# Patient Record
Sex: FEMALE | Race: WHITE | NOT HISPANIC OR LATINO | Employment: FULL TIME | URBAN - METROPOLITAN AREA
[De-identification: names, ages, dates, MRNs, and addresses within clinical notes are randomized per-mention and may not be internally consistent; named-entity substitution may affect disease eponyms.]

---

## 2017-01-09 ENCOUNTER — RADIANT APPOINTMENT (OUTPATIENT)
Dept: MAMMOGRAPHY | Facility: CLINIC | Age: 47
End: 2017-01-09

## 2017-01-09 DIAGNOSIS — Z12.31 VISIT FOR SCREENING MAMMOGRAM: ICD-10-CM

## 2018-01-24 ENCOUNTER — RADIANT APPOINTMENT (OUTPATIENT)
Dept: MAMMOGRAPHY | Facility: CLINIC | Age: 48
End: 2018-01-24
Attending: FAMILY MEDICINE
Payer: COMMERCIAL

## 2018-01-24 DIAGNOSIS — Z12.31 VISIT FOR SCREENING MAMMOGRAM: ICD-10-CM

## 2019-02-06 ENCOUNTER — ANCILLARY PROCEDURE (OUTPATIENT)
Dept: MAMMOGRAPHY | Facility: CLINIC | Age: 49
End: 2019-02-06
Payer: COMMERCIAL

## 2019-02-06 DIAGNOSIS — Z12.31 VISIT FOR SCREENING MAMMOGRAM: ICD-10-CM

## 2019-03-09 ENCOUNTER — HEALTH MAINTENANCE LETTER (OUTPATIENT)
Age: 49
End: 2019-03-09

## 2019-10-03 ENCOUNTER — HEALTH MAINTENANCE LETTER (OUTPATIENT)
Age: 49
End: 2019-10-03

## 2020-02-20 ENCOUNTER — ANCILLARY PROCEDURE (OUTPATIENT)
Dept: MAMMOGRAPHY | Facility: CLINIC | Age: 50
End: 2020-02-20
Attending: FAMILY MEDICINE
Payer: COMMERCIAL

## 2020-02-20 DIAGNOSIS — Z12.31 VISIT FOR SCREENING MAMMOGRAM: ICD-10-CM

## 2020-03-22 ENCOUNTER — HEALTH MAINTENANCE LETTER (OUTPATIENT)
Age: 50
End: 2020-03-22

## 2021-01-15 ENCOUNTER — HEALTH MAINTENANCE LETTER (OUTPATIENT)
Age: 51
End: 2021-01-15

## 2021-03-09 ENCOUNTER — ANCILLARY PROCEDURE (OUTPATIENT)
Dept: MAMMOGRAPHY | Facility: CLINIC | Age: 51
End: 2021-03-09
Payer: COMMERCIAL

## 2021-03-09 DIAGNOSIS — Z12.31 VISIT FOR SCREENING MAMMOGRAM: ICD-10-CM

## 2021-03-09 PROCEDURE — 77067 SCR MAMMO BI INCL CAD: CPT | Mod: GC | Performed by: STUDENT IN AN ORGANIZED HEALTH CARE EDUCATION/TRAINING PROGRAM

## 2021-03-09 PROCEDURE — 77063 BREAST TOMOSYNTHESIS BI: CPT | Mod: GC | Performed by: STUDENT IN AN ORGANIZED HEALTH CARE EDUCATION/TRAINING PROGRAM

## 2021-09-05 ENCOUNTER — HEALTH MAINTENANCE LETTER (OUTPATIENT)
Age: 51
End: 2021-09-05

## 2022-02-20 ENCOUNTER — HEALTH MAINTENANCE LETTER (OUTPATIENT)
Age: 52
End: 2022-02-20

## 2022-04-12 ENCOUNTER — ANCILLARY PROCEDURE (OUTPATIENT)
Dept: MAMMOGRAPHY | Facility: CLINIC | Age: 52
End: 2022-04-12
Attending: FAMILY MEDICINE
Payer: COMMERCIAL

## 2022-04-12 DIAGNOSIS — Z12.31 VISIT FOR SCREENING MAMMOGRAM: ICD-10-CM

## 2022-04-12 PROCEDURE — 77063 BREAST TOMOSYNTHESIS BI: CPT | Performed by: STUDENT IN AN ORGANIZED HEALTH CARE EDUCATION/TRAINING PROGRAM

## 2022-04-12 PROCEDURE — 77067 SCR MAMMO BI INCL CAD: CPT | Performed by: STUDENT IN AN ORGANIZED HEALTH CARE EDUCATION/TRAINING PROGRAM

## 2022-10-23 ENCOUNTER — HEALTH MAINTENANCE LETTER (OUTPATIENT)
Age: 52
End: 2022-10-23

## 2023-04-19 ENCOUNTER — ANCILLARY PROCEDURE (OUTPATIENT)
Dept: MAMMOGRAPHY | Facility: CLINIC | Age: 53
End: 2023-04-19
Attending: FAMILY MEDICINE
Payer: COMMERCIAL

## 2023-04-19 DIAGNOSIS — Z12.31 VISIT FOR SCREENING MAMMOGRAM: ICD-10-CM

## 2023-04-19 PROCEDURE — 77063 BREAST TOMOSYNTHESIS BI: CPT | Mod: GC | Performed by: RADIOLOGY

## 2023-04-19 PROCEDURE — 77067 SCR MAMMO BI INCL CAD: CPT | Mod: GC | Performed by: RADIOLOGY

## 2023-04-24 ENCOUNTER — TRANSFERRED RECORDS (OUTPATIENT)
Dept: HEALTH INFORMATION MANAGEMENT | Facility: CLINIC | Age: 53
End: 2023-04-24
Payer: COMMERCIAL

## 2023-04-26 ENCOUNTER — ANCILLARY PROCEDURE (OUTPATIENT)
Dept: MAMMOGRAPHY | Facility: CLINIC | Age: 53
End: 2023-04-26
Attending: FAMILY MEDICINE
Payer: COMMERCIAL

## 2023-04-26 DIAGNOSIS — R92.8 ABNORMAL MAMMOGRAM OF LEFT BREAST: ICD-10-CM

## 2023-04-26 PROCEDURE — 77065 DX MAMMO INCL CAD UNI: CPT | Mod: LT | Performed by: RADIOLOGY

## 2023-05-01 ENCOUNTER — TRANSFERRED RECORDS (OUTPATIENT)
Dept: HEALTH INFORMATION MANAGEMENT | Facility: CLINIC | Age: 53
End: 2023-05-01

## 2023-05-01 ENCOUNTER — ANCILLARY PROCEDURE (OUTPATIENT)
Dept: MAMMOGRAPHY | Facility: CLINIC | Age: 53
End: 2023-05-01
Attending: FAMILY MEDICINE
Payer: COMMERCIAL

## 2023-05-01 DIAGNOSIS — R92.8 ABNORMAL MAMMOGRAM OF LEFT BREAST: ICD-10-CM

## 2023-05-01 PROCEDURE — 88360 TUMOR IMMUNOHISTOCHEM/MANUAL: CPT | Mod: 26 | Performed by: PATHOLOGY

## 2023-05-01 PROCEDURE — 88341 IMHCHEM/IMCYTCHM EA ADD ANTB: CPT | Mod: 26 | Performed by: PATHOLOGY

## 2023-05-01 PROCEDURE — 88342 IMHCHEM/IMCYTCHM 1ST ANTB: CPT | Mod: 26 | Performed by: PATHOLOGY

## 2023-05-01 PROCEDURE — 19081 BX BREAST 1ST LESION STRTCTC: CPT | Mod: LT | Performed by: STUDENT IN AN ORGANIZED HEALTH CARE EDUCATION/TRAINING PROGRAM

## 2023-05-01 PROCEDURE — 88305 TISSUE EXAM BY PATHOLOGIST: CPT | Mod: 26 | Performed by: PATHOLOGY

## 2023-05-01 PROCEDURE — 88305 TISSUE EXAM BY PATHOLOGIST: CPT | Mod: TC | Performed by: FAMILY MEDICINE

## 2023-05-01 RX ORDER — LIDOCAINE HYDROCHLORIDE AND EPINEPHRINE 10; 10 MG/ML; UG/ML
10 INJECTION, SOLUTION INFILTRATION; PERINEURAL ONCE
Status: COMPLETED | OUTPATIENT
Start: 2023-05-01 | End: 2023-05-01

## 2023-05-01 RX ADMIN — LIDOCAINE HYDROCHLORIDE AND EPINEPHRINE 10 ML: 10; 10 INJECTION, SOLUTION INFILTRATION; PERINEURAL at 14:20

## 2023-05-03 ENCOUNTER — TELEPHONE (OUTPATIENT)
Dept: MAMMOGRAPHY | Facility: CLINIC | Age: 53
End: 2023-05-03
Payer: COMMERCIAL

## 2023-05-03 ENCOUNTER — PATIENT OUTREACH (OUTPATIENT)
Dept: ONCOLOGY | Facility: CLINIC | Age: 53
End: 2023-05-03
Payer: COMMERCIAL

## 2023-05-03 DIAGNOSIS — D05.12 DUCTAL CARCINOMA IN SITU (DCIS) OF LEFT BREAST: Primary | ICD-10-CM

## 2023-05-03 LAB
PATH REPORT.COMMENTS IMP SPEC: ABNORMAL
PATH REPORT.COMMENTS IMP SPEC: ABNORMAL
PATH REPORT.COMMENTS IMP SPEC: YES
PATH REPORT.FINAL DX SPEC: ABNORMAL
PATH REPORT.GROSS SPEC: ABNORMAL
PATH REPORT.MICROSCOPIC SPEC OTHER STN: ABNORMAL
PATH REPORT.MICROSCOPIC SPEC OTHER STN: ABNORMAL
PATH REPORT.RELEVANT HX SPEC: ABNORMAL
PATHOLOGY SYNOPTIC REPORT: ABNORMAL
PHOTO IMAGE: ABNORMAL

## 2023-05-03 NOTE — TELEPHONE ENCOUNTER
Left a message for Luna regarding availability of her breast biopsy results.  Awaiting return phone call.  Call back number left was [phone].

## 2023-05-03 NOTE — TELEPHONE ENCOUNTER
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  We discussed the Radiologist's recommendation of surgical consultation.  A referral has been placed and someone will be reaching out to help coordinate the appointment.  Luna verbalized understanding and all questions and concerns were answered at this time.

## 2023-05-03 NOTE — PROGRESS NOTES
New Patient Oncology Nurse Navigator Note     Referred to (specialty:) Medical Oncology and Cancer Surgery      Date Referral Received: May 3, 2023     Evaluation for:  D05.12 (ICD-10-CM) - Ductal carcinoma in situ (DCIS) of left breast     Clinical History (per Nurse review of records provided):      Luna had bilateral screening mammogram on 4/19/23 and possible calcifications were identified in the left breast at the 1 o'clock position, posterior depth. Diagnostic imaging followed on 4/26 and spot magnification views demonstrates punctate and fine pleomorphic calcifications in the left breast 1:00 position, middle depth, measuring up to 5 mm.    5/1/23 -   LEFT BREAST, 1:00, MID-DEPTH, CALCIFICATIONS, STEREOTACTIC-GUIDED CORE BIOPSY:  -DUCTAL CARCINOMA IN SITU (DCIS), nuclear grade 3, solid and cribriform types with comedonecrosis and calcifications.  -Calcifications associated with DCIS and benign epithelium.  -DCIS is ER positive (strong, >95%) and AK positive (moderate, 70%).  -No evidence of invasion.     Records Location: See Bookmarked material     Records Needed: Breast imaging for past 5 years     5/4 8:08am  - Telephoned and spoke with Luna to assist in scheduling consults. Writer received referral, reviewed for appropriate plan, and call transferred to New Patient Scheduling for completion.

## 2023-05-04 ENCOUNTER — OFFICE VISIT (OUTPATIENT)
Dept: ONCOLOGY | Facility: CLINIC | Age: 53
End: 2023-05-04
Attending: SURGERY
Payer: COMMERCIAL

## 2023-05-04 ENCOUNTER — PRE VISIT (OUTPATIENT)
Dept: ONCOLOGY | Facility: CLINIC | Age: 53
End: 2023-05-04
Payer: COMMERCIAL

## 2023-05-04 ENCOUNTER — PATIENT OUTREACH (OUTPATIENT)
Dept: ONCOLOGY | Facility: CLINIC | Age: 53
End: 2023-05-04

## 2023-05-04 VITALS
HEART RATE: 84 BPM | BODY MASS INDEX: 24.77 KG/M2 | DIASTOLIC BLOOD PRESSURE: 88 MMHG | TEMPERATURE: 98.9 F | RESPIRATION RATE: 16 BRPM | SYSTOLIC BLOOD PRESSURE: 131 MMHG | HEIGHT: 66 IN | OXYGEN SATURATION: 99 % | WEIGHT: 154.1 LBS

## 2023-05-04 DIAGNOSIS — D05.12 DUCTAL CARCINOMA IN SITU (DCIS) OF LEFT BREAST: Primary | ICD-10-CM

## 2023-05-04 DIAGNOSIS — D05.12 DUCTAL CARCINOMA IN SITU (DCIS) OF LEFT BREAST: ICD-10-CM

## 2023-05-04 LAB
INTERPRETATION: NORMAL
INTERPRETATION: NORMAL
LAB PDF RESULT: NORMAL
LAB PDF RESULT: NORMAL
SIGNIFICANT RESULTS: NORMAL
SIGNIFICANT RESULTS: NORMAL
SPECIMEN DESCRIPTION: NORMAL
SPECIMEN DESCRIPTION: NORMAL
TEST DETAILS, MDL: NORMAL
TEST DETAILS, MDL: NORMAL

## 2023-05-04 PROCEDURE — 36415 COLL VENOUS BLD VENIPUNCTURE: CPT | Performed by: SURGERY

## 2023-05-04 PROCEDURE — 99417 PROLNG OP E/M EACH 15 MIN: CPT | Performed by: SURGERY

## 2023-05-04 PROCEDURE — 99205 OFFICE O/P NEW HI 60 MIN: CPT | Performed by: SURGERY

## 2023-05-04 PROCEDURE — 99212 OFFICE O/P EST SF 10 MIN: CPT | Performed by: SURGERY

## 2023-05-04 ASSESSMENT — PAIN SCALES - GENERAL: PAINLEVEL: NO PAIN (0)

## 2023-05-04 NOTE — LETTER
5/4/2023         RE: Luna Eugene  [address]  [place] [address]        Dear Colleague,    Thank you for referring your patient, Luna Eugene, to the [facility]. Please see a copy of my visit note below.    NEW SURGICAL CONSULTATION  May 4, 2023    Luna Eugene is a 52 year old woman who presents with a left  breast complaint.  She was referred by Ale Smith DO.    HPI:    She notes no masses in either breast, axilla, or neck. She denies any nipple discharge or nipple inversion.     Imaging showed 5 mm of microcalcifications at 1:00 middle depth.    A biopsy was performed and a clip was placed.  It showed ductal ca      BREAST-SPECIFIC HISTORY:  Prior breast surgeries: Yes Right axillary node excision 1997  Prior radiation history: No  Bra size: 34 B  Dominant hand: Right    FAMILY HISTORY:  Breast ca: No  Ovarian ca: No  Pancreatic ca: No  Melanoma: No  Gastric ca: No  Colon ca: No  Other cancer: Yes - mother w/ CML (dx 67)    Patient Active Problem List   Diagnosis    Irritable bowel syndrome    Fibromyalgia    Classical migraine    Panic disorder without agoraphobia    Insomnia    Allergic Rhinitis    CARDIOVASCULAR SCREENING; LDL GOAL LESS THAN 160    Dyspepsia    Presence of intrauterine contraceptive device    Herpes simplex labialis    No HTN, MI, CVA, DM    Past Medical History:   Diagnosis Date    Allergic rhinitis     Classic migraines     Fibromyalgia     GERD (gastroesophageal reflux disease)     Insomnia     Irritable bowel syndrome     Kidney stones     Panic disorder without agoraphobia     Presence of intrauterine contraceptive device 11/1/07    Mirena.  Replaced 11/1/12.       Past Surgical History:   Procedure Laterality Date    COLONOSCOPY  8/16/06    WNL     ESOPHAGOSCOPY, DIAGNOSTIC  5/02    decreased tone at GE junction (Dr. Billings)    [facility] NONSPECIFIC PROCEDURE      [place] teeth removed    [facility] NONSPECIFIC PROCEDURE  1997    Lymph  "node removal right axilla    [facility] NONSPECIFIC PROCEDURE  1991    Colposcopy    [facility] UGI ENDOSCOPY, SIMPLE EXAM  4/28/06    WNL   No GA issues    Current Outpatient Medications   Medication Sig Dispense Refill    cetirizine (ZYRTEC) 10 MG tablet Take 1 tablet by mouth daily as needed for allergies.      Cholecalciferol (VITAMIN D) 400 UNITS capsule Take 1 capsule by mouth daily.      isometheptene-acetaminophen-dichloralphenazone (MIDRIN) -100 MG per capsule 2 capsules at headache onset followed by 1 capsule every hour as needed up to 5 capsules/day 30 capsule 0    MULTI-VITAMIN OR TABS Take 1 tablet by mouth daily  0    lorazepam (ATIVAN) 0.5 MG tablet Take 1 tablet by mouth 2 times daily as needed for anxiety. 60 tablet 0    valACYclovir (VALTREX) 1000 mg tablet Take 2 tablets (2,000 mg) by mouth 2 times daily 4 tablet 11           Allergies   Allergen Reactions    No Known Allergies         SOCIAL HISTORY:  Smokes: No    She works as an  - grade 3    ROS:  Easy bruising/bleeding: No  History of DVT/PE: No personal history; MGF did have VTE.    /88 (BP Location: Left arm, Patient Position: Sitting, Cuff Size: Adult Regular)   Pulse 84   Temp 98.9  F (37.2  C) (Oral)   Resp 16   Ht 1.683 m (5' 6.25\")   Wt 69.9 kg (154 lb 1.6 oz)   SpO2 99%   BMI 24.69 kg/m     Physical Exam  Constitutional:       Appearance: She is well-developed.   Chest:   Breasts:     Breasts are symmetrical.      Right: No inverted nipple, mass, nipple discharge, skin change or tenderness.      Left: No inverted nipple, mass, nipple discharge, skin change or tenderness.          Comments: Patient was examined in both supine and upright positions.   Lymphadenopathy:      Cervical: No cervical adenopathy.      Right cervical: No superficial, deep or posterior cervical adenopathy.     Left cervical: No superficial, deep or posterior cervical adenopathy.      Upper Body:      Right upper body: No " supraclavicular, axillary or pectoral adenopathy.      Left upper body: No supraclavicular, axillary or pectoral adenopathy.      Comments: No lymphedema in bilateral upper extremities.   Skin:     General: Skin is warm and dry.          INVESTIGATIONS:    Diagnostic Mammogram (4/26/2023) showed:  Technique:  Tomosynthesis  Findings: Spot magnification views demonstrates punctate and fine pleomorphic calcifications in the left breast 1:00 position, middle depth, measuring up to 5 mm.  IMPRESSION: BI-RADS CATEGORY: 4 - Suspicious.    Screening Mammogram (4/19/2023) showed:  Findings: The breasts are heterogeneously dense, which may obscure small masses.  There are possible calcifications in the left breast at the 1 o'clock position, posterior depth.  The remainder of the breast tissue is unremarkable.  There is no suspicious finding of the right breast.  IMPRESSION: BI-RADS CATEGORY: 0 - Incomplete - Need Additional Imaging    Biopsy (5/1/2023) showed:  Final Diagnosis   LEFT BREAST, 1:00, MID-DEPTH, CALCIFICATIONS, STEREOTACTIC-GUIDED CORE BIOPSY:  -DUCTAL CARCINOMA IN SITU (DCIS), nuclear grade 3, solid and cribriform types with comedonecrosis and calcifications.  -Calcifications associated with DCIS and benign epithelium.  -DCIS is ER positive (strong, >95%) and MD positive (moderate, 70%).  -No evidence of invasion.       ASSESSMENT:  Luna Eugene is a 52 year old woman with ductal carcinoma in situ of the LEFT breast.    I personally reviewed the imaging above.  I recommended contrast enhanced mammography to complete her breast evaluation.    Her stage is 0 ([id number]).    The imaging, diagnosis and treatment of ductal carcinoma in situ (DCIS) was discussed with Luna Eugene and her .  The mainstay of treatment for DCIS is surgical resection, in the form of either breast conservation (segmental mastectomy plus radiation) or mastectomy.  We reviewed that the two strategies are equivalent in terms of  "overall survival.  They are both same day surgeries. The advantages and disadvantages of each were discussed.   Luna Eugene IS a candidate for breast conservation therapy assuming no additional abnormalities are found on contrast enhanced mammogram.  We discussed that this involves two necessary components: the lumpectomy (or \"segmental mastectomy\"), and several weeks of whole breast radiation therapy.  We discussed that the overall survival after breast conservation therapy is identical to mastectomy and that local recurrence (either DCIS or invasive cancer) rates are significantly higher if segmental mastectomy was performed without subsequent radiation.  We also discussed the significance of clear margins and that a subsequent procedure may be necessary to achieve this.  Finally, we reviewed that a small subset of patients who undergo surgery for DCIS will be found to have invasive cancer in the final surgical pathology.  Should this be the case, further surgery +/- adjuvant therapy would be recommended.     Because the lesion is not palpable, a radiofrequency identification (RFID) seed-localized approach will be taken.  She would present prior to surgery for an image-guided RFID seed placement, followed by a surgical excision in the operating room.  The risks of a RFID seed-localized segmental mastectomy were discussed with the patient and family, including the risks of bleeding, wound infection, wound dehiscence, and post-operative contour change to the breast.  There is also the risk of needing a repeat surgery (I.e. re-excisional segmental mastectomy) for involved margin(s).  We discussed obtaining a supportive bra for postoperative recovery and that prescriptions for narcotics are not typically provided for this procedure.    We discussed the various types of mastectomy, including simple and skin-sparing mastectomy.  The risks of a mastectomy were discussed with the patient and family, including the " risks of bleeding, wound infection, wound dehiscence, skin flap/nipple necrosis, poor cosmetic outcomes with skin folds, decreased shoulder range of motion, chest wall numbness, and seroma formation.  A drain would be placed at the time of surgery. The option of having immediate versus delayed reconstruction was also discussed.   We reviewed that the advantages of immediate reconstruction includes superior cosmetics, as the skin is preserved.  However, the major disadvantage is increased postoperative risks, including skin flap ischemia and expander infection, which can potentially delay adjuvant oncologic treatments which may be needed post-surgically.  Luna Eugene was interested in this; a Plastic Surgery consultation was offered and will be arranged. Depending on the margin and emanuel status post-mastectomy, radiation may be necessary.    Finally, we reviewed that a small subset of patients who undergo surgery for DCIS will be found to have invasive cancer in the final surgical pathology.  Should this be the case, a sentinel lymph node biopsy is recommended for emanuel staging of the axilla.  This surgical procedure is indicated at the time of mastectomy for DCIS but not for segmental mastectomy.  In addition to the surgical management of the breast, a sentinel lymph node biopsy is recommended for emanuel staging of the axilla.  This is performed with the combination of the radioactive Tilmanocept and dye (lymphazurin or indocyanine green). The risks of a sentinel lymph node biopsy were discussed with the patient and family, including the risks of lymphedema (5-10%), bleeding, wound infection, wound dehiscence, seroma formation, and paresthesias. There is an approximately 10% false negative rate associated with sentinel lymph node biopsy as published in the literature.  The findings of the sentinel lymph node biopsy may result in the need for treatment.     We discussed that surgical pathology results will be  reviewed at the postoperative visit to allow for careful discussion of next steps and for answering questions.    We reviewed that as part of team-based approach to breast cancer, medical oncology and radiation oncology referrals will also be made to discuss adjuvant systemic/endocrine therapy and radiation therapy.  She will be meeting with Dr Jones of Medical Oncology next week.    We reviewed the genetic testing guidelines by the American Society of Breast Surgeons from February 2019.  I have recommended genetic testing and counseling.  The natural history of pathogenic variants in genes like BRCA and breast cancer were discussed with the patient. Should a pathogenic variant be identified, she may  no longer be a good candidate for breast conservation.  We also reviewed the risk reduction benefits of a contralateral mastectomy in this situation. Because genetic testing results will influence surgical management of this breast cancer, I have recommended germline genetic testing on an urgent basis with the Breast Actionable panel.  We discussed that results can take 2-3 weeks to result. We reviewed that genetic counseling will be arranged.  Please see below for additional details.     She takes oral hormonal contraception; we discussed that all forms of hormonal contraception are contraindicated given an ER+ DCIS diagnosis.    All questions were answered.  She did seem to understand the above.   Luna Eugene elected to proceed with contrast mammogram and genetic testing. She will consider her surgical options and contact my office with a decision.    Total time spent with the patient was 70 minutes, of which 75% was counseling.     PLAN:  Bilateral contrast enhanced mammogram  Germline genetic testing with the Breast Actionable panel - I will review results with patient via [employer].   Genetic counseling referral  LEFT RFID seed-localized segmental mastectomy vs BILATERAL skin-sparing mastectomy  LEFT axillary  sentinel lymph node mapping and biopsy if proceeding with mastectomy  Plastic surgery referral  Patient to contact my office when she has decided on how to proceed  Stop oral hormonal contraception    Bernadine Coon MD MS [facility] FACS  Associate Professor of Surgery  Division of Surgical Oncology  [facility]     90 minutes spent on the date of the encounter doing chart review, review of test results, interpretation of tests, patient visit, documentation and discussion with family.    ---    GENETIC TESTING FOR BRCA1/BRCA2:    Based on her personal history, Luna meets the current National Comprehensive Cancer Network (NCCN) criteria for genetic testing of BRCA1/BRCA2 and/or requires genetic testing based on the recommendation of the American Society of Breast Surgeons Consensus Guideline on Genetic Testing for Hereditary Breast Cancer.    As hereditary breast cancer is suspected, there is a reasonable probability of detecting a mutation in my patient.    Medical Management:  For Luna, we reviewed that the information from genetic testing may determine:  surgery to treat Luna's active cancer diagnosis  targeted chemotherapies for Luna's active cancer, or   additional cancer screening and/or medical intervention for which Luna may qualify    Additionally, family history was reviewed for any significant cancer history in Luna's first and second degree relatives. Family history is significant for the following: see above    I, a qualified medical provider, counseled the patient on possible test results and its implications in relation to Luna's treatment/management. Luna expressed understanding and consented to proceed with genetic testing. Additionally, a referral to genetic counseling was placed for further discussion.       Bernadine Coon MD

## 2023-05-04 NOTE — PROGRESS NOTES
[facility]: Surgical Oncology Plan of Care Education Note                                     Discussion with Patient:                                                         Met with Luna and her  following her visit with Dr. Coon on 5/4/2023. Introduced self and explained role of RNCC.       Plan:                                                       Reviewed plan for Left tag localized lumpectomy at the [facility]. Discussed need for H&P within 30 days of surgery. Luna will schedule with her PCP. Reviewed shower instructions and provided written hand-out and bottle of surgical scrub.     Luna is currently deciding between a lumpectomy and bilateral mastectomy. She is currently awaiting genetics results. She is requesting a Plastics Consult in the event she decides to have a mastectomy.     The process for scheduling the tag placement was reviewed with Luna. She is aware [facility] will be calling to schedule the tag placement at least two days prior to her surgery.     Drain instructions were reviewed and provided to patient in the event she decides on bilateral mastectomy.       Breast Action Panel consent was completed and labs were drawn. Reviewed expected timing of results and that Dr. Coon would reach out to review them and discuss any change in plan. A Genetics Consult referral was placed for Luna. She is aware this will be scheduled in several weeks, unless a consult is needed for insurance approval for her genetics testing.     A message was sent to scheduling team to schedule mammogram.     Informed patient they should get a call within the next three business days from our OR  with surgery date, H&P date and date of post-op visit with their surgeon. Writer answered all questions and concerns to the best of her ability and provided her contact information. Reviewed use of [employer] as alternative way to contact team.  Encouraged patient to  contact with questions.         Jocelyn Hanson RNCC  [facility]  Surgical Onolcogy      Approximately 20 minutes was spent in discussion with patient.

## 2023-05-04 NOTE — NURSING NOTE
Labs drawn in clinic by LPN via venipuncture in right arm with 21 G.    Patient tolerated well and had no issues.    Ignacio Licona LPN

## 2023-05-04 NOTE — NURSING NOTE
"Oncology Rooming Note    May 4, 2023 2:05 PM   Luna Eugene is a 52 year old female who presents for:    Chief Complaint   Patient presents with     Oncology Clinic Visit     Ductal Carcinoma In Situ of Left Breast     Initial Vitals: /88 (BP Location: Left arm, Patient Position: Sitting, Cuff Size: Adult Regular)   Pulse 84   Temp 98.9  F (37.2  C) (Oral)   Resp 16   Ht 1.683 m (5' 6.25\")   Wt 69.9 kg (154 lb 1.6 oz)   SpO2 99%   BMI 24.69 kg/m   Estimated body mass index is 24.69 kg/m  as calculated from the following:    Height as of this encounter: 1.683 m (5' 6.25\").    Weight as of this encounter: 69.9 kg (154 lb 1.6 oz). Body surface area is 1.81 meters squared.  No Pain (0) Comment: Data Unavailable   No LMP recorded. (Menstrual status: IUD).  Allergies reviewed: Yes  Medications reviewed: Yes    Medications: Medication refills not needed today.  Pharmacy name entered into EPIC:    [id number] - [place], [place] - [address]  [id number] - [place], [place] - [address] AT [address] & [id number] - [place], [place] - [address]    Clinical concerns: Pt is a new consult with Dr Coon.       Radha Le, CLIFF  5/4/2023              "

## 2023-05-04 NOTE — PROGRESS NOTES
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  LVM for PT to see if there are any additional records or imaging needed and sent an IB to [facility] as there is no pertinent info in chart.  11:36 AM CB from PT confirming no outside records

## 2023-05-04 NOTE — PROGRESS NOTES
NEW SURGICAL CONSULTATION  May 4, 2023    Luna Eugene is a 52 year old woman who presents with a left  breast complaint.  She was referred by Ale Smith DO.    HPI:    She notes no masses in either breast, axilla, or neck. She denies any nipple discharge or nipple inversion.     Imaging showed 5 mm of microcalcifications at 1:00 middle depth inf the LEFT breast.    A biopsy was performed and a clip was placed.  It showed ductal carcinoma in situ, grade 3, ER+ GA+.    BREAST-SPECIFIC HISTORY:  Prior breast surgeries: Yes Right axillary node excision 1997  Prior radiation history: No  Bra size: 34 B  Dominant hand: Right    FAMILY HISTORY:  Breast ca: No  Ovarian ca: No  Pancreatic ca: No  Melanoma: No  Gastric ca: No  Colon ca: No  Other cancer: Yes - mother w/ CML (dx 67)    Patient Active Problem List   Diagnosis     Irritable bowel syndrome     Fibromyalgia     Classical migraine     Panic disorder without agoraphobia     Insomnia     Allergic Rhinitis     CARDIOVASCULAR SCREENING; LDL GOAL LESS THAN 160     Dyspepsia     Presence of intrauterine contraceptive device     Herpes simplex labialis    No HTN, MI, CVA, DM    Past Medical History:   Diagnosis Date     Allergic rhinitis      Classic migraines      Fibromyalgia      GERD (gastroesophageal reflux disease)      Insomnia      Irritable bowel syndrome      Kidney stones      Panic disorder without agoraphobia      Presence of intrauterine contraceptive device 11/1/07    Mirena.  Replaced 11/1/12.       Past Surgical History:   Procedure Laterality Date     COLONOSCOPY  8/16/06    WNL      ESOPHAGOSCOPY, DIAGNOSTIC  5/02    decreased tone at GE junction (Dr. Billings)     [facility] NONSPECIFIC PROCEDURE      Wisdon teeth removed     [facility] NONSPECIFIC PROCEDURE  1997    Lymph node removal right axilla     [facility] NONSPECIFIC PROCEDURE  1991    Colposcopy     [facility] UGI ENDOSCOPY, SIMPLE EXAM  4/28/06    WNL   No GA issues    Current Outpatient Medications  "  Medication Sig Dispense Refill     cetirizine (ZYRTEC) 10 MG tablet Take 1 tablet by mouth daily as needed for allergies.       Cholecalciferol (VITAMIN D) 400 UNITS capsule Take 1 capsule by mouth daily.       isometheptene-acetaminophen-dichloralphenazone (MIDRIN) -100 MG per capsule 2 capsules at headache onset followed by 1 capsule every hour as needed up to 5 capsules/day 30 capsule 0     MULTI-VITAMIN OR TABS Take 1 tablet by mouth daily  0     lorazepam (ATIVAN) 0.5 MG tablet Take 1 tablet by mouth 2 times daily as needed for anxiety. 60 tablet 0     valACYclovir (VALTREX) 1000 mg tablet Take 2 tablets (2,000 mg) by mouth 2 times daily 4 tablet 11           Allergies   Allergen Reactions     No Known Allergies         SOCIAL HISTORY:  Smokes: No    She works as an  - grade 3    ROS:  Easy bruising/bleeding: No  History of DVT/PE: No personal history; MGF did have VTE.    /88 (BP Location: Left arm, Patient Position: Sitting, Cuff Size: Adult Regular)   Pulse 84   Temp 98.9  F (37.2  C) (Oral)   Resp 16   Ht 1.683 m (5' 6.25\")   Wt 69.9 kg (154 lb 1.6 oz)   SpO2 99%   BMI 24.69 kg/m     Physical Exam  Constitutional:       Appearance: She is well-developed.   Chest:   Breasts:     Breasts are symmetrical.      Right: No inverted nipple, mass, nipple discharge, skin change or tenderness.      Left: No inverted nipple, mass, nipple discharge, skin change or tenderness.          Comments: Patient was examined in both supine and upright positions.   Lymphadenopathy:      Cervical: No cervical adenopathy.      Right cervical: No superficial, deep or posterior cervical adenopathy.     Left cervical: No superficial, deep or posterior cervical adenopathy.      Upper Body:      Right upper body: No supraclavicular, axillary or pectoral adenopathy.      Left upper body: No supraclavicular, axillary or pectoral adenopathy.      Comments: No lymphedema in bilateral upper " extremities.   Skin:     General: Skin is warm and dry.          INVESTIGATIONS:    Diagnostic Mammogram (4/26/2023) showed:  Technique:  Tomosynthesis  Findings: Spot magnification views demonstrates punctate and fine pleomorphic calcifications in the left breast 1:00 position, middle depth, measuring up to 5 mm.  IMPRESSION: BI-RADS CATEGORY: 4 - Suspicious.    Screening Mammogram (4/19/2023) showed:  Findings: The breasts are heterogeneously dense, which may obscure small masses.  There are possible calcifications in the left breast at the 1 o'clock position, posterior depth.  The remainder of the breast tissue is unremarkable.  There is no suspicious finding of the right breast.  IMPRESSION: BI-RADS CATEGORY: 0 - Incomplete - Need Additional Imaging    Biopsy (5/1/2023) showed:  Final Diagnosis   LEFT BREAST, 1:00, MID-DEPTH, CALCIFICATIONS, STEREOTACTIC-GUIDED CORE BIOPSY:  -DUCTAL CARCINOMA IN SITU (DCIS), nuclear grade 3, solid and cribriform types with comedonecrosis and calcifications.  -Calcifications associated with DCIS and benign epithelium.  -DCIS is ER positive (strong, >95%) and RI positive (moderate, 70%).  -No evidence of invasion.       ASSESSMENT:  Luna Eugene is a 52 year old woman with ductal carcinoma in situ of the LEFT breast.    I personally reviewed the imaging above.  I recommended contrast enhanced mammography to complete her breast evaluation.    Her stage is 0 ([id number]).    The imaging, diagnosis and treatment of ductal carcinoma in situ (DCIS) was discussed with Luna Eugene and her .  The mainstay of treatment for DCIS is surgical resection, in the form of either breast conservation (segmental mastectomy plus radiation) or mastectomy.  We reviewed that the two strategies are equivalent in terms of overall survival.  They are both same day surgeries. The advantages and disadvantages of each were discussed.   Luna Eugene IS a candidate for breast conservation  "therapy assuming no additional abnormalities are found on contrast enhanced mammogram.  We discussed that this involves two necessary components: the lumpectomy (or \"segmental mastectomy\"), and several weeks of whole breast radiation therapy.  We discussed that the overall survival after breast conservation therapy is identical to mastectomy and that local recurrence (either DCIS or invasive cancer) rates are significantly higher if segmental mastectomy was performed without subsequent radiation.  We also discussed the significance of clear margins and that a subsequent procedure may be necessary to achieve this.  Finally, we reviewed that a small subset of patients who undergo surgery for DCIS will be found to have invasive cancer in the final surgical pathology.  Should this be the case, further surgery +/- adjuvant therapy would be recommended.     Because the lesion is not palpable, a radiofrequency identification (RFID) seed-localized approach will be taken.  She would present prior to surgery for an image-guided RFID seed placement, followed by a surgical excision in the operating room.  The risks of a RFID seed-localized segmental mastectomy were discussed with the patient and family, including the risks of bleeding, wound infection, wound dehiscence, and post-operative contour change to the breast.  There is also the risk of needing a repeat surgery (I.e. re-excisional segmental mastectomy) for involved margin(s).  We discussed obtaining a supportive bra for postoperative recovery and that prescriptions for narcotics are not typically provided for this procedure.    We discussed the various types of mastectomy, including simple and skin-sparing mastectomy.  The risks of a mastectomy were discussed with the patient and family, including the risks of bleeding, wound infection, wound dehiscence, skin flap/nipple necrosis, poor cosmetic outcomes with skin folds, decreased shoulder range of motion, chest wall " numbness, and seroma formation.  A drain would be placed at the time of surgery. The option of having immediate versus delayed reconstruction was also discussed.   We reviewed that the advantages of immediate reconstruction includes superior cosmetics, as the skin is preserved.  However, the major disadvantage is increased postoperative risks, including skin flap ischemia and expander infection, which can potentially delay adjuvant oncologic treatments which may be needed post-surgically.  Luna Eugene was interested in this; a Plastic Surgery consultation was offered and will be arranged. Depending on the margin and emanuel status post-mastectomy, radiation may be necessary.    Finally, we reviewed that a small subset of patients who undergo surgery for DCIS will be found to have invasive cancer in the final surgical pathology.  Should this be the case, a sentinel lymph node biopsy is recommended for emanuel staging of the axilla.  This surgical procedure is indicated at the time of mastectomy for DCIS but not for segmental mastectomy.  In addition to the surgical management of the breast, a sentinel lymph node biopsy is recommended for emanuel staging of the axilla.  This is performed with the combination of the radioactive Tilmanocept and dye (lymphazurin or indocyanine green). The risks of a sentinel lymph node biopsy were discussed with the patient and family, including the risks of lymphedema (5-10%), bleeding, wound infection, wound dehiscence, seroma formation, and paresthesias. There is an approximately 10% false negative rate associated with sentinel lymph node biopsy as published in the literature.  The findings of the sentinel lymph node biopsy may result in the need for treatment.     We discussed that surgical pathology results will be reviewed at the postoperative visit to allow for careful discussion of next steps and for answering questions.    We reviewed that as part of team-based approach to breast  cancer, medical oncology and radiation oncology referrals will also be made to discuss adjuvant systemic/endocrine therapy and radiation therapy.  She will be meeting with Dr Jones of Medical Oncology next week.    We reviewed the genetic testing guidelines by the American Society of Breast Surgeons from February 2019.  I have recommended genetic testing and counseling.  The natural history of pathogenic variants in genes like BRCA and breast cancer were discussed with the patient. Should a pathogenic variant be identified, she may  no longer be a good candidate for breast conservation.  We also reviewed the risk reduction benefits of a contralateral mastectomy in this situation. Because genetic testing results will influence surgical management of this breast cancer, I have recommended germline genetic testing on an urgent basis with the Breast Actionable panel.  We discussed that results can take 2-3 weeks to result. We reviewed that genetic counseling will be arranged.  Please see below for additional details.     She takes oral hormonal contraception; we discussed that all forms of hormonal contraception are contraindicated given an ER+ DCIS diagnosis.    All questions were answered.  She did seem to understand the above.   Luna Eugene elected to proceed with contrast mammogram and genetic testing. She will consider her surgical options and contact my office with a decision.    Total time spent with the patient was 70 minutes, of which 75% was counseling.     PLAN:  1. Bilateral contrast enhanced mammogram  2. Germline genetic testing with the Breast Actionable panel - I will review results with patient via [employer].   3. Genetic counseling referral  4. LEFT RFID seed-localized segmental mastectomy vs BILATERAL skin-sparing mastectomy  5. LEFT axillary sentinel lymph node mapping and biopsy if proceeding with mastectomy  6. Plastic surgery referral  7. Patient to contact my office when she has decided on  how to proceed  8. Stop oral hormonal contraception    Bernadine Coon MD MS [facility] FACS  Associate Professor of Surgery  Division of Surgical Oncology  [facility]     90 minutes spent on the date of the encounter doing chart review, review of test results, interpretation of tests, patient visit, documentation and discussion with family.    ---    GENETIC TESTING FOR BRCA1/BRCA2:    Based on her personal history, Luna meets the current National Comprehensive Cancer Network (NCCN) criteria for genetic testing of BRCA1/BRCA2 and/or requires genetic testing based on the recommendation of the American Society of Breast Surgeons Consensus Guideline on Genetic Testing for Hereditary Breast Cancer.    As hereditary breast cancer is suspected, there is a reasonable probability of detecting a mutation in my patient.    Medical Management:  For Luna, we reviewed that the information from genetic testing may determine:    surgery to treat Luna's active cancer diagnosis    targeted chemotherapies for Luna's active cancer, or     additional cancer screening and/or medical intervention for which Luna may qualify    Additionally, family history was reviewed for any significant cancer history in Luna's first and second degree relatives. Family history is significant for the following: see above    I, a qualified medical provider, counseled the patient on possible test results and its implications in relation to Luna's treatment/management. Luna expressed understanding and consented to proceed with genetic testing. Additionally, a referral to genetic counseling was placed for further discussion.

## 2023-05-05 ENCOUNTER — PATIENT OUTREACH (OUTPATIENT)
Dept: ONCOLOGY | Facility: CLINIC | Age: 53
End: 2023-05-05
Payer: COMMERCIAL

## 2023-05-05 ENCOUNTER — PATIENT OUTREACH (OUTPATIENT)
Dept: PLASTIC SURGERY | Facility: CLINIC | Age: 53
End: 2023-05-05
Payer: COMMERCIAL

## 2023-05-05 NOTE — PROGRESS NOTES
[facility]  Hematology/Oncology Consultation    Luna Eugene MRN# [id number]   Age: 52 year old YOB: 1970       CC: Breast Cancer      HPI: Luna Eugene is a 52 year old perimenopausal woman with recent diagnosis of 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  She presents today for initial medical oncology consultation.      Her full oncologic history is as follows:   Oncologic History  Patient Active Problem List    Diagnosis Date Noted     Malignant neoplasm of left breast in female, estrogen receptor positive, unspecified site of breast (H) 05/08/2023     Priority: High     Left breast DCIS  4/12/2022 last screening mammogram normal  4/19/2023 screening mammogram possible calcifications in the left breast at 1:00, posterior depth.  Left breast diagnostic mammo showed punctate and fine pleomorphic calcifications in the left breast at 1:00, middle depth measuring up to 5 mm.   5/1/2023 stereotactic guided core biopsy showing grade 3 DCIS with comedonecrosis and calcifications.  ER(3+,> 95%), PA(2+, 70%)         Herpes simplex labialis 09/23/2013     Priority: Medium     Allergic Rhinitis      Priority: Medium     Irritable bowel syndrome      Priority: Medium     Fibromyalgia      Priority: Medium     Classical migraine      Priority: Medium     Panic disorder without agoraphobia      Priority: Medium     Insomnia      Priority: Medium          Interval History  She is planned to have a contrast-enhanced mammogram tomorrow.  Genetic testing was done last week and is currently pending.      She is still processing her new diagnosis, and reports slightly increased anxiety and insomnia. She has otherwise been feeling well without any headaches, visual changes, cough, SOB, chest pain, n/v, constipation/diarrhea, abdominal pain, focal weakness or numbness.       Past Medical History  Past Medical History:   Diagnosis Date     Allergic rhinitis      Anxiety      Classic  migraines      Fibromyalgia      GERD (gastroesophageal reflux disease)      Insomnia      Irritable bowel syndrome      Kidney stones      Panic disorder without agoraphobia          Past Surgical History  Past Surgical History:   Procedure Laterality Date     COLONOSCOPY  06    WNL     [facility] ESOPHAGOSCOPY, DIAGNOSTIC      decreased tone at GE junction (Dr. Billings)     [facility] NONSPECIFIC PROCEDURE      Wisdon teeth removed     [facility] NONSPECIFIC PROCEDURE      Lymph node removal right axilla     [facility] NONSPECIFIC PROCEDURE      Colposcopy     [facility] UGI ENDOSCOPY, SIMPLE EXAM  06    WNL         Family History  Family History   Problem Relation Age of Onset     Cancer Mother 67        leukemia      C.A.D. Maternal Grandmother 80     Cerebrovascular Disease Maternal Grandmother      Neurologic Disorder Maternal Grandmother         parkinson's     Gastrointestinal Disease Maternal Grandmother      Osteoporosis Maternal Grandmother      Eye Disorder Maternal Grandfather         cataracts     C.A.D. 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       Cholecalciferol (VITAMIN D) 400 UNITS capsule Take 1 capsule by mouth daily.       isometheptene-acetaminophen-dichloralphenazone (MIDRIN) -100 MG per capsule 2  "capsules at headache onset followed by 1 capsule every hour as needed up to 5 capsules/day 30 capsule 0     LORazepam (ATIVAN) 0.5 MG tablet Take 1 tablet (0.5 mg) by mouth 2 times daily as needed (Panic attack) 30 tablet 0     MULTI-VITAMIN OR TABS Take 1 tablet by mouth daily  0     valACYclovir (VALTREX) 1000 mg tablet Take 2 tablets (2,000 mg) by mouth 2 times daily 4 tablet 11         ECOG Performance Status: 0    Physical Examination:  /80 (BP Location: Right arm, Patient Position: Sitting, Cuff Size: Adult Regular)   Pulse 87   Temp 97.7  F (36.5  C) (Oral)   Resp 18   Ht 1.68 m (5' 6.14\")   Wt 69.3 kg (152 lb 12.8 oz)   SpO2 99%   BMI 24.56 kg/m    General:  Well appearing, well-nourished adult female in NAD.  HEENT:  Normocephalic.  Sclera anicteric.  MMM.  No lesions of the oropharynx.  Breast: Left breast echymosis around the site of biopsy, no palpable mass  Lymph:  No cervical, supraclavicular, or axillary LAD.  Chest:  CTA bilaterally.  No wheezes or crackles.  CV:  RRR.  No murmurs or gallops  Abd:  Soft/NT/ND.  BS normoactive.  No hepatosplenomegaly.  Ext:  No pitting edema of the bilateral lower extremities.  Pulses 2+ and symmetric.  Musculo:  Strength 5/5 throughout.  Neuro:  Cranial nerves grossly intact.  Psych:  Mood and affect appear normal.      Laboratory Data:  Lab Results   Component Value Date    WBC 6.0 04/03/2008    HGB 12.5 04/03/2008    HCT 39.6 04/03/2008    MCV 92 04/03/2008     04/03/2008     No results found for: NA, HCO3, CREATININE, BUN, ANIONGAP, GLUCOSE  No results found for: ALT, AST, GGT, ALKPHOS, BILITOT  No results found for: INR, PT      Radiology data:  MA Stereotactic Breast Biopsy Vacuum Assist Left    Addendum Date: 5/3/2023    Addendum 1: Pathology results LEFT BREAST, 1:00, MID-DEPTH, CALCIFICATIONS, STEREOTACTIC-GUIDED CORE BIOPSY: DUCTAL CARCINOMA IN SITU (DCIS), nuclear grade 3, Calcifications associated with DCIS and benign epithelium.  DCIS " is ER positive (strong, >95%) and CA positive (moderate, 70%).  No evidence of invasion. Pathology results are concordant with radiological findings. RECOMMENDED FOLLOW-UP: Surgical Consultation. SLICK GERBER MD   SYSTEM ID:  [id number]    Result Date: 5/3/2023  EXAMINATION: MA STEREOTACTIC BREAST BIOPSY VACUUM LT, 5/1/2023 2:06 PM COMPARISON: 4/19/2023, 4/26/2023 HISTORY: Left breast grouped calcifications FINDINGS: Procedure, risks, benefits and alternatives were discussed with the patient and the patient gave written and verbal consent. The patient was positioned for stereotactic biopsy. Aseptic technique was utilized. Approximately 5 cc of 1% lidocaine with bicarbonate and 15 cc of 1% lidocaine with epinephrine were utilized for local anesthesia.  Using stereotactic technique and an 9 gauge vacuum assisted biopsy needle, multiple specimens were obtained. Calcifications are seen in the biopsy specimen. An hourglass shaped clip was placed.  Pressure was held over this area for approximately 15 minutes. A dressing was placed and care instructions were discussed with the patient. Post biopsy mammogram demonstrates clip deployment.     IMPRESSION: Uncomplicated stereotactic vacuum assisted core needle biopsy left breast. JANEL CALERO MD   SYSTEM ID:  [id number]     I have personally reviewed the images of / or the following radiology data: As detailed in the oncology timeline    Pathology and other data:  I have personally reviewed the following data: As detailed in the oncology timeline      Assessment and Recommendations  Luna Eugene  is a 52 year old perimenopausal woman with recent diagnosis of 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  She presents today for initial medical oncology consultation.    I had a lengthy discussion with the patient in which we reviewed her breast cancer diagnosis and workup history to date. I reviewed all of the relevant and available clinic notes, imaging, and pathology reports.  Since she has ER/AL+ DCIS and she is perimenopausal, discussed use of adjuvant tamoxifen to primarily reduce the risk of developing a new HR+ breast cancer based on data from NSABP B24.  We also briefly reviewed use of 5 mg of tamoxifen. We also reviewed data from ROSENBERG-01 which was a phase III trial randomizing patients with DCIS/other high risk breast dysplasia to 5 mg daily of tamoxifen vs placebo for 3 years. This trial demonstrated a 50% reduction in the risk for recurrence of ADH, DCIS, and LCIS. It also reduced the risk for new contralateral breast cancer by 75% compared with placebo.  I recommend obtaining an FSH today and if that is within in the postmenopausal range, we can wait for her to be without menstrual periods for 1 year prior to transitioning to aromatase inhibitors.  We did not discuss toxicities of these agents in depth today, but will do so at her follow-up visit with me.     For now, I recommend that she proceed with surgery first and I will finalize her treatment plan once her final pathology has resulted.       60 minutes spent on the date of the encounter doing chart review, review of test results, interpretation of tests, patient visit and documentation       Dame Karen MD   of Medicine  Division of Hematology, Oncology and Transplantation  [facility]

## 2023-05-05 NOTE — PROGRESS NOTES
New Patient Oncology Nurse Navigator Note     Referring provider: Dr. Bernadine Coon     Referring Clinic/Organization: [facility]     Referred to (specialty:) Genetic Counseling      Date Referral Received: May 5, 2023     Evaluation for:  D05.12 (ICD-10-CM) - Ductal carcinoma in situ (DCIS) of left breast     Writer received referral, reviewed for appropriate plan, and call transferred to New Patient Scheduling for completion.

## 2023-05-05 NOTE — TELEPHONE ENCOUNTER
Left message for patient to return call to [phone] for scheduling breast reconstruction consult in the coming 1-2 weeks per Dr Coon.

## 2023-05-08 ENCOUNTER — ONCOLOGY VISIT (OUTPATIENT)
Dept: ONCOLOGY | Facility: CLINIC | Age: 53
End: 2023-05-08
Attending: INTERNAL MEDICINE
Payer: COMMERCIAL

## 2023-05-08 ENCOUNTER — PATIENT OUTREACH (OUTPATIENT)
Dept: ONCOLOGY | Facility: CLINIC | Age: 53
End: 2023-05-08
Payer: COMMERCIAL

## 2023-05-08 VITALS
BODY MASS INDEX: 24.56 KG/M2 | DIASTOLIC BLOOD PRESSURE: 80 MMHG | WEIGHT: 152.8 LBS | HEART RATE: 87 BPM | RESPIRATION RATE: 18 BRPM | SYSTOLIC BLOOD PRESSURE: 117 MMHG | TEMPERATURE: 97.7 F | OXYGEN SATURATION: 99 % | HEIGHT: 66 IN

## 2023-05-08 DIAGNOSIS — C50.912 MALIGNANT NEOPLASM OF LEFT BREAST IN FEMALE, ESTROGEN RECEPTOR POSITIVE, UNSPECIFIED SITE OF BREAST (H): Primary | ICD-10-CM

## 2023-05-08 DIAGNOSIS — Z17.0 MALIGNANT NEOPLASM OF LEFT BREAST IN FEMALE, ESTROGEN RECEPTOR POSITIVE, UNSPECIFIED SITE OF BREAST (H): Primary | ICD-10-CM

## 2023-05-08 DIAGNOSIS — F41.0 PANIC DISORDER WITHOUT AGORAPHOBIA: ICD-10-CM

## 2023-05-08 LAB — FSH SERPL IRP2-ACNC: 132 MIU/ML

## 2023-05-08 PROCEDURE — 99212 OFFICE O/P EST SF 10 MIN: CPT | Performed by: INTERNAL MEDICINE

## 2023-05-08 PROCEDURE — 83001 ASSAY OF GONADOTROPIN (FSH): CPT | Performed by: INTERNAL MEDICINE

## 2023-05-08 PROCEDURE — 36415 COLL VENOUS BLD VENIPUNCTURE: CPT | Performed by: INTERNAL MEDICINE

## 2023-05-08 PROCEDURE — 99205 OFFICE O/P NEW HI 60 MIN: CPT | Performed by: INTERNAL MEDICINE

## 2023-05-08 RX ORDER — LORAZEPAM 0.5 MG/1
0.5 TABLET ORAL 2 TIMES DAILY PRN
Qty: 30 TABLET | Refills: 0 | Status: SHIPPED | OUTPATIENT
Start: 2023-05-08

## 2023-05-08 ASSESSMENT — PAIN SCALES - GENERAL: PAINLEVEL: NO PAIN (0)

## 2023-05-08 NOTE — NURSING NOTE
"Oncology Rooming Note    May 8, 2023 12:34 PM   Luna Eugene is a 52 year old female who presents for:    Chief Complaint   Patient presents with     Oncology Clinic Visit     NEW - DCIS OF LEFT BREAST     Initial Vitals: /80 (BP Location: Right arm, Patient Position: Sitting, Cuff Size: Adult Regular)   Pulse 87   Temp 97.7  F (36.5  C) (Oral)   Resp 18   Ht 1.68 m (5' 6.14\")   Wt 69.3 kg (152 lb 12.8 oz)   SpO2 99%   BMI 24.56 kg/m   Estimated body mass index is 24.56 kg/m  as calculated from the following:    Height as of this encounter: 1.68 m (5' 6.14\").    Weight as of this encounter: 69.3 kg (152 lb 12.8 oz). Body surface area is 1.8 meters squared.  No Pain (0) Comment: Data Unavailable   No LMP recorded. (Menstrual status: IUD).  Allergies reviewed: Yes  Medications reviewed: Yes    Medications: Medication refills not needed today.  Pharmacy name entered into EPIC:    [facility] #[id number] - [place] - [address]  [facility] #[id number] - [place], [place] - [address] & [facility] #[id number] - [place] - [address]    Clinical concerns: New patient.        Faustina Bernard CMA            "

## 2023-05-08 NOTE — TELEPHONE ENCOUNTER
Pt called back and LM. I called her back and Left message for patient to return call to [phone].

## 2023-05-08 NOTE — PROGRESS NOTES
[facility]: Cancer Care                                                                                          New pt of Dr Jones. Introduced RNCC role and gave some general information about the clinic, contact information for the clinic and the care team and who to call for what. Gave business card for Dr Jones with RNCC name on it. Pt verbalized understanding with no further questions.     Signature:  Jeanne Bernstein RN

## 2023-05-08 NOTE — LETTER
5/8/2023         RE: Luna Eugene  [address] [place]  [facility] [address]        Dear Colleague,    Thank you for referring your patient, Luna Eugene, to the [facility]. Please see a copy of my visit note below.    [facility]  Hematology/Oncology Consultation    Luna Eugene MRN# [id number]   Age: 52 year old YOB: 1970       CC: Breast Cancer      HPI: Luna Eugene is a 52 year old perimenopausal woman with recent diagnosis of 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  She presents today for initial medical oncology consultation.      Her full oncologic history is as follows:   Oncologic History  Patient Active Problem List    Diagnosis Date Noted    Malignant neoplasm of left breast in female, estrogen receptor positive, unspecified site of breast (H) 05/08/2023     Priority: High     Left breast DCIS  4/12/2022 last screening mammogram normal  4/19/2023 screening mammogram possible calcifications in the left breast at 1:00, posterior depth.  Left breast diagnostic mammo showed punctate and fine pleomorphic calcifications in the left breast at 1:00, middle depth measuring up to 5 mm.   5/1/2023 stereotactic guided core biopsy showing grade 3 DCIS with comedonecrosis and calcifications.  ER(3+,> 95%), IL(2+, 70%)        Herpes simplex labialis 09/23/2013     Priority: Medium    Allergic Rhinitis      Priority: Medium    Irritable bowel syndrome      Priority: Medium    Fibromyalgia      Priority: Medium    Classical migraine      Priority: Medium    Panic disorder without agoraphobia      Priority: Medium    Insomnia      Priority: Medium          Interval History  She is planned to have a contrast-enhanced mammogram tomorrow.  Genetic testing was done last week and is currently pending.      She is still processing her new diagnosis, and reports slightly increased anxiety and insomnia. She has otherwise been feeling well  without any headaches, visual changes, cough, SOB, chest pain, n/v, constipation/diarrhea, abdominal pain, focal weakness or numbness.       Past Medical History  Past Medical History:   Diagnosis Date    Allergic rhinitis     Anxiety     Classic migraines     Fibromyalgia     GERD (gastroesophageal reflux disease)     Insomnia     Irritable bowel syndrome     Kidney stones     Panic disorder without agoraphobia          Past Surgical History  Past Surgical History:   Procedure Laterality Date    COLONOSCOPY  06    WNL    [facility] ESOPHAGOSCOPY, DIAGNOSTIC      decreased tone at GE junction (Dr. Billings)    [facility] NONSPECIFIC PROCEDURE      [place] teeth removed    [facility] NONSPECIFIC PROCEDURE      Lymph node removal right axilla    [facility] NONSPECIFIC PROCEDURE      Colposcopy    [facility] UGI ENDOSCOPY, SIMPLE EXAM  06    WNL         Family History  Family History   Problem Relation Age of Onset    Cancer Mother 67        leukemia     C.A.D. Maternal Grandmother 80    Cerebrovascular Disease Maternal Grandmother     Neurologic Disorder Maternal Grandmother         parkinson's    Gastrointestinal Disease Maternal Grandmother     Osteoporosis Maternal Grandmother     Eye Disorder Maternal Grandfather         cataracts    C.A.D. 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      Cholecalciferol (VITAMIN D) 400 UNITS capsule Take 1 capsule by mouth daily.      isometheptene-acetaminophen-dichloralphenazone (MIDRIN) -100 MG per capsule 2 capsules at headache onset followed by 1 capsule every hour as needed up to 5 capsules/day 30 capsule 0    LORazepam (ATIVAN) 0.5 MG tablet Take 1 tablet (0.5 mg) by mouth 2 times daily as needed (Panic attack) 30 tablet 0    MULTI-VITAMIN OR TABS Take 1 tablet by mouth daily  0    valACYclovir (VALTREX) 1000 mg tablet Take 2 tablets (2,000 mg) by mouth 2 times daily 4 tablet 11         ECOG Performance Status: 0    Physical Examination:  /80 (BP Location: Right arm, Patient Position: Sitting, Cuff Size: Adult Regular)   Pulse 87   Temp 97.7  F (36.5  C) (Oral)   Resp 18   Ht 1.68 m (5' 6.14\")   Wt 69.3 kg (152 lb 12.8 oz)   SpO2 99%   BMI 24.56 kg/m    General:  Well appearing, well-nourished adult female in NAD.  HEENT:  Normocephalic.  Sclera anicteric.  MMM.  No lesions of the oropharynx.  Breast: Left breast echymosis around the site of biopsy, no palpable mass  Lymph:  No cervical, supraclavicular, or axillary LAD.  Chest:  CTA bilaterally.  No wheezes or crackles.  CV:  RRR.  No murmurs or gallops  Abd:  Soft/NT/ND.  BS normoactive.  No hepatosplenomegaly.  Ext:  No pitting edema of the bilateral lower extremities.  Pulses 2+ and symmetric.  Musculo:  Strength 5/5 throughout.  Neuro:  Cranial nerves grossly intact.  Psych:  Mood and affect appear normal.      Laboratory Data:  Lab Results   Component Value Date    WBC 6.0 04/03/2008    HGB 12.5 04/03/2008    HCT 39.6 04/03/2008    MCV 92 04/03/2008     04/03/2008     No results found for: NA, HCO3, CREATININE, BUN, ANIONGAP, GLUCOSE  No results found for: ALT, AST, GGT, ALKPHOS, BILITOT  No results found for: INR, PT      Radiology data:  MA Stereotactic Breast Biopsy Vacuum Assist Left    Addendum Date: 5/3/2023    Addendum 1: " Pathology results LEFT BREAST, 1:00, MID-DEPTH, CALCIFICATIONS, STEREOTACTIC-GUIDED CORE BIOPSY: DUCTAL CARCINOMA IN SITU (DCIS), nuclear grade 3, Calcifications associated with DCIS and benign epithelium.  DCIS is ER positive (strong, >95%) and CO positive (moderate, 70%).  No evidence of invasion. Pathology results are concordant with radiological findings. RECOMMENDED FOLLOW-UP: Surgical Consultation. SLICK GERBER MD   SYSTEM ID:  [id number]    Result Date: 5/3/2023  EXAMINATION: MA STEREOTACTIC BREAST BIOPSY VACUUM LT, 5/1/2023 2:06 PM COMPARISON: 4/19/2023, 4/26/2023 HISTORY: Left breast grouped calcifications FINDINGS: Procedure, risks, benefits and alternatives were discussed with the patient and the patient gave written and verbal consent. The patient was positioned for stereotactic biopsy. Aseptic technique was utilized. Approximately 5 cc of 1% lidocaine with bicarbonate and 15 cc of 1% lidocaine with epinephrine were utilized for local anesthesia.  Using stereotactic technique and an 9 gauge vacuum assisted biopsy needle, multiple specimens were obtained. Calcifications are seen in the biopsy specimen. An hourglass shaped clip was placed.  Pressure was held over this area for approximately 15 minutes. A dressing was placed and care instructions were discussed with the patient. Post biopsy mammogram demonstrates clip deployment.     IMPRESSION: Uncomplicated stereotactic vacuum assisted core needle biopsy left breast. JANEL CALERO MD   SYSTEM ID:  [id number]     I have personally reviewed the images of / or the following radiology data: As detailed in the oncology timeline    Pathology and other data:  I have personally reviewed the following data: As detailed in the oncology timeline      Assessment and Recommendations  Luna Eugene  is a 52 year old perimenopausal woman with recent diagnosis of 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  She presents today for initial medical oncology consultation.    I  had a lengthy discussion with the patient in which we reviewed her breast cancer diagnosis and workup history to date. I reviewed all of the relevant and available clinic notes, imaging, and pathology reports. Since she has ER/WI+ DCIS and she is perimenopausal, discussed use of adjuvant tamoxifen to primarily reduce the risk of developing a new HR+ breast cancer based on data from NSABP B24.  We also briefly reviewed use of 5 mg of tamoxifen. We also reviewed data from ROSENBERG-01 which was a phase III trial randomizing patients with DCIS/other high risk breast dysplasia to 5 mg daily of tamoxifen vs placebo for 3 years. This trial demonstrated a 50% reduction in the risk for recurrence of ADH, DCIS, and LCIS. It also reduced the risk for new contralateral breast cancer by 75% compared with placebo.  I recommend obtaining an FSH today and if that is within in the postmenopausal range, we can wait for her to be without menstrual periods for 1 year prior to transitioning to aromatase inhibitors.  We did not discuss toxicities of these agents in depth today, but will do so at her follow-up visit with me.     For now, I recommend that she proceed with surgery first and I will finalize her treatment plan once her final pathology has resulted.       60 minutes spent on the date of the encounter doing chart review, review of test results, interpretation of tests, patient visit and documentation       Dame Karen MD   of Medicine  Division of Hematology, Oncology and Transplantation  [facility]

## 2023-05-09 ENCOUNTER — ANCILLARY PROCEDURE (OUTPATIENT)
Dept: MAMMOGRAPHY | Facility: CLINIC | Age: 53
End: 2023-05-09
Attending: SURGERY
Payer: COMMERCIAL

## 2023-05-09 DIAGNOSIS — D05.12 DUCTAL CARCINOMA IN SITU (DCIS) OF LEFT BREAST: ICD-10-CM

## 2023-05-09 PROCEDURE — 77066 DX MAMMO INCL CAD BI: CPT | Performed by: STUDENT IN AN ORGANIZED HEALTH CARE EDUCATION/TRAINING PROGRAM

## 2023-05-09 RX ORDER — IOPAMIDOL 755 MG/ML
125 INJECTION, SOLUTION INTRAVASCULAR ONCE
Status: COMPLETED | OUTPATIENT
Start: 2023-05-09 | End: 2023-05-09

## 2023-05-09 RX ADMIN — IOPAMIDOL 85 ML: 755 INJECTION, SOLUTION INTRAVASCULAR at 13:53

## 2023-05-10 ENCOUNTER — LAB (OUTPATIENT)
Dept: LAB | Facility: CLINIC | Age: 53
End: 2023-05-10
Payer: COMMERCIAL

## 2023-05-10 DIAGNOSIS — D05.12 DUCTAL CARCINOMA IN SITU (DCIS) OF LEFT BREAST: Primary | ICD-10-CM

## 2023-05-10 PROCEDURE — 81162 BRCA1&2 GEN FULL SEQ DUP/DEL: CPT | Performed by: SURGERY

## 2023-05-10 PROCEDURE — G0452 MOLECULAR PATHOLOGY INTERPR: HCPCS | Mod: 26 | Performed by: STUDENT IN AN ORGANIZED HEALTH CARE EDUCATION/TRAINING PROGRAM

## 2023-05-15 DIAGNOSIS — D05.12 DUCTAL CARCINOMA IN SITU (DCIS) OF LEFT BREAST: Primary | ICD-10-CM

## 2023-05-15 LAB — INTERPRETATION: NORMAL

## 2023-05-15 RX ORDER — ACETAMINOPHEN 325 MG/1
975 TABLET ORAL ONCE
Status: CANCELLED | OUTPATIENT
Start: 2023-05-15 | End: 2023-05-15

## 2023-05-15 RX ORDER — CEFAZOLIN SODIUM 2 G/50ML
2 SOLUTION INTRAVENOUS SEE ADMIN INSTRUCTIONS
Status: CANCELLED | OUTPATIENT
Start: 2023-05-15

## 2023-05-15 RX ORDER — CEFAZOLIN SODIUM 2 G/50ML
2 SOLUTION INTRAVENOUS
Status: CANCELLED | OUTPATIENT
Start: 2023-05-15

## 2023-05-15 NOTE — PROGRESS NOTES
Contrast mammogram showed no additional areas of suspicion. Of note, the previously placed biopsy clip is slightly inferior to epicenter of the biopsy.    Genetic testing and plastic surgery consultation pending but patient would like to get scheduled for breast conservation. Orders placed for LEFT RFID seed-localized segmental mastectomy.    Bernadine Coon MD MS [facility] FACS  Associate Professor of Surgery  Division of Surgical Oncology  [facility]

## 2023-05-16 ENCOUNTER — OFFICE VISIT (OUTPATIENT)
Dept: PLASTIC SURGERY | Facility: CLINIC | Age: 53
End: 2023-05-16
Attending: PLASTIC SURGERY
Payer: COMMERCIAL

## 2023-05-16 ENCOUNTER — TELEPHONE (OUTPATIENT)
Dept: ONCOLOGY | Facility: CLINIC | Age: 53
End: 2023-05-16
Payer: COMMERCIAL

## 2023-05-16 VITALS
HEIGHT: 66 IN | DIASTOLIC BLOOD PRESSURE: 79 MMHG | RESPIRATION RATE: 16 BRPM | TEMPERATURE: 97.9 F | HEART RATE: 70 BPM | OXYGEN SATURATION: 99 % | SYSTOLIC BLOOD PRESSURE: 122 MMHG | WEIGHT: 152.8 LBS | BODY MASS INDEX: 24.56 KG/M2

## 2023-05-16 DIAGNOSIS — Z17.0 MALIGNANT NEOPLASM OF LEFT BREAST IN FEMALE, ESTROGEN RECEPTOR POSITIVE, UNSPECIFIED SITE OF BREAST (H): Primary | ICD-10-CM

## 2023-05-16 DIAGNOSIS — C50.912 MALIGNANT NEOPLASM OF LEFT BREAST IN FEMALE, ESTROGEN RECEPTOR POSITIVE, UNSPECIFIED SITE OF BREAST (H): Primary | ICD-10-CM

## 2023-05-16 DIAGNOSIS — D05.12 DUCTAL CARCINOMA IN SITU (DCIS) OF LEFT BREAST: ICD-10-CM

## 2023-05-16 PROCEDURE — 99205 OFFICE O/P NEW HI 60 MIN: CPT | Performed by: PLASTIC SURGERY

## 2023-05-16 PROCEDURE — G0463 HOSPITAL OUTPT CLINIC VISIT: HCPCS | Performed by: PLASTIC SURGERY

## 2023-05-16 PROCEDURE — 99213 OFFICE O/P EST LOW 20 MIN: CPT | Performed by: PLASTIC SURGERY

## 2023-05-16 RX ORDER — TRETINOIN 0.5 MG/G
CREAM TOPICAL AT BEDTIME
COMMUNITY

## 2023-05-16 ASSESSMENT — PAIN SCALES - GENERAL: PAINLEVEL: NO PAIN (0)

## 2023-05-16 NOTE — NURSING NOTE
"Oncology Rooming Note    May 16, 2023 7:56 AM   Luna Eugene is a 52 year old female who presents for:    Chief Complaint   Patient presents with     Oncology Clinic Visit     DCIS of left breast     Initial Vitals: /79 (BP Location: Right arm, Patient Position: Sitting, Cuff Size: Adult Regular)   Pulse 70   Temp 97.9  F (36.6  C) (Oral)   Resp 16   Ht 1.68 m (5' 6.14\")   Wt 69.3 kg (152 lb 12.8 oz)   SpO2 99%   BMI 24.56 kg/m   Estimated body mass index is 24.56 kg/m  as calculated from the following:    Height as of this encounter: 1.68 m (5' 6.14\").    Weight as of this encounter: 69.3 kg (152 lb 12.8 oz). Body surface area is 1.8 meters squared.  No Pain (0) Comment: Data Unavailable   No LMP recorded. (Menstrual status: UNKNOWN).  Allergies reviewed: Yes  Medications reviewed: Yes    Medications: Medication refills not needed today.  Pharmacy name entered into EPIC:    [facility] #[id number] - [place] - [address]  [id number] - [place], [place] - [address] & [facility] [place]/PHARMACY #[id number] - [place] - [address]    Clinical concerns: New patient consult.       Goran Vasquez              "

## 2023-05-16 NOTE — LETTER
5/16/2023         RE: Luna Eugene  [address] [facility] [address]        Dear Colleague,    Thank you for referring your patient, Luna Eugene, to the [facility]. Please see a copy of my visit note below.    REFERRING PROVIDER:  Bernadine Coon MD    PRESENTING COMPLAINT:  Consultation for breast reconstruction.    HISTORY OF PRESENTING COMPLAINT:  Ms. Eugene is 52 years old.  She was diagnosed with left-sided breast cancer, undergoing most likely a lumpectomy and radiation therapy.  However, her genetic workup is still pending and if it comes back positive, she will require a bilateral mastectomy and is interested in reconstruction.  She wears a B cup and wants to be around the same size.    PAST MEDICAL HISTORY:  None.    PAST SURGICAL HISTORY:  None.    MEDICATIONS:  None.    ALLERGIES:  None.    SOCIAL HISTORY:  She is a teacher, lives in [place].  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    REVIEW OF SYSTEMS:  Denies chest pain, shortness of breath, MI, CVA, DVT and PE.    PHYSICAL EXAMINATION:  Vital signs stable.  She is afebrile, in no obvious distress.  She is 5 feet 6 inches, 152 pounds, BMI 25 kg/m2.  On examination of her breasts, she has grade 2 ptosis.  B cup size breasts.  She has no scars on the abdomen.  She has enough tissue to give her the size that she currently is.    ASSESSMENT AND PLAN:  Based on above findings, a diagnosis of left breast cancer, undergoing either a lumpectomy and radiation therapy or mastectomy, here to discuss reconstruction was made.  I had a cristal detailed discussion with the patient in presence of my nurse, Carrie Vasquez, who was present from beginning until the end.  I discussed with the patient the concept behind breast reconstruction in the face of mastectomies and immediate versus delayed, implant-based versus autologous and the pros and cons of each.  The patient is interested in autologous reconstruction if that need arises.   The plan will be 3 stages.  Stage 1 mastectomy and expander placement, stage 2 VEENA flap and stage 3 touch-up surgeries.  This was all explained in detail with the patient.    If, however, her genetic workup comes back negative, she will undergo a lumpectomy.  I will leave it up to Dr. Coon and the patient if oncoplastic reconstruction is required or not.  I am happy to help out with that as well.  The concept of oncoplastic reconstruction was also explained to the patient.  She will discuss that with Dr. Coon and let me know if she wants to proceed.  She is currently on the books for 05/25/2023 for the lumpectomy.  All questions were answered in detail.  All expectations and perioperative and postoperative questions were answered in detail.  She understood everything and was happy with the visit.  I look forward to helping her out as needed.    Total time spent in the encounter today, including chart review, the visit itself and post-visit paperwork was 60 minutes.    MIRTHA Martin MD    cc:  Bernadine Coon MD  [address], [address], [address]

## 2023-05-16 NOTE — TELEPHONE ENCOUNTER
RN Care Coordinator: Jocelyn Hanson RN     Surgery is scheduled with Dr. Bernadine Coon  Date: 5/25/2023   [facility]  Scheduled per next available date    Patient to have Tag Localization placement - scheduled by Breast Center and communicated to patient by Breast Center.     H&P to be completed by Primary Care team; patient to schedule     COVID-19 test: n/a    Post-op: 6/8/2023 at 7:15am, in person visit    Patient will receive a phone call from pre-admission nurses 1-2 days prior to surgery with arrival and start time.     Spoke with the patient and was able to confirm all scheduled information.     The surgery packet was provided by the [facility]    Surgery and appointment overview was sent via [facility]    ______________    Holly Vasquez on 5/16/2023 at 10:38 AM  P: [phone]

## 2023-05-16 NOTE — PROGRESS NOTES
REFERRING PROVIDER:  Bernadine Coon MD    PRESENTING COMPLAINT:  Consultation for breast reconstruction.    HISTORY OF PRESENTING COMPLAINT:  Ms. Eugene is 52 years old.  She was diagnosed with left-sided breast cancer, undergoing most likely a lumpectomy and radiation therapy.  However, her genetic workup is still pending and if it comes back positive, she will require a bilateral mastectomy and is interested in reconstruction.  She wears a B cup and wants to be around the same size.    PAST MEDICAL HISTORY:  None.    PAST SURGICAL HISTORY:  None.    MEDICATIONS:  None.    ALLERGIES:  None.    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    REVIEW OF SYSTEMS:  Denies chest pain, shortness of breath, MI, CVA, DVT and PE.    PHYSICAL EXAMINATION:  Vital signs stable.  She is afebrile, in no obvious distress.  She is 5 feet 6 inches, 152 pounds, BMI 25 kg/m2.  On examination of her breasts, she has grade 2 ptosis.  B cup size breasts.  She has no scars on the abdomen.  She has enough tissue to give her the size that she currently is.    ASSESSMENT AND PLAN:  Based on above findings, a diagnosis of left breast cancer, undergoing either a lumpectomy and radiation therapy or mastectomy, here to discuss reconstruction was made.  I had a cristal detailed discussion with the patient in presence of my nurse, Carrie Vasquez, who was present from beginning until the end.  I discussed with the patient the concept behind breast reconstruction in the face of mastectomies and immediate versus delayed, implant-based versus autologous and the pros and cons of each.  The patient is interested in autologous reconstruction if that need arises.  The plan will be 3 stages.  Stage 1 mastectomy and expander placement, stage 2 VEENA flap and stage 3 touch-up surgeries.  This was all explained in detail with the patient.    If, however, her genetic workup comes back negative, she will undergo a lumpectomy.  I  will leave it up to Dr. Coon and the patient if oncoplastic reconstruction is required or not.  I am happy to help out with that as well.  The concept of oncoplastic reconstruction was also explained to the patient.  She will discuss that with Dr. Coon and let me know if she wants to proceed.  She is currently on the books for 05/25/2023 for the lumpectomy.  All questions were answered in detail.  All expectations and perioperative and postoperative questions were answered in detail.  She understood everything and was happy with the visit.  I look forward to helping her out as needed.    Total time spent in the encounter today, including chart review, the visit itself and post-visit paperwork was 60 minutes.    MIRTHA Martin MD    cc:  Bernadine Coon MD  [address], [address], [address]

## 2023-05-23 ENCOUNTER — ANCILLARY PROCEDURE (OUTPATIENT)
Dept: MAMMOGRAPHY | Facility: CLINIC | Age: 53
End: 2023-05-23
Attending: SURGERY
Payer: COMMERCIAL

## 2023-05-23 DIAGNOSIS — D05.12 DUCTAL CARCINOMA IN SITU (DCIS) OF LEFT BREAST: ICD-10-CM

## 2023-05-23 PROCEDURE — 19281 PERQ DEVICE BREAST 1ST IMAG: CPT | Mod: LT | Performed by: STUDENT IN AN ORGANIZED HEALTH CARE EDUCATION/TRAINING PROGRAM

## 2023-05-24 ENCOUNTER — ANESTHESIA EVENT (OUTPATIENT)
Dept: SURGERY | Facility: AMBULATORY SURGERY CENTER | Age: 53
End: 2023-05-24
Payer: COMMERCIAL

## 2023-05-24 RX ORDER — OXYCODONE HYDROCHLORIDE 5 MG/1
5 TABLET ORAL
Status: CANCELLED | OUTPATIENT
Start: 2023-05-24

## 2023-05-24 RX ORDER — HYDROMORPHONE HYDROCHLORIDE 1 MG/ML
0.4 INJECTION, SOLUTION INTRAMUSCULAR; INTRAVENOUS; SUBCUTANEOUS EVERY 5 MIN PRN
Status: CANCELLED | OUTPATIENT
Start: 2023-05-24

## 2023-05-24 RX ORDER — ONDANSETRON 4 MG/1
4 TABLET, ORALLY DISINTEGRATING ORAL EVERY 30 MIN PRN
Status: CANCELLED | OUTPATIENT
Start: 2023-05-24

## 2023-05-24 RX ORDER — HYDROMORPHONE HYDROCHLORIDE 1 MG/ML
0.2 INJECTION, SOLUTION INTRAMUSCULAR; INTRAVENOUS; SUBCUTANEOUS EVERY 5 MIN PRN
Status: CANCELLED | OUTPATIENT
Start: 2023-05-24

## 2023-05-24 RX ORDER — SODIUM CHLORIDE, SODIUM LACTATE, POTASSIUM CHLORIDE, CALCIUM CHLORIDE 600; 310; 30; 20 MG/100ML; MG/100ML; MG/100ML; MG/100ML
INJECTION, SOLUTION INTRAVENOUS CONTINUOUS
Status: CANCELLED | OUTPATIENT
Start: 2023-05-24

## 2023-05-24 RX ORDER — ONDANSETRON 2 MG/ML
4 INJECTION INTRAMUSCULAR; INTRAVENOUS EVERY 30 MIN PRN
Status: CANCELLED | OUTPATIENT
Start: 2023-05-24

## 2023-05-24 RX ORDER — OXYCODONE HYDROCHLORIDE 5 MG/1
10 TABLET ORAL
Status: CANCELLED | OUTPATIENT
Start: 2023-05-24

## 2023-05-24 RX ORDER — FENTANYL CITRATE 50 UG/ML
50 INJECTION, SOLUTION INTRAMUSCULAR; INTRAVENOUS EVERY 5 MIN PRN
Status: CANCELLED | OUTPATIENT
Start: 2023-05-24

## 2023-05-24 RX ORDER — FENTANYL CITRATE 50 UG/ML
25 INJECTION, SOLUTION INTRAMUSCULAR; INTRAVENOUS EVERY 5 MIN PRN
Status: CANCELLED | OUTPATIENT
Start: 2023-05-24

## 2023-05-25 ENCOUNTER — HOSPITAL ENCOUNTER (OUTPATIENT)
Facility: AMBULATORY SURGERY CENTER | Age: 53
Discharge: HOME | End: 2023-05-25
Attending: SURGERY
Payer: COMMERCIAL

## 2023-05-25 ENCOUNTER — ANESTHESIA (OUTPATIENT)
Dept: SURGERY | Facility: AMBULATORY SURGERY CENTER | Age: 53
End: 2023-05-25
Payer: COMMERCIAL

## 2023-05-25 ENCOUNTER — ANCILLARY PROCEDURE (OUTPATIENT)
Dept: MAMMOGRAPHY | Facility: CLINIC | Age: 53
End: 2023-05-25
Attending: SURGERY
Payer: COMMERCIAL

## 2023-05-25 VITALS
DIASTOLIC BLOOD PRESSURE: 74 MMHG | BODY MASS INDEX: 24.43 KG/M2 | TEMPERATURE: 97.5 F | OXYGEN SATURATION: 98 % | WEIGHT: 152 LBS | HEART RATE: 78 BPM | HEIGHT: 66 IN | RESPIRATION RATE: 16 BRPM | SYSTOLIC BLOOD PRESSURE: 120 MMHG

## 2023-05-25 DIAGNOSIS — D05.12 DUCTAL CARCINOMA IN SITU (DCIS) OF LEFT BREAST: ICD-10-CM

## 2023-05-25 PROCEDURE — 88307 TISSUE EXAM BY PATHOLOGIST: CPT | Mod: 26 | Performed by: PATHOLOGY

## 2023-05-25 PROCEDURE — 88305 TISSUE EXAM BY PATHOLOGIST: CPT | Mod: 26 | Performed by: PATHOLOGY

## 2023-05-25 PROCEDURE — 19301 PARTIAL MASTECTOMY: CPT | Mod: LT

## 2023-05-25 PROCEDURE — 19301 PARTIAL MASTECTOMY: CPT | Mod: LT | Performed by: SURGERY

## 2023-05-25 PROCEDURE — 76098 X-RAY EXAM SURGICAL SPECIMEN: CPT | Performed by: RADIOLOGY

## 2023-05-25 PROCEDURE — 88305 TISSUE EXAM BY PATHOLOGIST: CPT | Mod: TC | Performed by: SURGERY

## 2023-05-25 RX ORDER — PROPOFOL 10 MG/ML
INJECTION, EMULSION INTRAVENOUS CONTINUOUS PRN
Status: DISCONTINUED | OUTPATIENT
Start: 2023-05-25 | End: 2023-05-25

## 2023-05-25 RX ORDER — LIDOCAINE 40 MG/G
CREAM TOPICAL
Status: DISCONTINUED | OUTPATIENT
Start: 2023-05-25 | End: 2023-05-26 | Stop reason: HOSPADM

## 2023-05-25 RX ORDER — ACETAMINOPHEN 325 MG/1
975 TABLET ORAL ONCE
Status: COMPLETED | OUTPATIENT
Start: 2023-05-25 | End: 2023-05-25

## 2023-05-25 RX ORDER — FENTANYL CITRATE 50 UG/ML
25-50 INJECTION, SOLUTION INTRAMUSCULAR; INTRAVENOUS
Status: DISCONTINUED | OUTPATIENT
Start: 2023-05-25 | End: 2023-05-26 | Stop reason: HOSPADM

## 2023-05-25 RX ORDER — NALOXONE HYDROCHLORIDE 0.4 MG/ML
0.4 INJECTION, SOLUTION INTRAMUSCULAR; INTRAVENOUS; SUBCUTANEOUS
Status: DISCONTINUED | OUTPATIENT
Start: 2023-05-25 | End: 2023-05-26 | Stop reason: HOSPADM

## 2023-05-25 RX ORDER — CEFAZOLIN SODIUM 2 G/50ML
2 SOLUTION INTRAVENOUS SEE ADMIN INSTRUCTIONS
Status: DISCONTINUED | OUTPATIENT
Start: 2023-05-25 | End: 2023-05-26 | Stop reason: HOSPADM

## 2023-05-25 RX ORDER — SODIUM CHLORIDE, SODIUM LACTATE, POTASSIUM CHLORIDE, CALCIUM CHLORIDE 600; 310; 30; 20 MG/100ML; MG/100ML; MG/100ML; MG/100ML
INJECTION, SOLUTION INTRAVENOUS CONTINUOUS
Status: DISCONTINUED | OUTPATIENT
Start: 2023-05-25 | End: 2023-05-26 | Stop reason: HOSPADM

## 2023-05-25 RX ORDER — NALOXONE HYDROCHLORIDE 0.4 MG/ML
0.2 INJECTION, SOLUTION INTRAMUSCULAR; INTRAVENOUS; SUBCUTANEOUS
Status: DISCONTINUED | OUTPATIENT
Start: 2023-05-25 | End: 2023-05-26 | Stop reason: HOSPADM

## 2023-05-25 RX ORDER — ONDANSETRON 2 MG/ML
INJECTION INTRAMUSCULAR; INTRAVENOUS PRN
Status: DISCONTINUED | OUTPATIENT
Start: 2023-05-25 | End: 2023-05-25

## 2023-05-25 RX ORDER — FENTANYL CITRATE 50 UG/ML
INJECTION, SOLUTION INTRAMUSCULAR; INTRAVENOUS PRN
Status: DISCONTINUED | OUTPATIENT
Start: 2023-05-25 | End: 2023-05-25

## 2023-05-25 RX ORDER — ACETAMINOPHEN 325 MG/1
975 TABLET ORAL ONCE
Status: DISCONTINUED | OUTPATIENT
Start: 2023-05-25 | End: 2023-05-26 | Stop reason: HOSPADM

## 2023-05-25 RX ORDER — BUPIVACAINE HYDROCHLORIDE 2.5 MG/ML
INJECTION, SOLUTION EPIDURAL; INFILTRATION; INTRACAUDAL
Status: COMPLETED | OUTPATIENT
Start: 2023-05-25 | End: 2023-05-25

## 2023-05-25 RX ORDER — PROPOFOL 10 MG/ML
INJECTION, EMULSION INTRAVENOUS PRN
Status: DISCONTINUED | OUTPATIENT
Start: 2023-05-25 | End: 2023-05-25

## 2023-05-25 RX ORDER — DEXAMETHASONE SODIUM PHOSPHATE 4 MG/ML
INJECTION, SOLUTION INTRA-ARTICULAR; INTRALESIONAL; INTRAMUSCULAR; INTRAVENOUS; SOFT TISSUE PRN
Status: DISCONTINUED | OUTPATIENT
Start: 2023-05-25 | End: 2023-05-25

## 2023-05-25 RX ORDER — CEFAZOLIN SODIUM 2 G/50ML
2 SOLUTION INTRAVENOUS
Status: COMPLETED | OUTPATIENT
Start: 2023-05-25 | End: 2023-05-25

## 2023-05-25 RX ORDER — FLUMAZENIL 0.1 MG/ML
0.2 INJECTION, SOLUTION INTRAVENOUS
Status: DISCONTINUED | OUTPATIENT
Start: 2023-05-25 | End: 2023-05-26 | Stop reason: HOSPADM

## 2023-05-25 RX ORDER — LIDOCAINE HYDROCHLORIDE 20 MG/ML
INJECTION, SOLUTION INFILTRATION; PERINEURAL PRN
Status: DISCONTINUED | OUTPATIENT
Start: 2023-05-25 | End: 2023-05-25

## 2023-05-25 RX ADMIN — LIDOCAINE HYDROCHLORIDE 60 MG: 20 INJECTION, SOLUTION INFILTRATION; PERINEURAL at 11:59

## 2023-05-25 RX ADMIN — ONDANSETRON 4 MG: 2 INJECTION INTRAMUSCULAR; INTRAVENOUS at 12:02

## 2023-05-25 RX ADMIN — FENTANYL CITRATE 50 MCG: 50 INJECTION, SOLUTION INTRAMUSCULAR; INTRAVENOUS at 11:28

## 2023-05-25 RX ADMIN — DEXAMETHASONE SODIUM PHOSPHATE 4 MG: 4 INJECTION, SOLUTION INTRA-ARTICULAR; INTRALESIONAL; INTRAMUSCULAR; INTRAVENOUS; SOFT TISSUE at 12:02

## 2023-05-25 RX ADMIN — PROPOFOL 150 MCG/KG/MIN: 10 INJECTION, EMULSION INTRAVENOUS at 12:35

## 2023-05-25 RX ADMIN — CEFAZOLIN SODIUM 2 G: 2 SOLUTION INTRAVENOUS at 11:50

## 2023-05-25 RX ADMIN — PROPOFOL 150 MCG/KG/MIN: 10 INJECTION, EMULSION INTRAVENOUS at 13:04

## 2023-05-25 RX ADMIN — SODIUM CHLORIDE, SODIUM LACTATE, POTASSIUM CHLORIDE, CALCIUM CHLORIDE: 600; 310; 30; 20 INJECTION, SOLUTION INTRAVENOUS at 11:15

## 2023-05-25 RX ADMIN — ACETAMINOPHEN 975 MG: 325 TABLET ORAL at 10:57

## 2023-05-25 RX ADMIN — FENTANYL CITRATE 50 MCG: 50 INJECTION, SOLUTION INTRAMUSCULAR; INTRAVENOUS at 12:35

## 2023-05-25 RX ADMIN — PROPOFOL 200 MCG/KG/MIN: 10 INJECTION, EMULSION INTRAVENOUS at 11:59

## 2023-05-25 RX ADMIN — PROPOFOL 200 MG: 10 INJECTION, EMULSION INTRAVENOUS at 11:59

## 2023-05-25 RX ADMIN — BUPIVACAINE HYDROCHLORIDE 20 ML: 2.5 INJECTION, SOLUTION EPIDURAL; INFILTRATION; INTRACAUDAL at 11:30

## 2023-05-25 NOTE — PROGRESS NOTES
Patient seen in preop. Postoperative instructions reviewed with Luna Eugene and her  Vinnie.  Patient has been unable to remove her ring from the left 4th finger. The finger distal to the ring is very swollen. Agrees to have it cut off for surgery due to concerns of risks of electrothermal injury and further swelling.     Bernadine Coon MD MS [facility] FACS  Associate Professor of Surgery  Division of Surgical Oncology  [facility]

## 2023-05-25 NOTE — ANESTHESIA PROCEDURE NOTES
"Pectoralis Procedure Note    Pre-Procedure   Staff -        Anesthesiologist:  Dany German MD       Resident/Fellow: James Nelson MD       Performed By: resident       Procedure Start/Stop Times: 5/25/2023 11:30 AM       Pre-Anesthestic Checklist: patient identified, IV checked, site marked, risks and benefits discussed, informed consent, monitors and equipment checked, pre-op evaluation, at physician/surgeon's request and post-op pain management  Timeout:       Correct Patient: Yes        Correct Procedure: Yes        Correct Site: Yes        Correct Position: Yes        Correct Laterality: Yes        Site Marked: Yes  Procedure Documentation  Procedure: Pectoralis             Pectoralis I and Pectoralis II       Laterality: left       Patient Position: sitting       Skin prep: Chloraprep       Needle Type: NeuroMetrix needle       Needle Gauge: 21.        Needle Length (Inches): 3.13        Ultrasound guided       1. Ultrasound was used to identify targeted nerve, plexus, vascular marker, or fascial plane and place a needle adjacent to it in real-time.       2. Ultrasound was used to visualize the spread of anesthetic in close proximity to the above referenced structure.       3. A permanent image is entered into the patient's record.    Assessment/Narrative         The placement was negative for: blood aspirated, painful injection and site bleeding       Paresthesias: No.       Bolus given via needle..        Secured via.        Insertion/Infusion Method: Single Shot       Complications: none    Medication(s) Administered   Bupivacaine 0.25% PF (Infiltration) - Infiltration   20 mL - 5/25/2023 11:30:00 AM  Bupivacaine liposome (Exparel) 1.3% LA inj susp (Infiltration) - Infiltration   20 mL - 5/25/2023 11:30:00 AM  Medication Administration Time: 5/25/2023 11:30 AM      FOR [facility] ([facility] [place] [place]) ONLY:   Pain Team Contact information: please page the Pain Team Via [employer]. Search \"Pain\". During " daytime hours, please page the attending first. At night please page the resident first.

## 2023-05-25 NOTE — ANESTHESIA CARE TRANSFER NOTE
"  Patient: Luna Eugene    Procedure: Procedure(s):  LEFT Radiofrequency Identification Seed-Localized Segmental Mastectomy (=\"Lumpectomy\")       Diagnosis: Ductal carcinoma in situ (DCIS) of left breast [D05.12]  Diagnosis Additional Information: No value filed.    Anesthesia Type:   No value filed.     Note:    Oropharynx: oropharynx clear of all foreign objects  Level of Consciousness: awake  Oxygen Supplementation: nasal cannula  Level of Supplemental Oxygen (L/min / FiO2): 2  Independent Airway: airway patency satisfactory and stable  Dentition: dentition unchanged      Patient transferred to: PACU  Comments: VSS and WNL, comfortable, no PONV, report to Piper ZULUAGA  Handoff Report: Identifed the Patient, Identified the Reponsible Provider, Reviewed the pertinent medical history, Discussed the surgical course, Reviewed Intra-OP anesthesia mangement and issues during anesthesia, Set expectations for post-procedure period and Allowed opportunity for questions and acknowledgement of understanding      Vitals:  Vitals Value Taken Time   /73 05/25/23 1316   Temp     Pulse 82 05/25/23 1319   Resp 13 05/25/23 1319   SpO2 96 % 05/25/23 1319   Vitals shown include unvalidated device data.    Electronically Signed By: DANNY Adams CRNA  May 25, 2023  1:20 PM  "

## 2023-05-25 NOTE — OP NOTE
DATE OF SURGERY: May 25, 2023     SURGEON: Bernadine Coon MD  ASSISTANT(S): Aravind Du MD PGY-4    PREOPERATIVE DIAGNOSIS: Ductal carcinoma in situ, left upper outer breast  POSTOPERATIVE DIAGNOSIS: same    PROCEDURE(S): Left RFID seed-localized segmental mastectomy     ANESTHESIA: General + Block    CLINICAL NOTE:  Luna Eugene is a 52 year old woman with DCIS of the left breast.  The risks and benefits of RFID seed-localized segmental mastectomy were discussed with the patient and she elected to proceed with informed consent.    OPERATIVE FINDINGS:  1. Specimen radiograph confirmed presence of the RFID seed and biopsy clip within the specimen.  The RFID seed seemed to approach the superior/posterior edge of the specimen based on review with radiology. Thus an additional superior/posterior margin was taken, inked and sent.    OPERATIVE PROCEDURE:  The radiofrequency identification (RFID) seed localization images were personally reviewed by me prior to the procedure. The patient was brought to the operating room and placed in the supine position with appropriate padding for all of the pressure points.  A general anesthetic was administered by the Anesthesia team.  A surgical safety checklist was performed to confirm the patient's identity, the site and laterality of the procedure. Perioperative antibiotics (Ancef) was provided.  VTE prophylaxis was provided with serial compression devices. The left  breast was then prepped and draped in the usual sterile fashion using Chloraprep.     A curvilinear incision was made in the upper outer quadrant of the left breast.  Superficial skin flaps were raised.  The breast tissue denoted by the localizing RFID seed (ID [id number]) was dissected out, with careful attention paid to resect this area with a grossly normal margin of surrounding breast tissue.  The specimen was inked and radiographed.  It was found to contain the RFID seed and biopsy clip.  The specimen was then  sent to pathology.  In consultation with radiology, it was felt that the superior/posterior edge of the specimen was close to the RFID seed. Thus, an additional superior/posterior margin was taken (now down to the pectoralis major muscle), inked, and sent to pathology.  The wound was irrigated and hemostasis was achieved.  Hemoclips were placed to ana laura the edges of the cavity: medial, inferior, lateral, superior, and posterior (deep).  The incision was closed in two layers with interrupted 3-0 vicryl and a running subcuticular 4-0 monocryl.  Steristrips and dressings were applied.  The chest was wrapped in an ACE bandage.  The patient tolerated the procedure well. The sponge, needle, instrument counts were correct.  The patient was then awakened and taken to recovery in stable fashion.      I was present and scrubbed for the entire above procedure.    Dr. Du actively first assisted during this operation providing necessary retraction and exposure as well as maintaining hemostasis and clear operative field.  This was helpful in allowing the operation to proceed in a safe and expeditious manner.  They were present for the entire duration of the critical portions of the operation.    EBL: 3 mL    SPECIMEN(S):  A. Left breast mass  B. Left breast, new superior/posterior margin    POSTOPERATIVE PLANS:  Luna Eugene will be discharged home today with wound care instructions and no prescription for analgesics.  She will follow-up with me in 2 weeks.     Bernadine Coon MD MSc [facility] FACS  Associate Professor of Surgery  Division of Surgical Oncology  [facility]

## 2023-05-25 NOTE — OR NURSING
Patient received left side Pectoralis nerve block  with Exparel.  Fentanyl 50mcg and Versed 2mg given. Tolerated procedure well.

## 2023-05-25 NOTE — ANESTHESIA PREPROCEDURE EVALUATION
"Anesthesia Pre-Procedure Evaluation    Patient: Luna Eugene   MRN: [id number] : 1970        Procedure : Procedure(s):  LEFT Radiofrequency Identification Seed-Localized Segmental Mastectomy (=\"Lumpectomy\")          Past Medical History:   Diagnosis Date     Allergic rhinitis      Anxiety      Classic migraines      Fibromyalgia      GERD (gastroesophageal reflux disease)      Insomnia      Irritable bowel syndrome      Kidney stones      Panic disorder without agoraphobia       Past Surgical History:   Procedure Laterality Date     COLONOSCOPY  06    WNL     [facility] ESOPHAGOSCOPY, DIAGNOSTIC      decreased tone at GE junction (Dr. Billings)     [facility] NONSPECIFIC PROCEDURE      [place] teeth removed     [facility] NONSPECIFIC PROCEDURE      Lymph node removal right axilla     [facility] NONSPECIFIC PROCEDURE      Colposcopy     [facility] UGI ENDOSCOPY, SIMPLE EXAM  06    WNL      Allergies   Allergen Reactions     No Known Allergies       Social History     Tobacco Use     Smoking status: Never     Passive exposure: Never     Smokeless tobacco: Never   Vaping Use     Vaping status: Not on file   Substance Use Topics     Alcohol use: Yes     Comment: socially      Wt Readings from Last 1 Encounters:   23 68.9 kg (152 lb)        Anesthesia Evaluation            ROS/MED HX  ENT/Pulmonary:     (+) allergic rhinitis,     Neurologic:     (+) migraines,     Cardiovascular:       METS/Exercise Tolerance:     Hematologic:       Musculoskeletal:       GI/Hepatic:     (+) GERD,     Renal/Genitourinary:       Endo:       Psychiatric/Substance Use:     (+) psychiatric history anxiety     Infectious Disease:       Malignancy:       Other:               OUTSIDE LABS:  CBC:   Lab Results   Component Value Date    WBC 6.0 2008    WBC 6.4 2002    HGB 12.5 2008    HGB 12.3 2002    HCT 39.6 2008    HCT 37.6 2002     2008     BMP:   Lab Results   Component Value Date    GLC 84 " 12/16/2015    GLC 89 08/29/2011     COAGS: No results found for: PTT, INR, FIBR  POC:   Lab Results   Component Value Date    HCG Negative 11/01/2007     HEPATIC: No results found for: ALBUMIN, PROTTOTAL, ALT, AST, GGT, ALKPHOS, BILITOTAL, BILIDIRECT, CYRUS  OTHER:   Lab Results   Component Value Date    TSH 2.41 08/29/2011    SED 2 04/03/2008       Anesthesia Plan    ASA Status:  2   NPO Status:  NPO Appropriate    Anesthesia Type: General.     - Airway: LMA   Induction: Intravenous.   Maintenance: TIVA.        Consents    Anesthesia Plan(s) and associated risks, benefits, and realistic alternatives discussed. Questions answered and patient/representative(s) expressed understanding.    - Discussed:     - Discussed with:  Patient         Postoperative Care    Pain management: Oral pain medications, Multi-modal analgesia, Peripheral nerve block (Single Shot).   PONV prophylaxis: Ondansetron (or other 5HT-3), Dexamethasone or Solumedrol     Comments:                James Nelson MD

## 2023-05-25 NOTE — DISCHARGE INSTRUCTIONS
[facility]  Home Care Following Anesthesia  For 24 hours after surgery:  Get plenty of rest.  A responsible adult must stay with you for at least 24 hours after you leave the surgery center.  Do not drive or use heavy equipment.  If you have weakness or tingling, don't drive or use heavy equipment until this feeling goes away.   Do not drink alcohol.   Avoid strenuous or risky activities.  Ask for help when climbing stairs.  You may feel lightheaded.  IF so, sit for a few minutes before standing.  Have someone help you get up.   If you have nausea (feel sick to your stomach): Drink only clear liquids such as apple juice, ginger ale, broth or 7-Up.  Rest may also help.  Be sure to drink enough fluids.  Move to a regular diet as you feel able.   You may have a slight fever.  Call the doctor if your fever is over 100 F (37.7 C) (taken under the tongue) or lasts longer than 24 hours.  You may have a dry mouth, a sore throat, muscle aches or trouble sleeping. These should go away after 24 hours.  Do not make important or legal decisions.   It is recommended to avoid smoking.               Tips for taking pain medications  [facility]  Home Care Following Anesthesia  For 24 hours after surgery:  Get plenty of rest.  A responsible adult must stay with you for at least 24 hours after you leave the surgery center.  Do not drive or use heavy equipment.  If you have weakness or tingling, don't drive or use heavy equipment until this feeling goes away.   Do not drink alcohol.   Avoid strenuous or risky activities.  Ask for help when climbing stairs.  You may feel lightheaded.  IF so, sit for a few minutes before standing.  Have someone help you get up.   If you have nausea (feel sick to your stomach): Drink only clear liquids such as apple juice, ginger ale, broth or 7-Up.  Rest may also help.  Be sure to drink enough fluids.  Move to a regular diet as  "you feel able.   You may have a slight fever.  Call the doctor if your fever is over 100 F (37.7 C) (taken under the tongue) or lasts longer than 24 hours.  You may have a dry mouth, a sore throat, muscle aches or trouble sleeping. These should go away after 24 hours.  Do not make important or legal decisions.   It is recommended to avoid smoking.        Today you received an Exparel block to numb the nerves near your surgery site.  This is a block using local anesthetic or \"numbing\" medication injected around the nerves to anesthetize or \"numb\" the area supplied by those nerves.  This block is injected into the muscle layer near your surgical site.  This medication may numb the location where you had surgery up to 72 hours.  If your surgical site is an arm or leg you should be careful with your affected limb, since it is possible to injure your limb without being aware of it due to the numbing.  Until full feeling returns, you should guard against bumping or hitting your limb, and avoid extreme hot or cold temperatures on the skin.  As the block wears off, the feeling will return as a tingling or prickly sensation near your surgical site.  You will experince more discomfort from your incision as the feeling returns.  You may want to take a pain pill (a narcotic or Tylenol if this was prescribed by your surgeon) when you start to experience mild pain before the pain beomes more severe.  If your pain medications do not control your pain, you should notify your surgeon.    Tips for taking pain medications  To get the best pain relief possible, remember these points:  Take pain medications as directed, before pain becomes severe.  Pain medication can upset your stomach: taking it with food may help.  Constipation is a common side effect of pain medication. Drink plenty of  fluids.  Eat foods high in fiber. Take a stool softener if recommended by your doctor or pharmacist.  Do not drink alcohol, drive or operate " machinery while taking pain medications.  Ask about other ways to control pain, such as with heat, ice or relaxation.    Tylenol/Acetaminophen Consumption  To help encourage the safe use of acetaminophen, the makers of TYLENOL  have lowered the maximum daily dose for single-ingredient Extra Strength TYLENOL  (acetaminophen) products sold in the [place] from 8 pills per day (4,000 mg) to 6 pills per day (3,000 mg). The dosing interval has also changed from 2 pills every 4-6 hours to 2 pills every 6 hours.  If you feel your pain relief is insufficient, you may take Tylenol/Acetaminophen in addition to your narcotic pain medication.   Be careful not to exceed 3,000 mg of Tylenol/Acetaminophen in a 24 hour period from all sources.  If you are taking extra strength Tylenol/acetaminophen (500 mg), the maximum dose is 6 tablets in 24 hours.  If you are taking regular strength acetaminophen (325 mg), the maximum dose is 9 tablets in 24 hours.  You received 975 mg of Tylenol at 10:57 AM today. Next dose is available at 4:57 PM as needed per package instruction.    Call a doctor for any of the following:  Signs of infection (fever, growing tenderness at the surgery site, a large amount of drainage or bleeding, severe pain, foul-smelling drainage, redness, swelling).  It has been over 8 to 10 hours since surgery and you are still not able to urinate (pass water).  Headache for over 24 hours.  Numbness, tingling or weakness the day after surgery (if you had spinal anesthesia).  Signs of Covid-19 infection (temperature over 100 degrees, shortness of breath, cough, loss of taste/smell, generalized body aches, persistent headache, chills, sore throat, nausea/vomiting/diarrhea)  Your doctor is:  Dr. Bernadine Coon, Breast Center: [phone]                    Or dial [phone] and ask for the resident on call for:  [facility]  For emergency care, call the:  [facility]:  [phone] (TTY for hearing impaired:  [phone])

## 2023-05-28 NOTE — ANESTHESIA POSTPROCEDURE EVALUATION
"Patient: Luna Eugene    Procedure: Procedure(s):  LEFT Radiofrequency Identification Seed-Localized Segmental Mastectomy (=\"Lumpectomy\")       Anesthesia Type:  General    Note:  Disposition: Outpatient   Postop Pain Control: Uneventful            Sign Out: Well controlled pain   PONV: No   Neuro/Psych: Uneventful            Sign Out: Acceptable/Baseline neuro status   Airway/Respiratory: Uneventful            Sign Out: Acceptable/Baseline resp. status   CV/Hemodynamics: Uneventful            Sign Out: Acceptable CV status; No obvious hypovolemia; No obvious fluid overload   Other NRE: NONE   DID A NON-ROUTINE EVENT OCCUR?            Last vitals:  Vitals Value Taken Time   /77 05/25/23 1340   Temp 36.6  C (97.9  F) 05/25/23 1340   Pulse 84 05/25/23 1340   Resp 16 05/25/23 1340   SpO2 96 % 05/25/23 1340       Electronically Signed By: Dany German MD  May 27, 2023  7:03 PM  "

## 2023-06-05 NOTE — PROGRESS NOTES
FOLLOW-UP  Jun 8, 2023    Luna Eugene is a 52 year old female who returns for her 1st post-operative follow-up visit.    Treatment to date:  1. Genetic testing - negative for pathogenic variants in :JOSE, BARD1, BRCA1, BRCA2, CDH1, CHEK2, NF1, PALB2, PTEN, STK11, TP53  2. Left RFID seed-localized segmental mastectomy (5/25/2023)    HPI:    She underwent a left RFID seed-localized segmental mastectomy on 5/25/2023.  She is currently 2 week(s) post-op.  Final surgical pathology showed 3 mm of DCIS with uninvolved margins.    Since the procedure, she has been doing well. She denies any redness or swelling, or drainage from the incision, or fever/chills.  She has good range of motion and denies any upper extremity swelling.     /77 (BP Location: Right arm, Patient Position: Sitting, Cuff Size: Adult Regular)   Pulse 78   Temp 98.2  F (36.8  C) (Oral)   Resp 18   Wt 67.9 kg (149 lb 9.6 oz)   LMP 10/24/2007   SpO2 99%   BMI 24.15 kg/m     Physical Exam  Constitutional:       Appearance: She is well-developed.   Pulmonary:      Effort: No respiratory distress.   Chest:       Skin:     General: Skin is warm and dry.         INVESTIGATIONS:    Surgical Pathology (5/25/2023):  Final Diagnosis   A. LEFT breast, upper outer quadrant, RFID tag-localized segmental mastectomy:  -Ductal carcinoma in situ (DCIS), nuclear grade 2, cribriform and micropapillary types, size 3 mm, residual after prior core biopsy  -No invasive carcinoma identified  -Margins are uninvolved by DCIS  -DCIS is 1 mm from the closest (posterior) margin of this specimen (not a final margin, see specimen B)  -DCIS is > 5 mm from the remaining margins  -Other findings: duct ectasia, fibrocystic change (including microcysts) and secretory change  -Calcifications associated with benign acini  -Prior core biopsy site changes  -DCIS is estrogen receptor positive (>95%, strong intensity) and progesterone receptor positive (70%, moderate intensity) by  immunohistochemistry (performed on prior core biopsy, see report [id number])  -See comment and tumor synoptic below       B. LEFT breast, new posterior and superior margins, excision:  -Benign fibroadipose tissue  -No breast ducts or acini identified  -Negative for atypia or malignancy       ASSESSMENT:    Luna Eugene is a 52 year old female with DCIS of the LEFT breast, s/p lumpectomy    She is healing well.  I recommended she start moisturizing and massaging the scar with Vaseline.     We reviewed the pathology today and a copy of the report was provided. No further surgery is indicated.  She will follow up with Dr Jones of Medical Oncology to further discuss.  We reviewed that she will also need adjuvant radiation therapy to complete breast conservation therapy; a referral to Radiation Oncology will be made. She and her  have a trip planned to [facility] for their 30th anniversary from 6/26, returning 7/2 - I encouraged her to discuss this with the radiation oncologist regarding timing with respect to her trip.    All of the above was discussed with the patient and all questions were answered.     PLAN:  1. Radiation Oncology referral  2. Follow up with Dr Jones as scheduled  3. Follow up with me SABINA Coon MD MS [facility] FACS  Associate Professor of Surgery  Division of Surgical Oncology  [facility]

## 2023-06-07 LAB
PATH REPORT.COMMENTS IMP SPEC: ABNORMAL
PATH REPORT.COMMENTS IMP SPEC: YES
PATH REPORT.FINAL DX SPEC: ABNORMAL
PATH REPORT.GROSS SPEC: ABNORMAL
PATH REPORT.MICROSCOPIC SPEC OTHER STN: ABNORMAL
PATH REPORT.RELEVANT HX SPEC: ABNORMAL
PATHOLOGY SYNOPTIC REPORT: ABNORMAL
PHOTO IMAGE: ABNORMAL

## 2023-06-08 ENCOUNTER — PATIENT OUTREACH (OUTPATIENT)
Dept: ONCOLOGY | Facility: CLINIC | Age: 53
End: 2023-06-08

## 2023-06-08 ENCOUNTER — ONCOLOGY VISIT (OUTPATIENT)
Dept: ONCOLOGY | Facility: CLINIC | Age: 53
End: 2023-06-08
Attending: SURGERY
Payer: COMMERCIAL

## 2023-06-08 VITALS
WEIGHT: 149.6 LBS | HEART RATE: 78 BPM | SYSTOLIC BLOOD PRESSURE: 110 MMHG | DIASTOLIC BLOOD PRESSURE: 77 MMHG | BODY MASS INDEX: 24.15 KG/M2 | OXYGEN SATURATION: 99 % | RESPIRATION RATE: 18 BRPM | TEMPERATURE: 98.2 F

## 2023-06-08 DIAGNOSIS — D05.12 DUCTAL CARCINOMA IN SITU (DCIS) OF LEFT BREAST: Primary | ICD-10-CM

## 2023-06-08 PROCEDURE — G0463 HOSPITAL OUTPT CLINIC VISIT: HCPCS | Performed by: SURGERY

## 2023-06-08 PROCEDURE — 99024 POSTOP FOLLOW-UP VISIT: CPT | Performed by: SURGERY

## 2023-06-08 ASSESSMENT — PAIN SCALES - GENERAL: PAINLEVEL: MILD PAIN (2)

## 2023-06-08 NOTE — LETTER
6/8/2023         RE: Luna Eugene  [address] [place] [address]        Dear Colleague,    Thank you for referring your patient, Luna Eugene, to the [facility]. Please see a copy of my visit note below.    FOLLOW-UP  Jun 8, 2023    Luna Eugene is a 52 year old female who returns for her 1st post-operative follow-up visit.    Treatment to date:  1. Genetic testing - negative for pathogenic variants in :JOSE, BARD1, BRCA1, BRCA2, CDH1, CHEK2, NF1, PALB2, PTEN, STK11, TP53  2. Left RFID seed-localized segmental mastectomy (5/25/2023)    HPI:    She underwent a left RFID seed-localized segmental mastectomy on 5/25/2023.  She is currently 2 week(s) post-op.  Final surgical pathology showed 3 mm of DCIS with uninvolved margins.    Since the procedure, she has been doing well. She denies any redness or swelling, or drainage from the incision, or fever/chills.  She has good range of motion and denies any upper extremity swelling.     /77 (BP Location: Right arm, Patient Position: Sitting, Cuff Size: Adult Regular)   Pulse 78   Temp 98.2  F (36.8  C) (Oral)   Resp 18   Wt 67.9 kg (149 lb 9.6 oz)   LMP 10/24/2007   SpO2 99%   BMI 24.15 kg/m     Physical Exam  Constitutional:       Appearance: She is well-developed.   Pulmonary:      Effort: No respiratory distress.   Chest:       Skin:     General: Skin is warm and dry.         INVESTIGATIONS:    Surgical Pathology (5/25/2023):  Final Diagnosis   A. LEFT breast, upper outer quadrant, RFID tag-localized segmental mastectomy:  -Ductal carcinoma in situ (DCIS), nuclear grade 2, cribriform and micropapillary types, size 3 mm, residual after prior core biopsy  -No invasive carcinoma identified  -Margins are uninvolved by DCIS  -DCIS is 1 mm from the closest (posterior) margin of this specimen (not a final margin, see specimen B)  -DCIS is > 5 mm from the remaining margins  -Other findings: duct ectasia,  fibrocystic change (including microcysts) and secretory change  -Calcifications associated with benign acini  -Prior core biopsy site changes  -DCIS is estrogen receptor positive (>95%, strong intensity) and progesterone receptor positive (70%, moderate intensity) by immunohistochemistry (performed on prior core biopsy, see report [id number])  -See comment and tumor synoptic below       B. LEFT breast, new posterior and superior margins, excision:  -Benign fibroadipose tissue  -No breast ducts or acini identified  -Negative for atypia or malignancy       ASSESSMENT:    Luna Eugene is a 52 year old female with DCIS of the LEFT breast, s/p lumpectomy    She is healing well.  I recommended she start moisturizing and massaging the scar with Vaseline.     We reviewed the pathology today and a copy of the report was provided. No further surgery is indicated.  She will follow up with Dr Jones of Medical Oncology to further discuss.  We reviewed that she will also need adjuvant radiation therapy to complete breast conservation therapy; a referral to Radiation Oncology will be made. She and her  have a trip planned to [facility] for their 30th anniversary from 6/26, returning 7/2 - I encouraged her to discuss this with the radiation oncologist regarding timing with respect to her trip.    All of the above was discussed with the patient and all questions were answered.     PLAN:  Radiation Oncology referral  Follow up with Dr Jones as scheduled  Follow up with lin Coon MD MS [facility] FACS  Associate Professor of Surgery  Division of Surgical Oncology  [facility]

## 2023-06-08 NOTE — PROGRESS NOTES
New Patient Oncology Nurse Navigator Note     Referring provider: Dr. Bernadine Coon    Referring Clinic/Organization: [facility]     Referred to (specialty:) Radiation Oncology     Requested provider (if applicable): Dr. Campoverde or Dori     Date Referral Received: June 8, 2023     Evaluation for:  Breast cancer     Clinical History (per Nurse review of records provided):    Luna had bilateral screening mammogram on 4/19/23 and possible calcifications were identified in the left breast at the 1 o'clock position, posterior depth. Diagnostic imaging followed on 4/26 and spot magnification views demonstrates punctate and fine pleomorphic calcifications in the left breast 1:00 position, middle depth, measuring up to 5 mm.     5/1/23 -   LEFT BREAST, 1:00, MID-DEPTH, CALCIFICATIONS, STEREOTACTIC-GUIDED CORE BIOPSY:  -DUCTAL CARCINOMA IN SITU (DCIS), nuclear grade 3, solid and cribriform types with comedonecrosis and calcifications.  -Calcifications associated with DCIS and benign epithelium.  -DCIS is ER positive (strong, >95%) and [place] positive (moderate, 70%).  -No evidence of invasion.    5/9/23 - Contrast enhanced mammogram  Findings: Minimal bilateral background parenchymal enhancement. No suspicious enhancement in either breast.  Post biopsy changes left breast. Clip is slightly inferior to epicenter of recent biopsy.    Hereditary Cancer - BRCA1/BRCA2.  Next generation sequencing and copy number variation analysis of: BRCA1 and BRCA2.  RESULTS: NEGATIVE  Pathogenic/Likely Pathogenic Variant(s): None Detected  Variant(s) of Uncertain Significance: None Detected    5/25/23 -   A. LEFT breast, upper outer quadrant, RFID tag-localized segmental mastectomy:  -Ductal carcinoma in situ (DCIS), nuclear grade 2, cribriform and micropapillary types, size 3 mm, residual after prior core biopsy  -No invasive carcinoma identified  -Margins are uninvolved by DCIS  -DCIS is 1 mm from the closest  (posterior) margin of this specimen (not a final margin, see specimen B)  -DCIS is > 5 mm from the remaining margins  -Other findings: duct ectasia, fibrocystic change (including microcysts) and secretory change  -Calcifications associated with benign acini  -Prior core biopsy site changes  -DCIS is estrogen receptor positive (>95%, strong intensity) and progesterone receptor positive (70%, moderate  intensity) by immunohistochemistry (performed on prior core biopsy, see report [id number])  -See comment and tumor synoptic below  B. LEFT breast, new posterior and superior margins, excision:  -Benign fibroadipose tissue  -No breast ducts or acini identified  -Negative for atypia or malignancy    Records Location: See Bookmarked material     Luna and her  have a trip planned to [facility] for their 30th anniversary from 6/26, returning 7/2. Dr. Coon encouraged her to discuss this with the radiation oncologist regarding timing with respect to her trip.    6/9 10:06 - Telephoned and spoke with Luna to assist in scheduling radiation oncology consult. Writer received referral, reviewed for appropriate plan, and call transferred to New Patient Scheduling for completion.

## 2023-06-14 NOTE — PROGRESS NOTES
Department of Radiation Oncology  [place] [address]  [address] [place] [address]  Office: [phone]  Fax: [phone]    Radiation Oncology Clinic  [address] [place] [address]  Phone: [phone]  Fax: [phone]    Patient:  Luna Eugene  MRN:   [id number]  :  1970    Radiation Oncology Consult Note  Date:  Fran 15, 2023    Chief Complaint:    DCIS of the Left breast, grade 3, stage pTisNx    History of Present Illness:    Ms. Eugene is a 53yo lady with new diagnosis DCIS s/p breast conservation therapy who presents for discussion of adjuvant radiation.    She has been undergoing screening mammography for many years.     Screening mammogram with tomosynthesis (23) showed possible calcifications in the left breast at 1:00 posterior depth.    Diagnostic mammogram (23) showed punctate and fine pleomorphic calcifications in the left breast 1:00 position, middle depth, measuring up to 5mm, BI-RADS 4 category.    Stereotactic vacuum assisted core needle breast biopsy (23) was performed at the 1:00 position in the left breast. Pathology ([id number]) showed DCIS, nuclear grade 3, solid and cribriform types with comedonecrosis and calcifications, ER positive (>95%), MI positive (moderate, 70%). Path review showed negative HER by IHC, Ki-67 12%.     She established care with Dr. Coon and Dr. Jones.     Contrast enhanced mammogram (23) showed no suspicious enhancement in either breast BI-RADS 6 lesion with clip slightly inferior to epicenter of recent biopsy.    Further testing (5-10-23) for BRCA1/BRCA2 pathogenic variant was not detected. NGS was negative for pathogenic variants of JOSE, BARD1, BRCA1, BRCA2, CDH1, CHEK2, NF1, PALB2, PTEN, STK11, TP53.   FSH showed her to be in post-menopause.     Mammogram guided RFID tag was placed (23).    She underwent RFID tag localized segmental mastectomy with Dr. Coon (23).  Additional superior/posterior  "margin was removed down the the pec muscle.     Pathology ([id number]) showed DCIS, nuclear grade 3, comedo, cribriform, micropapillary and solid types, with microcalcifications, 3mm in extent is residual after prior core biopsy, no invasive carcinoma identified. DCIS is greater than 5mm from all margins. Stage was pTis Nx Mx.    She was referred to radiation oncology for adjuvant radiotherapy.     Today, Ms. Eugene reports that she is doing well overall. She denies chest pain, breast surgical site pain, or wound healing issues.    REVIEW OF SYSTEMS:    General:  No fever/chills, no weight loss, no fatigue, no anorexia  HEENT:  No sore throat, no earache, no rhinitis, no vision changes  Cardiac:  No chest pain, no edema  Pulmonary:  No shortness of breath, no cough, no hemoptysis  Skin:  No rash, no itch, no jaundice  Hematologic:  No palpable lymph nodes, no bruising or bleeding tendencies  Musculoskeletal:  No arthralgias, no myalgias  Psychiatric:  No anxiety, no depression  Breast:  No palpable masses, no nipple discharge, no dimpling      Past Medical History:    Past Medical History:   Diagnosis Date     Allergic rhinitis      Anxiety      Breast cancer (H) 05/2023     Classic migraines      Fibromyalgia      GERD (gastroesophageal reflux disease)      Insomnia      Irritable bowel syndrome      Kidney stones      Panic disorder without agoraphobia          Past Surgical History:    Past Surgical History:   Procedure Laterality Date     COLONOSCOPY  8/16/06    WNL      ESOPHAGOSCOPY, DIAGNOSTIC  5/02    decreased tone at GE junction (Dr. Billings)     LUMPECTOMY BREAST Left 5/25/2023    Procedure: LEFT Radiofrequency Identification Seed-Localized Segmental Mastectomy (=\"Lumpectomy\");  Surgeon: Bernadine Coon MD;  [place] [facility] [place]     [facility] NONSPECIFIC PROCEDURE      [place]isdon teeth removed     [facility] NONSPECIFIC PROCEDURE  1997    Lymph node removal right axilla     [facility] NONSPECIFIC PROCEDURE  1991    Colposcopy "     [facility] UGI ENDOSCOPY, SIMPLE EXAM  06    WNL         History of Radiation:  None      Reproductive History:    Menarche age 12    OCP x 10 years and Mirena x 15 years  Has not had a period since IUD      ICD: no  Crohn's disease or ulcerative colitis: no  Scleroderma: no      Pregnancy Risk: no menstruation for over 12 years since IUD placement.; recent FSH showed her to be in the post-menopausal range.         Medications:    Current Outpatient Medications   Medication Sig Dispense Refill     Cholecalciferol (VITAMIN D) 400 UNITS capsule Take 1 capsule by mouth daily.       LACTOBACILLUS PO        LORazepam (ATIVAN) 0.5 MG tablet Take 1 tablet (0.5 mg) by mouth 2 times daily as needed (Panic attack) 30 tablet 0     MULTI-VITAMIN OR TABS Take 1 tablet by mouth daily  0     tretinoin (RETIN-A) 0.05 % external cream Apply topically At Bedtime       valACYclovir (VALTREX) 1000 mg tablet Take 2 tablets (2,000 mg) by mouth 2 times daily 4 tablet 11         Allergies:    Allergies   Allergen Reactions     No Known Allergies          Family History:    Family History   Problem Relation Age of Onset     Cancer Mother 67        leukemia      C.A.D. Maternal Grandmother 80     Cerebrovascular Disease Maternal Grandmother      Neurologic Disorder Maternal Grandmother         parkinson's     Gastrointestinal Disease Maternal Grandmother      Osteoporosis Maternal Grandmother      Eye Disorder Maternal Grandfather         cataracts     C.A.D. 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 [place]      ECOG Performance Status: 0    PHYSICAL EXAMINATION:    /73   Pulse 71   Wt 68 kg (150 lb)   LMP 10/24/2007   SpO2 99%   BMI 24.21 kg/m    General:  Well-developed, well-appearing, NAD  HEENT:  NCAT, anicteric sclera, moist oral mucosa  Cardiac:  Strong peripheral pulses, no JVD, no peripheral edema  Pulmonary:  Breathing comfortably on room air, no stridor, no audible wheeze  Skin:  Pink, warm, no rash  Lymph Nodes:  No palpable lymphadenopathy  MSK:  normal muscular bulk and tone, no tender bones or joints  Psychiatric:  normal affect, normal attention  Breast:  deferred      DATA REVIEWED:  IMAGING:  Mammogram (4-19-23)        IMPRESSION:  Ms. Eugene is a 51yo lady with DCIS, grade 3, pTisNx who is s/p lumpectomy and presents now for discussion of adjuvant radiation.    RECOMMENDATIONS:  We reviewed her imaging and pathology. Her initial biopsy revealed grade 3 DCIS. The definitive surgery with lumpectomy had minimal residual disease, which was grade 2. Overall, her DCIS was grade 3 due to the larger specimen from biopsy. This was per discussion with Dr. Yenny Cartwright.     I explained the rationale for adjuvant radiation therapy for DCIS, which is to prevent an invasive recurrence. I also discussed the risk factors predicting ipsilateral breast recurrence. She has a relatively small DCIS, but high grade is associated with an increased risk of local recurrence.     We thus recommend adjuvant radiation to the left breast with photons to a dose of 4240 cGy in 16 fractions with a boost to 1000 cGy in 4 fractions via 3D conformal radiotherapy technique. This will be followed by a boost of 1000 cGy in 4 fractions to the lumpectomy cavity.     We also discussed the risk of acute and late effects of radiation. The short term risks include but are not limited to radiation dermatitis, breast pain, nausea,  vomiting and fatigue. Late effects include fibrosis, breast contracture, rib fracture, pulmonary fibrosis and secondary malignancy.  Ms. Eugene understands the risks and would like to proceed. She signed consents today.    She was originally planning to go to [facility] for vacation 6/26/-7/2. She has now decided to postpone her trip. She will thus be brought back for simulation next week and start her treatment per department routine.     Thank you for allowing us to participate in this patient's care.  Please feel free to call with any questions or concerns.    The patient was seen by and discussed with attending physician Dr. Campoverde who agrees with the above history, physical exam, assessment and plan.      Mallorie Farley MD PGY-3  Radiation Oncology, [facility], [place]  [phone] clinic    I saw and examined the patient with the resident.  I have reviewed and edited the resident's note and agree with the plan of care.      I reviewed patient's chart, internal/external medical records, imaging studies (including actual images), labs and pathology reports.  I interviewed and counseled the patient face to face.            Cleo Campoverde MD

## 2023-06-14 NOTE — TELEPHONE ENCOUNTER
Action June 14, 2023 1:51 PM CB   Action Taken Internal Referral.  Per call to PT, confirmed no previous radiation or outside records needed

## 2023-06-15 ENCOUNTER — PRE VISIT (OUTPATIENT)
Dept: RADIATION ONCOLOGY | Facility: CLINIC | Age: 53
End: 2023-06-15
Payer: COMMERCIAL

## 2023-06-15 ENCOUNTER — OFFICE VISIT (OUTPATIENT)
Dept: RADIATION ONCOLOGY | Facility: CLINIC | Age: 53
End: 2023-06-15
Attending: SURGERY
Payer: COMMERCIAL

## 2023-06-15 VITALS
OXYGEN SATURATION: 99 % | SYSTOLIC BLOOD PRESSURE: 116 MMHG | DIASTOLIC BLOOD PRESSURE: 73 MMHG | BODY MASS INDEX: 24.21 KG/M2 | HEART RATE: 71 BPM | WEIGHT: 150 LBS

## 2023-06-15 DIAGNOSIS — D05.12 DUCTAL CARCINOMA IN SITU (DCIS) OF LEFT BREAST: ICD-10-CM

## 2023-06-15 PROCEDURE — G0463 HOSPITAL OUTPT CLINIC VISIT: HCPCS | Performed by: RADIOLOGY

## 2023-06-15 PROCEDURE — 99214 OFFICE O/P EST MOD 30 MIN: CPT | Performed by: RADIOLOGY

## 2023-06-15 PROCEDURE — 99204 OFFICE O/P NEW MOD 45 MIN: CPT | Mod: GC | Performed by: RADIOLOGY

## 2023-06-15 ASSESSMENT — ENCOUNTER SYMPTOMS
CONSTIPATION: 0
DIARRHEA: 0
BRUISES/BLEEDS EASILY: 0
FREQUENCY: 0
DIAPHORESIS: 0
HEMATURIA: 0
SEIZURES: 0
DIZZINESS: 0
HEADACHES: 0
CHILLS: 0
DEPRESSION: 0
NAUSEA: 0
COUGH: 0
BACK PAIN: 0
INSOMNIA: 1
TINGLING: 0
FALLS: 0
BLOOD IN STOOL: 0
SORE THROAT: 0
SHORTNESS OF BREATH: 0
DOUBLE VISION: 0
NECK PAIN: 0
WEIGHT LOSS: 0
EYE PAIN: 0
NERVOUS/ANXIOUS: 1
FEVER: 0
BLURRED VISION: 0
VOMITING: 0

## 2023-06-15 NOTE — PROGRESS NOTES
HPI    INITIAL PATIENT ASSESSMENT    Diagnosis: Breast cancer, Lt lumpectomy 5/25/23    Prior radiation therapy: None    Prior chemotherapy: None    Prior hormonal therapy:No    Pain Eval:  Denies    Psychosocial  Living arrangements:   Fall Risk: independent   referral needs: Not needed    Advanced Directive: No  Implantable Cardiac Device? No    Onset of menarche: @ age 12  LMP: Patient's last menstrual period was 10/24/2007.  Onset of menopause: Recent menopause labs were high but no period in years  Abnormal vaginal bleeding/discharge: No  Are you pregnant? No  Reproductive note: 2 childre    Nurse face-to-face time: Level 4:  15 min face to face time    Review of Systems   Constitutional: Negative for chills, diaphoresis, fever, malaise/fatigue and weight loss.   HENT: Negative for ear pain, nosebleeds and sore throat.    Eyes: Negative for blurred vision, double vision and pain.   Respiratory: Negative for cough and shortness of breath.    Cardiovascular: Negative for chest pain and leg swelling.   Gastrointestinal: Negative for blood in stool, constipation, diarrhea, nausea and vomiting.   Genitourinary: Negative for frequency, hematuria and urgency.   Musculoskeletal: Negative for back pain, falls, joint pain and neck pain.   Skin: Negative for rash.   Neurological: Negative for dizziness, tingling, seizures and headaches.   Endo/Heme/Allergies: Does not bruise/bleed easily.   Psychiatric/Behavioral: Negative for depression. The patient is nervous/anxious (Hx of anxiety, has increased with dx. ) and has insomnia (waking up @3 wide awake, will try some melatonin. ).

## 2023-06-15 NOTE — LETTER
6/15/2023         RE: Luna Eugene  [address]  [place] [address]        Dear Colleague,    Thank you for referring your patient, Luna Eugene, to the [facility] RADIATION ONCOLOGY. Please see a copy of my visit note below.    Department of Radiation Oncology  [place] Mail [address]  [address] [place] [address]  Office: [phone]  Fax: [phone]    Radiation Oncology Clinic  [address] [place] [address]  Phone: [phone]  Fax: [phone]    Patient:  Luna Eugene  MRN:   [id number]  :  1970    Radiation Oncology Consult Note  Date:  Fran 15, 2023    Chief Complaint:    DCIS of the Left breast, grade 3, stage pTisNx    History of Present Illness:    Ms. Eugene is a 53yo lady with new diagnosis DCIS s/p breast conservation therapy who presents for discussion of adjuvant radiation.    She has been undergoing screening mammography for many years.     Screening mammogram with tomosynthesis (23) showed possible calcifications in the left breast at 1:00 posterior depth.    Diagnostic mammogram (23) showed punctate and fine pleomorphic calcifications in the left breast 1:00 position, middle depth, measuring up to 5mm, BI-RADS 4 category.    Stereotactic vacuum assisted core needle breast biopsy (23) was performed at the 1:00 position in the left breast. Pathology ([id number]) showed DCIS, nuclear grade 3, solid and cribriform types with comedonecrosis and calcifications, ER positive (>95%), UT positive (moderate, 70%). Path review showed negative HER by IHC, Ki-67 12%.     She established care with Dr. Coon and Dr. Jones.     Contrast enhanced mammogram (23) showed no suspicious enhancement in either breast BI-RADS 6 lesion with clip slightly inferior to epicenter of recent biopsy.    Further testing (5-10-23) for BRCA1/BRCA2 pathogenic variant was not detected. NGS was negative for pathogenic variants of JOSE, BARD1,  BRCA1, BRCA2, CDH1, CHEK2, NF1, PALB2, PTEN, STK11, TP53.   FSH showed her to be in post-menopause.     Mammogram guided RFID tag was placed (5-23-23).    She underwent RFID tag localized segmental mastectomy with Dr. Coon (5-25-23).  Additional superior/posterior margin was removed down the the pec muscle.     Pathology ([id number]) showed DCIS, nuclear grade 3, comedo, cribriform, micropapillary and solid types, with microcalcifications, 3mm in extent is residual after prior core biopsy, no invasive carcinoma identified. DCIS is greater than 5mm from all margins. Stage was pTis Nx Mx.    She was referred to radiation oncology for adjuvant radiotherapy.     Today, Ms. Eugene reports that she is doing well overall. She denies chest pain, breast surgical site pain, or wound healing issues.    REVIEW OF SYSTEMS:    General:  No fever/chills, no weight loss, no fatigue, no anorexia  HEENT:  No sore throat, no earache, no rhinitis, no vision changes  Cardiac:  No chest pain, no edema  Pulmonary:  No shortness of breath, no cough, no hemoptysis  Skin:  No rash, no itch, no jaundice  Hematologic:  No palpable lymph nodes, no bruising or bleeding tendencies  Musculoskeletal:  No arthralgias, no myalgias  Psychiatric:  No anxiety, no depression  Breast:  No palpable masses, no nipple discharge, no dimpling      Past Medical History:    Past Medical History:   Diagnosis Date    Allergic rhinitis     Anxiety     Breast cancer (H) 05/2023    Classic migraines     Fibromyalgia     GERD (gastroesophageal reflux disease)     Insomnia     Irritable bowel syndrome     Kidney stones     Panic disorder without agoraphobia          Past Surgical History:    Past Surgical History:   Procedure Laterality Date    COLONOSCOPY  8/16/06    WNL     ESOPHAGOSCOPY, DIAGNOSTIC  5/02    decreased tone at GE junction (Dr. Billings)    LUMPECTOMY BREAST Left 5/25/2023    Procedure: LEFT Radiofrequency Identification Seed-Localized Segmental Mastectomy  "(=\"Lumpectomy\");  Surgeon: Bernadine Coon MD;  Location: [facility] [place]    [facility] NONSPECIFIC PROCEDURE      [place]isdon teeth removed    [facility] NONSPECIFIC PROCEDURE      Lymph node removal right axilla    [facility] NONSPECIFIC PROCEDURE      Colposcopy    [facility]  06    WNL         History of Radiation:  None      Reproductive History:    Menarche age 12    OCP x 10 years and Mirena x 15 years  Has not had a period since IUD      ICD: no  Crohn's disease or ulcerative colitis: no  Scleroderma: no      Pregnancy Risk: no menstruation for over 12 years since IUD placement.; recent FSH showed her to be in the post-menopausal range.         Medications:    Current Outpatient Medications   Medication Sig Dispense Refill    Cholecalciferol (VITAMIN D) 400 UNITS capsule Take 1 capsule by mouth daily.      LACTOBACILLUS PO       LORazepam (ATIVAN) 0.5 MG tablet Take 1 tablet (0.5 mg) by mouth 2 times daily as needed (Panic attack) 30 tablet 0    MULTI-VITAMIN OR TABS Take 1 tablet by mouth daily  0    tretinoin (RETIN-A) 0.05 % external cream Apply topically At Bedtime      valACYclovir (VALTREX) 1000 mg tablet Take 2 tablets (2,000 mg) by mouth 2 times daily 4 tablet 11         Allergies:    Allergies   Allergen Reactions    No Known Allergies          Family History:    Family History   Problem Relation Age of Onset    Cancer Mother 67        leukemia     C.A.D. Maternal Grandmother 80    Cerebrovascular Disease Maternal Grandmother     Neurologic Disorder Maternal Grandmother         parkinson's    Gastrointestinal Disease Maternal Grandmother     Osteoporosis Maternal Grandmother     Eye Disorder Maternal Grandfather         cataracts    C.A.D. 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[place]      ECOG Performance Status: 0    PHYSICAL EXAMINATION:    /73   Pulse 71   Wt 68 kg (150 lb)   LMP 10/24/2007   SpO2 99%   BMI 24.21 kg/m    General:  Well-developed, well-appearing, NAD  HEENT:  NCAT, anicteric sclera, moist oral mucosa  Cardiac:  Strong peripheral pulses, no JVD, no peripheral edema  Pulmonary:  Breathing comfortably on room air, no stridor, no audible wheeze  Skin:  Pink, warm, no rash  Lymph Nodes:  No palpable lymphadenopathy  MSK:  normal muscular bulk and tone, no tender bones or joints  Psychiatric:  normal affect, normal attention  Breast:  deferred      DATA REVIEWED:  IMAGING:  Mammogram (4-19-23)        IMPRESSION:  Ms. Eugene is a 53yo lady with DCIS, grade 3, pTisNx who is s/p lumpectomy and presents now for discussion of adjuvant radiation.    RECOMMENDATIONS:  We reviewed her imaging and pathology. Her initial biopsy revealed grade 3 DCIS. The definitive surgery with lumpectomy had minimal residual disease, which was grade 2. Overall, her DCIS was grade 3 due to the larger specimen from biopsy. This was per discussion with Dr. Yenny Cartwright.     I explained the rationale for adjuvant radiation therapy for DCIS, which is to prevent an invasive recurrence. I also discussed the risk factors predicting ipsilateral breast recurrence. She has a relatively small DCIS, but high grade is associated with an increased risk of local recurrence.     We thus recommend adjuvant radiation to the left breast with photons to a dose of 4240 cGy in 16 fractions with a boost to 1000 cGy in 4 fractions via 3D conformal radiotherapy technique. This will be followed by a boost of  1000 cGy in 4 fractions to the lumpectomy cavity.     We also discussed the risk of acute and late effects of radiation. The short term risks include but are not limited to radiation dermatitis, breast pain, nausea, vomiting and fatigue. Late effects include fibrosis, breast contracture, rib fracture, pulmonary fibrosis and secondary malignancy.  Ms. Eugene understands the risks and would like to proceed. She signed consents today.    She was originally planning to go to [facility] for vacation 6/26/-7/2. She has now decided to postpone her trip. She will thus be brought back for simulation next week and start her treatment per department routine.     Thank you for allowing us to participate in this patient's care.  Please feel free to call with any questions or concerns.    The patient was seen by and discussed with attending physician Dr. Campoverde who agrees with the above history, physical exam, assessment and plan.      Mallorie Farley MD PGY-3  Radiation Oncology, [facility]  [phone] clinic    I saw and examined the patient with the resident.  I have reviewed and edited the resident's note and agree with the plan of care.      I reviewed patient's chart, internal/external medical records, imaging studies (including actual images), labs and pathology reports.  I interviewed and counseled the patient face to face.            Cleo Campoverde MD

## 2023-06-18 NOTE — PROGRESS NOTES
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  She underwent a partial mastectomy which demonstrated pTis disease.  She presents today for follow-up.      Interval History  5/10/2023 Germline testing for BRCA1/BRCA2 pathogenic variant was not detected. NGS was negative for pathogenic variants of JOSE, BARD1, BRCA1, BRCA2, CDH1, CHEK2, NF1, PALB2, PTEN, STK11, TP53.   5/25/2023  RFID tag localized segmental mastectomy. Pathology showed 3mm of grade II DCIS. No invasive disease. Negative margins. pTis Nx Mx. Biomarkers not repeated      She has established care with radiation oncology and plans to start treatment within the next few weeks.  She has recovered well from surgery and has no residual symptoms or issues.  She intermittently has sharp shooting pains in her breast, but these have not been significantly bothersome.      At baseline, she does have significant hot flashes, mainly at nighttime.  These have been ongoing for a few years at this point.    She has otherwise been feeling well without any headaches, visual changes, cough, SOB, chest pain, n/v, constipation/diarrhea, abdominal pain, focal weakness or numbness.         Her full oncologic history is as follows:   Oncologic History  Patient Active Problem List    Diagnosis Date Noted     Ductal carcinoma in situ (DCIS) of left breast 05/16/2023     Priority: Medium     Left breast DCIS  4/12/2022 last screening mammogram normal  4/19/2023 screening mammogram possible calcifications in the left breast at 1:00, posterior depth.  Left breast diagnostic mammo showed punctate and fine pleomorphic calcifications in the left breast at 1:00, middle depth measuring up to 5 mm.   5/1/2023 stereotactic guided core biopsy showing grade  3 DCIS with comedonecrosis and calcifications.  ER(3+,> 95%), KS(2+, 70%)  5/8/2023   5/10/2023 Germline testing for BRCA1/BRCA2 pathogenic variant was not detected. NGS was negative for pathogenic variants of JOSE, BARD1, BRCA1, BRCA2, CDH1, CHEK2, NF1, PALB2, PTEN, STK11, TP53.   5/25/2023  RFID tag localized segmental mastectomy. Pathology showed 3mm of grade II DCIS. No invasive disease. Negative margins. pTis Nx Mx. Biomarkers not repeated          Herpes simplex labialis 09/23/2013     Priority: Medium     Allergic Rhinitis      Priority: Medium     Irritable bowel syndrome      Priority: Medium     Fibromyalgia      Priority: Medium     Classical migraine      Priority: Medium     Panic disorder without agoraphobia      Priority: Medium     Insomnia      Priority: Medium          Current Medications:  Current Outpatient Medications   Medication Sig Dispense Refill     Cholecalciferol (VITAMIN D) 400 UNITS capsule Take 1 capsule by mouth daily.       LACTOBACILLUS PO        LORazepam (ATIVAN) 0.5 MG tablet Take 1 tablet (0.5 mg) by mouth 2 times daily as needed (Panic attack) 30 tablet 0     MULTI-VITAMIN OR TABS Take 1 tablet by mouth daily  0     tretinoin (RETIN-A) 0.05 % external cream Apply topically At Bedtime       valACYclovir (VALTREX) 1000 mg tablet Take 2 tablets (2,000 mg) by mouth 2 times daily 4 tablet 11         ECOG Performance Status: 0    Physical Examination:  /69   Pulse 64   Temp 98.2  F (36.8  C) (Oral)   Resp 16   Wt 68.1 kg (150 lb 1.6 oz)   LMP 10/24/2007   SpO2 100%   BMI 24.23 kg/m    General:  Well appearing, well-nourished adult female in NAD.  HEENT:  Normocephalic.  Sclera anicteric.  MMM.  No lesions of the oropharynx.  Breast: Status post left breast lumpectomy.  Healing well.  No palpable lymph nodes.  Lymph:  No cervical, supraclavicular, or axillary LAD.  Chest:  CTA bilaterally.  No wheezes or crackles.  CV:  RRR.  No murmurs or gallops  Abd:  Soft/NT/ND.   BS normoactive.  No hepatosplenomegaly.  Ext:  No pitting edema of the bilateral lower extremities.  Pulses 2+ and symmetric.  Musculo:  Strength 5/5 throughout.  Neuro:  Cranial nerves grossly intact.  Psych:  Mood and affect appear normal.      Laboratory Data:  Lab Results   Component Value Date    WBC 6.0 04/03/2008    HGB 12.5 04/03/2008    HCT 39.6 04/03/2008    MCV 92 04/03/2008     04/03/2008     No results found for: NA, HCO3, CREATININE, BUN, ANIONGAP, GLUCOSE  No results found for: ALT, AST, GGT, ALKPHOS, BILITOT  No results found for: INR, PT      Radiology data:  I have personally reviewed the images of / or the following radiology data: As detailed in the oncology timeline    Pathology and other data:  I have personally reviewed the following data: As detailed in the oncology timeline      Assessment and Recommendations  Luna Eugene  is a 52 year old postmenopausal woman with 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  She underwent a partial mastectomy which demonstrated pTis disease.  She presents today for follow-up.    #Left breast DCIS  Fortunately her disease was very small.  Reviewed data from NSABP B24 and ROSENBERG-01 with her today.  She will be receiving adjuvant radiation therapy.  Since her disease was so small, discussed that adjuvant endocrine therapy can be considered, however I would have a very low threshold to stop it if she is not tolerating the medications.  Since ROSENBERG-01 study was demonstrated comparable toxicity of endocrine therapy compared to placebo, my preference would be for her to start with 5 mg of tamoxifen with plans to continue this for 3 years if tolerated well.  We reviewed toxicities associated with tamoxifen including but not limited to hot flashes, night sweats, mood instability, very low risk of VTE and endometrial cancer.  We reviewed that the risk of VTE and endometrial cancers was not statistically different between low-dose tamoxifen and placebo in the ROSENBERG  01 trial.    She will need follow-up diagnostic mammogram 6 months after end of her radiation therapy.  She can have alternating visits every 6 months with me and APPs.           30 minutes spent on the date of the encounter doing chart review, review of test results, interpretation of tests, patient visit and documentation       Dame Karen MD   of Medicine  Division of Hematology, Oncology and Transplantation  [facility]

## 2023-06-20 ENCOUNTER — ONCOLOGY VISIT (OUTPATIENT)
Dept: ONCOLOGY | Facility: CLINIC | Age: 53
End: 2023-06-20
Attending: INTERNAL MEDICINE
Payer: COMMERCIAL

## 2023-06-20 VITALS
BODY MASS INDEX: 24.23 KG/M2 | SYSTOLIC BLOOD PRESSURE: 104 MMHG | WEIGHT: 150.1 LBS | OXYGEN SATURATION: 100 % | TEMPERATURE: 98.2 F | RESPIRATION RATE: 16 BRPM | DIASTOLIC BLOOD PRESSURE: 69 MMHG | HEART RATE: 64 BPM

## 2023-06-20 DIAGNOSIS — D05.12 DUCTAL CARCINOMA IN SITU (DCIS) OF LEFT BREAST: Primary | ICD-10-CM

## 2023-06-20 DIAGNOSIS — N95.1 MENOPAUSAL SYNDROME (HOT FLASHES): ICD-10-CM

## 2023-06-20 PROBLEM — C50.912 MALIGNANT NEOPLASM OF LEFT BREAST IN FEMALE, ESTROGEN RECEPTOR POSITIVE, UNSPECIFIED SITE OF BREAST (H): Status: RESOLVED | Noted: 2023-05-08 | Resolved: 2023-06-20

## 2023-06-20 PROBLEM — Z17.0 MALIGNANT NEOPLASM OF LEFT BREAST IN FEMALE, ESTROGEN RECEPTOR POSITIVE, UNSPECIFIED SITE OF BREAST (H): Status: RESOLVED | Noted: 2023-05-08 | Resolved: 2023-06-20

## 2023-06-20 PROCEDURE — 99214 OFFICE O/P EST MOD 30 MIN: CPT | Performed by: INTERNAL MEDICINE

## 2023-06-20 PROCEDURE — 99212 OFFICE O/P EST SF 10 MIN: CPT | Performed by: INTERNAL MEDICINE

## 2023-06-20 RX ORDER — TAMOXIFEN CITRATE 10 MG/1
5 TABLET ORAL DAILY
Qty: 60 TABLET | Refills: 3 | Status: SHIPPED | OUTPATIENT
Start: 2023-06-20 | End: 2024-06-10

## 2023-06-20 ASSESSMENT — PAIN SCALES - GENERAL: PAINLEVEL: NO PAIN (0)

## 2023-06-20 NOTE — NURSING NOTE
"Oncology Rooming Note    June 20, 2023 1:14 PM   Luna Eugene is a 52 year old female who presents for:    Chief Complaint   Patient presents with     Oncology Clinic Visit     Breast ca     Initial Vitals: /69   Pulse 64   Temp 98.2  F (36.8  C) (Oral)   Resp 16   Wt 68.1 kg (150 lb 1.6 oz)   LMP 10/24/2007   SpO2 100%   BMI 24.23 kg/m   Estimated body mass index is 24.23 kg/m  as calculated from the following:    Height as of 5/25/23: 1.676 m (5' 6\").    Weight as of this encounter: 68.1 kg (150 lb 1.6 oz). Body surface area is 1.78 meters squared.  No Pain (0) Comment: Data Unavailable   Patient's last menstrual period was 10/24/2007.  Allergies reviewed: Yes  Medications reviewed: Yes    Medications: Medication refills not needed today.  Pharmacy name entered into EPIC:    [id number] - [place], [place] - [address]  [id number] - [place], [place] - [address] [id number] - [place], [place] - [address]  [facility] [place], [place] - [phone]    Clinical concerns:  none      Yojana Estrada            "

## 2023-06-20 NOTE — LETTER
6/20/2023         RE: Luna Eugene  [address]  [place] [address]        Dear Colleague,    Thank you for referring your patient, Luna Eugene, to the [facility] CANCER [facility]. Please see a copy of my visit note below.    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  She underwent a partial mastectomy which demonstrated pTis disease.  She presents today for follow-up.      Interval History  5/10/2023 Germline testing for BRCA1/BRCA2 pathogenic variant was not detected. NGS was negative for pathogenic variants of JOSE, BARD1, BRCA1, BRCA2, CDH1, CHEK2, NF1, PALB2, PTEN, STK11, TP53.   5/25/2023  RFID tag localized segmental mastectomy. Pathology showed 3mm of grade II DCIS. No invasive disease. Negative margins. pTis Nx Mx. Biomarkers not repeated      She has established care with radiation oncology and plans to start treatment within the next few weeks.  She has recovered well from surgery and has no residual symptoms or issues.  She intermittently has sharp shooting pains in her breast, but these have not been significantly bothersome.      At baseline, she does have significant hot flashes, mainly at nighttime.  These have been ongoing for a few years at this point.    She has otherwise been feeling well without any headaches, visual changes, cough, SOB, chest pain, n/v, constipation/diarrhea, abdominal pain, focal weakness or numbness.         Her full oncologic history is as follows:   Oncologic History  Patient Active Problem List    Diagnosis Date Noted    Ductal carcinoma in situ (DCIS) of left breast 05/16/2023     Priority: Medium     Left breast DCIS  4/12/2022 last screening mammogram normal  4/19/2023 screening mammogram possible  calcifications in the left breast at 1:00, posterior depth.  Left breast diagnostic mammo showed punctate and fine pleomorphic calcifications in the left breast at 1:00, middle depth measuring up to 5 mm.   5/1/2023 stereotactic guided core biopsy showing grade 3 DCIS with comedonecrosis and calcifications.  ER(3+,> 95%), PA(2+, 70%)  5/8/2023   5/10/2023 Germline testing for BRCA1/BRCA2 pathogenic variant was not detected. NGS was negative for pathogenic variants of JOSE, BARD1, BRCA1, BRCA2, CDH1, CHEK2, NF1, PALB2, PTEN, STK11, TP53.   5/25/2023  RFID tag localized segmental mastectomy. Pathology showed 3mm of grade II DCIS. No invasive disease. Negative margins. pTis Nx Mx. Biomarkers not repeated         Herpes simplex labialis 09/23/2013     Priority: Medium    Allergic Rhinitis      Priority: Medium    Irritable bowel syndrome      Priority: Medium    Fibromyalgia      Priority: Medium    Classical migraine      Priority: Medium    Panic disorder without agoraphobia      Priority: Medium    Insomnia      Priority: Medium          Current Medications:  Current Outpatient Medications   Medication Sig Dispense Refill    Cholecalciferol (VITAMIN D) 400 UNITS capsule Take 1 capsule by mouth daily.      LACTOBACILLUS PO       LORazepam (ATIVAN) 0.5 MG tablet Take 1 tablet (0.5 mg) by mouth 2 times daily as needed (Panic attack) 30 tablet 0    MULTI-VITAMIN OR TABS Take 1 tablet by mouth daily  0    tretinoin (RETIN-A) 0.05 % external cream Apply topically At Bedtime      valACYclovir (VALTREX) 1000 mg tablet Take 2 tablets (2,000 mg) by mouth 2 times daily 4 tablet 11         ECOG Performance Status: 0    Physical Examination:  /69   Pulse 64   Temp 98.2  F (36.8  C) (Oral)   Resp 16   Wt 68.1 kg (150 lb 1.6 oz)   LMP 10/24/2007   SpO2 100%   BMI 24.23 kg/m    General:  Well appearing, well-nourished adult female in NAD.  HEENT:  Normocephalic.  Sclera anicteric.  MMM.  No lesions of the  oropharynx.  Breast: Status post left breast lumpectomy.  Healing well.  No palpable lymph nodes.  Lymph:  No cervical, supraclavicular, or axillary LAD.  Chest:  CTA bilaterally.  No wheezes or crackles.  CV:  RRR.  No murmurs or gallops  Abd:  Soft/NT/ND.  BS normoactive.  No hepatosplenomegaly.  Ext:  No pitting edema of the bilateral lower extremities.  Pulses 2+ and symmetric.  Musculo:  Strength 5/5 throughout.  Neuro:  Cranial nerves grossly intact.  Psych:  Mood and affect appear normal.      Laboratory Data:  Lab Results   Component Value Date    WBC 6.0 04/03/2008    HGB 12.5 04/03/2008    HCT 39.6 04/03/2008    MCV 92 04/03/2008     04/03/2008     No results found for: NA, HCO3, CREATININE, BUN, ANIONGAP, GLUCOSE  No results found for: ALT, AST, GGT, ALKPHOS, BILITOT  No results found for: INR, PT      Radiology data:  I have personally reviewed the images of / or the following radiology data: As detailed in the oncology timeline    Pathology and other data:  I have personally reviewed the following data: As detailed in the oncology timeline      Assessment and Recommendations  Luna Eugene  is a 52 year old postmenopausal woman with 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  She underwent a partial mastectomy which demonstrated pTis disease.  She presents today for follow-up.    #Left breast DCIS  Fortunately her disease was very small.  Reviewed data from NSABP B24 and ROSENBERG-01 with her today.  She will be receiving adjuvant radiation therapy.  Since her disease was so small, discussed that adjuvant endocrine therapy can be considered, however I would have a very low threshold to stop it if she is not tolerating the medications.  Since ROSENBERG-01 study was demonstrated comparable toxicity of endocrine therapy compared to placebo, my preference would be for her to start with 5 mg of tamoxifen with plans to continue this for 3 years if tolerated well.  We reviewed toxicities associated with tamoxifen  including but not limited to hot flashes, night sweats, mood instability, very low risk of VTE and endometrial cancer.  We reviewed that the risk of VTE and endometrial cancers was not statistically different between low-dose tamoxifen and placebo in the ROSENBERG 01 trial.    She will need follow-up diagnostic mammogram 6 months after end of her radiation therapy.  She can have alternating visits every 6 months with me and APPs.           30 minutes spent on the date of the encounter doing chart review, review of test results, interpretation of tests, patient visit and documentation       Dame Karen MD   of Medicine  Division of Hematology, Oncology and Transplantation  [facility]

## 2023-06-22 ENCOUNTER — APPOINTMENT (OUTPATIENT)
Dept: RADIATION ONCOLOGY | Facility: CLINIC | Age: 53
End: 2023-06-22
Attending: RADIOLOGY
Payer: COMMERCIAL

## 2023-06-22 PROCEDURE — 77290 THER RAD SIMULAJ FIELD CPLX: CPT | Performed by: RADIOLOGY

## 2023-06-22 PROCEDURE — 77290 THER RAD SIMULAJ FIELD CPLX: CPT | Mod: 26 | Performed by: RADIOLOGY

## 2023-06-22 PROCEDURE — 77334 RADIATION TREATMENT AID(S): CPT | Performed by: RADIOLOGY

## 2023-06-22 PROCEDURE — 77334 RADIATION TREATMENT AID(S): CPT | Mod: 26 | Performed by: RADIOLOGY

## 2023-07-03 ENCOUNTER — APPOINTMENT (OUTPATIENT)
Dept: RADIATION ONCOLOGY | Facility: CLINIC | Age: 53
End: 2023-07-03
Attending: RADIOLOGY
Payer: COMMERCIAL

## 2023-07-03 PROCEDURE — 77280 THER RAD SIMULAJ FIELD SMPL: CPT | Performed by: RADIOLOGY

## 2023-07-03 PROCEDURE — 77280 THER RAD SIMULAJ FIELD SMPL: CPT | Mod: 26 | Performed by: RADIOLOGY

## 2023-07-05 ENCOUNTER — ALLIED HEALTH/NURSE VISIT (OUTPATIENT)
Dept: RADIATION ONCOLOGY | Facility: CLINIC | Age: 53
End: 2023-07-05
Attending: RADIOLOGY
Payer: COMMERCIAL

## 2023-07-05 PROCEDURE — G6002 STEREOSCOPIC X-RAY GUIDANCE: HCPCS | Mod: 26 | Performed by: RADIOLOGY

## 2023-07-05 PROCEDURE — 77412 RADIATION TX DELIVERY LVL 3: CPT | Performed by: RADIOLOGY

## 2023-07-05 PROCEDURE — 77387 GUIDANCE FOR RADJ TX DLVR: CPT | Performed by: RADIOLOGY

## 2023-07-06 ENCOUNTER — APPOINTMENT (OUTPATIENT)
Dept: RADIATION ONCOLOGY | Facility: CLINIC | Age: 53
End: 2023-07-06
Attending: RADIOLOGY
Payer: COMMERCIAL

## 2023-07-06 PROCEDURE — 77412 RADIATION TX DELIVERY LVL 3: CPT | Performed by: RADIOLOGY

## 2023-07-06 PROCEDURE — G6002 STEREOSCOPIC X-RAY GUIDANCE: HCPCS | Mod: 26 | Performed by: RADIOLOGY

## 2023-07-06 PROCEDURE — 77387 GUIDANCE FOR RADJ TX DLVR: CPT | Performed by: RADIOLOGY

## 2023-07-07 ENCOUNTER — APPOINTMENT (OUTPATIENT)
Dept: RADIATION ONCOLOGY | Facility: CLINIC | Age: 53
End: 2023-07-07
Attending: RADIOLOGY
Payer: COMMERCIAL

## 2023-07-07 PROCEDURE — 77387 GUIDANCE FOR RADJ TX DLVR: CPT | Performed by: RADIOLOGY

## 2023-07-07 PROCEDURE — G6002 STEREOSCOPIC X-RAY GUIDANCE: HCPCS | Mod: 26 | Performed by: RADIOLOGY

## 2023-07-07 PROCEDURE — 77412 RADIATION TX DELIVERY LVL 3: CPT | Performed by: RADIOLOGY

## 2023-07-09 NOTE — PROGRESS NOTES
[facility] PHYSICIANS  SPECIALIZING IN BREAKTHROUGHS  Radiation Oncology    On Treatment Visit Note      Luna Eugene      Date: 7/10/2023   MRN: [id number]   : 1970  Diagnosis: Breast cancer      Reason for Visit:  On Radiation Treatment Visit     Treatment Summary to Date  Site:    Treatment Site: Lt breast Current Dose:    Current Dose: 795/5240 cGy Fractions:    Fractions: 3/20      Chemotherapy  Chemo concurrent with radx?: No    ED Visit/Hospital Admission: None    Treatment Breaks:   : planned break  . 7/10/2023: machine malfunction      Subjective: Patient feels well, reporting no side effects at this time.  Denies fatigue or skin changes.  Maintaining good appetite, no nausea.    Nursing ROS:   Nutrition Alteration  Diet Type: Patient's Preference  Skin  Skin Reaction: 0 - No changes  Skin Intervention: Using, Rosy cream and Ucerin during the day. Will start some Aquaphor at night        Cardiovascular  Respiratory effort: 1 - Normal - without distress        Psychosocial  Psychosocial Note: Feeling well  Pain Assessment  Pain Note: see above      Objective:   /74   Pulse 74   Wt 67.3 kg (148 lb 6.4 oz)   LMP 10/24/2007   SpO2 100%   BMI 23.95 kg/m    Gen: Appears well, in no acute distress  Skin: No erythema  Respiratory: breathing comfortably on room air without any audible wheeze and noncyanotic.    Labs:  CBC RESULTS: No results for input(s): WBC, RBC, HGB, HCT, MCV, MCH, MCHC, RDW, PLT in the last 59947 hours.  ELECTROLYTES:  Recent Labs   Lab Test 12/16/15  1006   GLC 84       Assessment:    Tolerating radiation therapy well.  All questions and concerns addressed.    Toxicities:  Fatigue: grade 0  Dermatitis: grade 0    Plan:   1. Continue current therapy.      Mosaiq chart and setup information reviewed. Imaging reviewed.     Medication Review  Med Note: No changes indicated    Educational Topic Discussed  Additional Instructions: Machine was down so no  treatment today  Education Instructions: Reviewed SE    Patient was seen and discussed with my attending physician, Dr. Campoverde.    Connor Angela MD, MS PGY-2  PGY-2 Radiation Oncology  Department of Radiation Oncology  [facility], [place]  Phone: [phone]    Please do not send letter to referring physician.      I saw and examined the patient with the resident.  I have reviewed and edited the resident's note and agree with the plan of care.        Cleo Campoverde MD

## 2023-07-10 ENCOUNTER — OFFICE VISIT (OUTPATIENT)
Dept: RADIATION ONCOLOGY | Facility: CLINIC | Age: 53
End: 2023-07-10
Attending: RADIOLOGY
Payer: COMMERCIAL

## 2023-07-10 VITALS
SYSTOLIC BLOOD PRESSURE: 108 MMHG | BODY MASS INDEX: 23.95 KG/M2 | OXYGEN SATURATION: 100 % | WEIGHT: 148.4 LBS | HEART RATE: 74 BPM | DIASTOLIC BLOOD PRESSURE: 74 MMHG

## 2023-07-10 DIAGNOSIS — D05.12 DUCTAL CARCINOMA IN SITU (DCIS) OF LEFT BREAST: Primary | ICD-10-CM

## 2023-07-10 NOTE — LETTER
7/10/2023         RE: Luna Eugene  [address]  [place] [address]        Dear Colleague,    Thank you for referring your patient, Luna Eugene, to the [facility] RADIATION ONCOLOGY. Please see a copy of my visit note below.    [facility] PHYSICIANS  SPECIALIZING IN BREAKTHROUGHS  Radiation Oncology    On Treatment Visit Note      Luna Eugene      Date: 7/10/2023   MRN: [id number]   : 1970  Diagnosis: Breast cancer      Reason for Visit:  On Radiation Treatment Visit     Treatment Summary to Date  Site:    Treatment Site: Lt breast Current Dose:    Current Dose: 795/5240 cGy Fractions:    Fractions: 3/20      Chemotherapy  Chemo concurrent with radx?: No    ED Visit/Hospital Admission: None    Treatment Breaks:   1.2023: planned break  . 7/10/2023: machine malfunction      Subjective: Patient feels well, reporting no side effects at this time.  Denies fatigue or skin changes.  Maintaining good appetite, no nausea.    Nursing ROS:   Nutrition Alteration  Diet Type: Patient's Preference  Skin  Skin Reaction: 0 - No changes  Skin Intervention: Using, Rosy cream and Ucerin during the day. Will start some Aquaphor at night        Cardiovascular  Respiratory effort: 1 - Normal - without distress        Psychosocial  Psychosocial Note: Feeling well  Pain Assessment  Pain Note: see above      Objective:   /74   Pulse 74   Wt 67.3 kg (148 lb 6.4 oz)   LMP 10/24/2007   SpO2 100%   BMI 23.95 kg/m    Gen: Appears well, in no acute distress  Skin: No erythema  Respiratory: breathing comfortably on room air without any audible wheeze and noncyanotic.    Labs:  CBC RESULTS: No results for input(s): WBC, RBC, HGB, HCT, MCV, MCH, MCHC, RDW, PLT in the last 98951 hours.  ELECTROLYTES:  Recent Labs   Lab Test 12/16/15  1006   GLC 84       Assessment:    Tolerating radiation therapy well.  All questions and concerns addressed.    Toxicities:  Fatigue: grade  0  Dermatitis: grade 0    Plan:   1. Continue current therapy.      Mosaiq chart and setup information reviewed. Imaging reviewed.     Medication Review  Med Note: No changes indicated    Educational Topic Discussed  Additional Instructions: Machine was down so no treatment today  Education Instructions: Reviewed SE    Patient was seen and discussed with my attending physician, Dr. Campoverde.    Connor Angela MD, MS PGY-2  PGY-2 Radiation Oncology  Department of Radiation Oncology  [facility], [place]  Phone: [phone]    Please do not send letter to referring physician.      I saw and examined the patient with the resident.  I have reviewed and edited the resident's note and agree with the plan of care.        Cleo Campoverde MD      Again, thank you for allowing me to participate in the care of your patient.        Sincerely,        Cleo Campoverde MD

## 2023-07-11 ENCOUNTER — APPOINTMENT (OUTPATIENT)
Dept: RADIATION ONCOLOGY | Facility: CLINIC | Age: 53
End: 2023-07-11
Attending: RADIOLOGY
Payer: COMMERCIAL

## 2023-07-11 PROCEDURE — 77387 GUIDANCE FOR RADJ TX DLVR: CPT | Performed by: RADIOLOGY

## 2023-07-11 PROCEDURE — 77412 RADIATION TX DELIVERY LVL 3: CPT | Performed by: RADIOLOGY

## 2023-07-11 PROCEDURE — G6002 STEREOSCOPIC X-RAY GUIDANCE: HCPCS | Mod: 26 | Performed by: RADIOLOGY

## 2023-07-12 ENCOUNTER — APPOINTMENT (OUTPATIENT)
Dept: RADIATION ONCOLOGY | Facility: CLINIC | Age: 53
End: 2023-07-12
Attending: RADIOLOGY
Payer: COMMERCIAL

## 2023-07-12 PROCEDURE — 77412 RADIATION TX DELIVERY LVL 3: CPT | Performed by: RADIOLOGY

## 2023-07-12 PROCEDURE — 77387 GUIDANCE FOR RADJ TX DLVR: CPT | Performed by: RADIOLOGY

## 2023-07-12 PROCEDURE — 77336 RADIATION PHYSICS CONSULT: CPT | Performed by: RADIOLOGY

## 2023-07-12 PROCEDURE — 77427 RADIATION TX MANAGEMENT X5: CPT | Performed by: RADIOLOGY

## 2023-07-13 ENCOUNTER — APPOINTMENT (OUTPATIENT)
Dept: RADIATION ONCOLOGY | Facility: CLINIC | Age: 53
End: 2023-07-13
Attending: RADIOLOGY
Payer: COMMERCIAL

## 2023-07-13 PROCEDURE — 77387 GUIDANCE FOR RADJ TX DLVR: CPT | Performed by: RADIOLOGY

## 2023-07-13 PROCEDURE — 77412 RADIATION TX DELIVERY LVL 3: CPT | Performed by: RADIOLOGY

## 2023-07-14 ENCOUNTER — APPOINTMENT (OUTPATIENT)
Dept: RADIATION ONCOLOGY | Facility: CLINIC | Age: 53
End: 2023-07-14
Attending: RADIOLOGY
Payer: COMMERCIAL

## 2023-07-14 PROCEDURE — 77387 GUIDANCE FOR RADJ TX DLVR: CPT | Performed by: RADIOLOGY

## 2023-07-14 PROCEDURE — 77412 RADIATION TX DELIVERY LVL 3: CPT | Performed by: RADIOLOGY

## 2023-07-17 ENCOUNTER — OFFICE VISIT (OUTPATIENT)
Dept: RADIATION ONCOLOGY | Facility: CLINIC | Age: 53
End: 2023-07-17
Attending: RADIOLOGY
Payer: COMMERCIAL

## 2023-07-17 VITALS
OXYGEN SATURATION: 97 % | SYSTOLIC BLOOD PRESSURE: 112 MMHG | BODY MASS INDEX: 23.73 KG/M2 | HEART RATE: 96 BPM | WEIGHT: 147 LBS | DIASTOLIC BLOOD PRESSURE: 72 MMHG

## 2023-07-17 DIAGNOSIS — D05.12 DUCTAL CARCINOMA IN SITU (DCIS) OF LEFT BREAST: Primary | ICD-10-CM

## 2023-07-17 PROCEDURE — G6002 STEREOSCOPIC X-RAY GUIDANCE: HCPCS | Mod: 26 | Performed by: RADIOLOGY

## 2023-07-17 PROCEDURE — 77412 RADIATION TX DELIVERY LVL 3: CPT | Performed by: RADIOLOGY

## 2023-07-17 PROCEDURE — 77387 GUIDANCE FOR RADJ TX DLVR: CPT | Performed by: RADIOLOGY

## 2023-07-17 NOTE — PROGRESS NOTES
[facility] PHYSICIANS  SPECIALIZING IN BREAKTHROUGHS  Radiation Oncology    On Treatment Visit Note      Luna Eugene      Date: 2023   MRN: [id number]   : 1970  Diagnosis: Breast cancer      Reason for Visit:  On Radiation Treatment Visit     Treatment Summary to Date  Site:    Treatment Site: Lt breast Current Dose:    Current Dose: 2120/5240 cGy Fractions:    Fractions: 8/20      Chemotherapy  Chemo concurrent with radx?: No    ED Visit/Hospital Admission: None    Treatment Breaks: None    Subjective: Thus far, Luna is tolerating treatment well.  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  She does report some mild irritation around the left breast with some redness, nonpainful, no desquamation.  She is using Aquaphor cream, along with Vanicream and Eucerin during the day.  She has no lymphedema or swelling of her armpits.  Her range of motion is intact in her bilateral upper extremities.    Nursing ROS:   Nutrition Alteration  Diet Type: Patient's Preference  Nutrition Note: appetite good  Skin  Skin Reaction: 0 - No changes  Skin Intervention: Using, Rosy cream and Ucerin during the day. Will start some Aquaphor at night        Cardiovascular  Respiratory effort: 1 - Normal - without distress        Psychosocial  Psychosocial Note: Feeling well  Pain Assessment  Pain Note: see above      Objective:   /72   Pulse 96   Wt 66.7 kg (147 lb)   LMP 10/24/2007   SpO2 97%   BMI 23.73 kg/m    Gen: Appears well, in no acute distress  Skin: Mild diffuse erythema superiorly over L breast treatment field    Labs:  CBC RESULTS: No results for input(s): WBC, RBC, HGB, HCT, MCV, MCH, MCHC, RDW, PLT in the last 51225 hours.  ELECTROLYTES:  Recent Labs   Lab Test 12/16/15  1006   GLC 84       Assessment:    Tolerating radiation therapy well.  All questions and concerns addressed.    Toxicities:  Fatigue: Grade 0: No  toxicity  Nausea: Grade 0: No toxicity  Pain: Grade 0: No toxicity  Dermatitis: Grade 1: Faint erythema or dry desquamation    Plan:   1. Continue current therapy.      Mosaiq chart and setup information reviewed. Imaging reviewed.     Medication Review  Med Note: No changes indicated    Educational Topic Discussed  Education Instructions: Reviewed SE    Patient was seen and discussed with my attending physician, Dr. Campoverde.    Connor Angela MD, MS PGY-2  PGY-2 Radiation Oncology  Department of Radiation Oncology  [facility]  Phone: [phone]      Please do not send letter to referring physician.        I saw and examined the patient with the resident.  I have reviewed and edited the resident's note and agree with the plan of care.             Cleo Campoverde MD

## 2023-07-17 NOTE — LETTER
2023         RE: Luna Eugene  [address]  [place] [address]        Dear Colleague,    Thank you for referring your patient, Luna Eugene, to the [facility] RADIATION ONCOLOGY. Please see a copy of my visit note below.      [facility] PHYSICIANS  SPECIALIZING IN BREAKTHROUGHS  Radiation Oncology    On Treatment Visit Note      Luna Eugene      Date: 2023   MRN: [id number]   : 1970  Diagnosis: Breast cancer      Reason for Visit:  On Radiation Treatment Visit     Treatment Summary to Date  Site:    Treatment Site: Lt breast Current Dose:    Current Dose: 2120/5240 cGy Fractions:    Fractions:       Chemotherapy  Chemo concurrent with radx?: No    ED Visit/Hospital Admission: None    Treatment Breaks: None    Subjective: Thus far, Luna is tolerating treatment well.  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  She does report some mild irritation around the left breast with some redness, nonpainful, no desquamation.  She is using Aquaphor cream, along with Vanicream and Eucerin during the day.  She has no lymphedema or swelling of her armpits.  Her range of motion is intact in her bilateral upper extremities.    Nursing ROS:   Nutrition Alteration  Diet Type: Patient's Preference  Nutrition Note: appetite good  Skin  Skin Reaction: 0 - No changes  Skin Intervention: Using, Rosy cream and Ucerin during the day. Will start some Aquaphor at night        Cardiovascular  Respiratory effort: 1 - Normal - without distress        Psychosocial  Psychosocial Note: Feeling well  Pain Assessment  Pain Note: see above      Objective:   /72   Pulse 96   Wt 66.7 kg (147 lb)   LMP 10/24/2007   SpO2 97%   BMI 23.73 kg/m    Gen: Appears well, in no acute distress  Skin: Mild diffuse erythema superiorly over L breast treatment field    Labs:  CBC RESULTS: No results for input(s): WBC, RBC, HGB,  HCT, MCV, MCH, MCHC, RDW, PLT in the last 20776 hours.  ELECTROLYTES:  Recent Labs   Lab Test 12/16/15  1006   GLC 84       Assessment:    Tolerating radiation therapy well.  All questions and concerns addressed.    Toxicities:  Fatigue: Grade 0: No toxicity  Nausea: Grade 0: No toxicity  Pain: Grade 0: No toxicity  Dermatitis: Grade 1: Faint erythema or dry desquamation    Plan:   1. Continue current therapy.      Mosaiq chart and setup information reviewed. Imaging reviewed.     Medication Review  Med Note: No changes indicated    Educational Topic Discussed  Education Instructions: Reviewed SE    Patient was seen and discussed with my attending physician, Dr. Campoverde.    Connor Angela MD, MS PGY-2  PGY-2 Radiation Oncology  Department of Radiation Oncology  [facility], [place]  Phone: [phone]      Please do not send letter to referring physician.        I saw and examined the patient with the resident.  I have reviewed and edited the resident's note and agree with the plan of care.             Cleo Campoverde MD      Again, thank you for allowing me to participate in the care of your patient.        Sincerely,        Cleo Campoverde MD

## 2023-07-18 ENCOUNTER — APPOINTMENT (OUTPATIENT)
Dept: RADIATION ONCOLOGY | Facility: CLINIC | Age: 53
End: 2023-07-18
Attending: RADIOLOGY
Payer: COMMERCIAL

## 2023-07-18 PROCEDURE — 77334 RADIATION TREATMENT AID(S): CPT | Performed by: RADIOLOGY

## 2023-07-18 PROCEDURE — 77321 SPECIAL TELETX PORT PLAN: CPT | Mod: 26 | Performed by: RADIOLOGY

## 2023-07-18 PROCEDURE — 77387 GUIDANCE FOR RADJ TX DLVR: CPT | Performed by: RADIOLOGY

## 2023-07-18 PROCEDURE — 77412 RADIATION TX DELIVERY LVL 3: CPT | Performed by: RADIOLOGY

## 2023-07-18 PROCEDURE — 77334 RADIATION TREATMENT AID(S): CPT | Mod: 26 | Performed by: RADIOLOGY

## 2023-07-18 PROCEDURE — 77321 SPECIAL TELETX PORT PLAN: CPT | Performed by: RADIOLOGY

## 2023-07-19 ENCOUNTER — APPOINTMENT (OUTPATIENT)
Dept: RADIATION ONCOLOGY | Facility: CLINIC | Age: 53
End: 2023-07-19
Attending: RADIOLOGY
Payer: COMMERCIAL

## 2023-07-19 PROCEDURE — 77412 RADIATION TX DELIVERY LVL 3: CPT | Performed by: RADIOLOGY

## 2023-07-19 PROCEDURE — 77336 RADIATION PHYSICS CONSULT: CPT | Performed by: RADIOLOGY

## 2023-07-19 PROCEDURE — 77387 GUIDANCE FOR RADJ TX DLVR: CPT | Performed by: RADIOLOGY

## 2023-07-19 PROCEDURE — 77427 RADIATION TX MANAGEMENT X5: CPT | Mod: GC | Performed by: RADIOLOGY

## 2023-07-19 PROCEDURE — G6002 STEREOSCOPIC X-RAY GUIDANCE: HCPCS | Mod: 26 | Performed by: RADIOLOGY

## 2023-07-20 ENCOUNTER — APPOINTMENT (OUTPATIENT)
Dept: RADIATION ONCOLOGY | Facility: CLINIC | Age: 53
End: 2023-07-20
Attending: RADIOLOGY
Payer: COMMERCIAL

## 2023-07-20 PROCEDURE — 77412 RADIATION TX DELIVERY LVL 3: CPT | Performed by: RADIOLOGY

## 2023-07-20 PROCEDURE — G6002 STEREOSCOPIC X-RAY GUIDANCE: HCPCS | Mod: 26 | Performed by: RADIOLOGY

## 2023-07-20 PROCEDURE — 77387 GUIDANCE FOR RADJ TX DLVR: CPT | Performed by: RADIOLOGY

## 2023-07-21 ENCOUNTER — APPOINTMENT (OUTPATIENT)
Dept: RADIATION ONCOLOGY | Facility: CLINIC | Age: 53
End: 2023-07-21
Attending: RADIOLOGY
Payer: COMMERCIAL

## 2023-07-21 PROCEDURE — 77412 RADIATION TX DELIVERY LVL 3: CPT | Performed by: RADIOLOGY

## 2023-07-21 PROCEDURE — 77280 THER RAD SIMULAJ FIELD SMPL: CPT | Performed by: RADIOLOGY

## 2023-07-21 PROCEDURE — 77280 THER RAD SIMULAJ FIELD SMPL: CPT | Mod: 26 | Performed by: RADIOLOGY

## 2023-07-24 ENCOUNTER — OFFICE VISIT (OUTPATIENT)
Dept: RADIATION ONCOLOGY | Facility: CLINIC | Age: 53
End: 2023-07-24
Attending: RADIOLOGY
Payer: COMMERCIAL

## 2023-07-24 VITALS
DIASTOLIC BLOOD PRESSURE: 76 MMHG | SYSTOLIC BLOOD PRESSURE: 113 MMHG | BODY MASS INDEX: 23.84 KG/M2 | HEART RATE: 84 BPM | WEIGHT: 147.7 LBS | OXYGEN SATURATION: 97 %

## 2023-07-24 DIAGNOSIS — D05.12 DUCTAL CARCINOMA IN SITU (DCIS) OF LEFT BREAST: Primary | ICD-10-CM

## 2023-07-24 PROCEDURE — G6002 STEREOSCOPIC X-RAY GUIDANCE: HCPCS | Mod: 26 | Performed by: RADIOLOGY

## 2023-07-24 PROCEDURE — 77412 RADIATION TX DELIVERY LVL 3: CPT | Performed by: RADIOLOGY

## 2023-07-24 PROCEDURE — 77387 GUIDANCE FOR RADJ TX DLVR: CPT | Performed by: RADIOLOGY

## 2023-07-24 NOTE — LETTER
"    2023         RE: Luna Eugene  [address] [place]  [place] [address]        Dear Colleague,    Thank you for referring your patient, Luna Eugene, to the [facility] RADIATION ONCOLOGY. Please see a copy of my visit note below.    [facility] PHYSICIANS  SPECIALIZING IN BREAKTHROUGHS  Radiation Oncology    On Treatment Visit Note      Luna Eugene      Date: 2023   MRN: [id number]   : 1970  Diagnosis: Breast cancer      Reason for Visit:  On Radiation Treatment Visit     Treatment Summary to Date  Treatment Site: Lt breast Current Dose: 3445/5240 cGy Fractions:       Chemotherapy  Chemo concurrent with radx?: No    ED Visit/Hosiptal Admission: None    Treatment Breaks: 7/10/2023 (Machine malfunction)      Subjective:   Luna is tolerating treatment well. She has noticed  a \"bruising\" in her axilla. It is not particularly painful. She has been using Vanicream, Eucerin and Aquaphor for skin care. Her energy level is well overall, except for last Friday, when she felt that she needed a nap.     Nursing ROS:   Nutrition Alteration  Diet Type: Patient's Preference  Nutrition Note: appetite good  Skin  Skin Reaction: 1 - Faint erythema or dry desquamation  Skin Intervention: Using, Rosy cream and Ucerin during the day. Will start some Aquaphor at night        Cardiovascular  Respiratory effort: 1 - Normal - without distress        Psychosocial  Psychosocial Note: Feeling well  Pain Assessment  Pain Note: see above      Objective:   /76   Pulse 84   Wt 67 kg (147 lb 11.2 oz)   LMP 10/24/2007   SpO2 97%   BMI 23.84 kg/m    Gen: Appears well, in no acute distress  Skin: Mild erythema of the left breast, with hyperpigmentation in the left axilla. No skin breakdown.     Labs:  CBC RESULTS: No results for input(s): WBC, RBC, HGB, HCT, MCV, MCH, MCHC, RDW, PLT in the last 32264 hours.  ELECTROLYTES:  Recent Labs   Lab Test 12/16/15  1006   GLC 84 "       Assessment:    Tolerating radiation therapy well.  All questions and concerns addressed.    Toxicities:  Fatigue: Grade 1: Fatigue relieved by rest  Dermatitis: Grade 1: Faint erythema or dry desquamation    Plan:   Continue current therapy.    Reassured her that the skin reaction is as expected at her current dose. She is to continue with current skin care regimen.       Mosaiq chart and setup information reviewed  Ports checked    Medication Review  Med Note: No changes indicated    Educational Topic Discussed  Education Instructions: Reviewed SE        Cleo Campoverde MD/PhD  [phone] clinic  Pager [phone]    Please do not send letter to referring physician.           Again, thank you for allowing me to participate in the care of your patient.        Sincerely,        Cleo Campoverde MD

## 2023-07-24 NOTE — PROGRESS NOTES
"[facility] PHYSICIANS  SPECIALIZING IN BREAKTHROUGHS  Radiation Oncology    On Treatment Visit Note      Luna Eugene      Date: 2023   MRN: [id number]   : 1970  Diagnosis: Breast cancer      Reason for Visit:  On Radiation Treatment Visit     Treatment Summary to Date  Treatment Site: Lt breast Current Dose: 3445/5240 cGy Fractions:       Chemotherapy  Chemo concurrent with radx?: No    ED Visit/Hosiptal Admission: None    Treatment Breaks: 7/10/2023 (Machine malfunction)      Subjective:   Luna is tolerating treatment well. She has noticed  a \"bruising\" in her axilla. It is not particularly painful. She has been using Vanicream, Eucerin and Aquaphor for skin care. Her energy level is well overall, except for last Friday, when she felt that she needed a nap.     Nursing ROS:   Nutrition Alteration  Diet Type: Patient's Preference  Nutrition Note: appetite good  Skin  Skin Reaction: 1 - Faint erythema or dry desquamation  Skin Intervention: Using, Rosy cream and Ucerin during the day. Will start some Aquaphor at night        Cardiovascular  Respiratory effort: 1 - Normal - without distress        Psychosocial  Psychosocial Note: Feeling well  Pain Assessment  Pain Note: see above      Objective:   /76   Pulse 84   Wt 67 kg (147 lb 11.2 oz)   LMP 10/24/2007   SpO2 97%   BMI 23.84 kg/m    Gen: Appears well, in no acute distress  Skin: Mild erythema of the left breast, with hyperpigmentation in the left axilla. No skin breakdown.     Labs:  CBC RESULTS: No results for input(s): WBC, RBC, HGB, HCT, MCV, MCH, MCHC, RDW, PLT in the last 75547 hours.  ELECTROLYTES:  Recent Labs   Lab Test 12/16/15  1006   GLC 84       Assessment:    Tolerating radiation therapy well.  All questions and concerns addressed.    Toxicities:  Fatigue: Grade 1: Fatigue relieved by rest  Dermatitis: Grade 1: Faint erythema or dry desquamation    Plan:   Continue current therapy.    Reassured her " that the skin reaction is as expected at her current dose. She is to continue with current skin care regimen.       Mosaiq chart and setup information reviewed  Ports checked    Medication Review  Med Note: No changes indicated    Educational Topic Discussed  Education Instructions: Reviewed SE        Cleo Campoverde MD/PhD  [phone] clinic  Pager [phone]    Please do not send letter to referring physician.

## 2023-07-25 ENCOUNTER — APPOINTMENT (OUTPATIENT)
Dept: RADIATION ONCOLOGY | Facility: CLINIC | Age: 53
End: 2023-07-25
Attending: RADIOLOGY
Payer: COMMERCIAL

## 2023-07-25 PROCEDURE — 77412 RADIATION TX DELIVERY LVL 3: CPT | Performed by: RADIOLOGY

## 2023-07-25 PROCEDURE — G6002 STEREOSCOPIC X-RAY GUIDANCE: HCPCS | Mod: 26 | Performed by: RADIOLOGY

## 2023-07-25 PROCEDURE — 77387 GUIDANCE FOR RADJ TX DLVR: CPT | Performed by: RADIOLOGY

## 2023-07-26 ENCOUNTER — APPOINTMENT (OUTPATIENT)
Dept: RADIATION ONCOLOGY | Facility: CLINIC | Age: 53
End: 2023-07-26
Attending: RADIOLOGY
Payer: COMMERCIAL

## 2023-07-26 PROCEDURE — 77336 RADIATION PHYSICS CONSULT: CPT | Performed by: RADIOLOGY

## 2023-07-26 PROCEDURE — 77427 RADIATION TX MANAGEMENT X5: CPT | Performed by: RADIOLOGY

## 2023-07-26 PROCEDURE — G6002 STEREOSCOPIC X-RAY GUIDANCE: HCPCS | Mod: 26 | Performed by: RADIOLOGY

## 2023-07-26 PROCEDURE — 77412 RADIATION TX DELIVERY LVL 3: CPT | Performed by: RADIOLOGY

## 2023-07-26 PROCEDURE — 77387 GUIDANCE FOR RADJ TX DLVR: CPT | Performed by: RADIOLOGY

## 2023-07-27 ENCOUNTER — APPOINTMENT (OUTPATIENT)
Dept: RADIATION ONCOLOGY | Facility: CLINIC | Age: 53
End: 2023-07-27
Attending: RADIOLOGY
Payer: COMMERCIAL

## 2023-07-27 PROCEDURE — 77387 GUIDANCE FOR RADJ TX DLVR: CPT | Performed by: RADIOLOGY

## 2023-07-27 PROCEDURE — 77412 RADIATION TX DELIVERY LVL 3: CPT | Performed by: RADIOLOGY

## 2023-07-27 PROCEDURE — G6002 STEREOSCOPIC X-RAY GUIDANCE: HCPCS | Mod: 26 | Performed by: RADIOLOGY

## 2023-07-28 ENCOUNTER — APPOINTMENT (OUTPATIENT)
Dept: RADIATION ONCOLOGY | Facility: CLINIC | Age: 53
End: 2023-07-28
Attending: RADIOLOGY
Payer: COMMERCIAL

## 2023-07-28 PROCEDURE — 77412 RADIATION TX DELIVERY LVL 3: CPT | Performed by: RADIOLOGY

## 2023-07-31 ENCOUNTER — APPOINTMENT (OUTPATIENT)
Dept: RADIATION ONCOLOGY | Facility: CLINIC | Age: 53
End: 2023-07-31
Attending: RADIOLOGY
Payer: COMMERCIAL

## 2023-07-31 VITALS
BODY MASS INDEX: 23.73 KG/M2 | SYSTOLIC BLOOD PRESSURE: 113 MMHG | HEART RATE: 63 BPM | OXYGEN SATURATION: 99 % | DIASTOLIC BLOOD PRESSURE: 75 MMHG | WEIGHT: 147 LBS

## 2023-07-31 DIAGNOSIS — L30.9 DERMATITIS: Primary | ICD-10-CM

## 2023-07-31 PROCEDURE — 77412 RADIATION TX DELIVERY LVL 3: CPT | Performed by: RADIOLOGY

## 2023-07-31 PROCEDURE — 77427 RADIATION TX MANAGEMENT X5: CPT | Performed by: RADIOLOGY

## 2023-07-31 RX ORDER — HYDROCORTISONE 2.5 %
CREAM (GRAM) TOPICAL 2 TIMES DAILY
Qty: 30 G | Refills: 1 | Status: SHIPPED | OUTPATIENT
Start: 2023-07-31

## 2023-07-31 NOTE — LETTER
2023         RE: Luna Eugene  [address]  [place] [address]        Dear Colleague,    Thank you for referring your patient, Luna Eugene, to the [facility] RADIATION ONCOLOGY. Please see a copy of my visit note below.    [facility] PHYSICIANS  SPECIALIZING IN BREAKTHROUGHS  Radiation Oncology    On Treatment Visit Note      Luna Eugene      Date: 2023   MRN: [id number]   : 1970  Diagnosis: Breast cancer      Reason for Visit:  On Radiation Treatment Visit     Treatment Summary to Date  Treatment Site: Lt breast Current Dose: 4740/5240 cGy Fractions:       Chemotherapy  Chemo concurrent with radx?: No    ED Visit/Hosiptal Admission: None    Treatment Breaks:  (holiday); 7/10 (machine malfunction)      Subjective:   Luna continues to do well. She has noticed a small blister in the axilla. She has little pain but mainly itching. She has been using 1% hydrocortisone cream in the axilla and breast.     Nursing ROS:   Nutrition Alteration  Diet Type: Patient's Preference  Nutrition Note: appetite good  Skin  Skin Reaction: 1 - Faint erythema or dry desquamation  Skin Intervention: Using, Rosy cream and Ucerin during the day. Will start some Aquaphor at night  Skin Note: Some itching, hydrocortisone cream then aquaphor over. May try PO benedryl at night to help with itching.        Cardiovascular  Respiratory effort: 1 - Normal - without distress        Psychosocial  Psychosocial Note: Feeling well  Pain Assessment  Pain Note: see above      Objective:   /75   Pulse 63   Wt 66.7 kg (147 lb)   LMP 10/24/2007   SpO2 99%   BMI 23.73 kg/m    Gen: Appears well, in no acute distress  Skin: Mild diffuse erythema of the left breast. A 3 mm blister in the left axilla.     Labs:  CBC RESULTS: No results for input(s): WBC, RBC, HGB, HCT, MCV, MCH, MCHC, RDW, PLT in the last 37187 hours.  ELECTROLYTES:  Recent Labs   Lab Test 12/16/15  1006    GLC 84       Assessment:    Tolerating radiation therapy well.  All questions and concerns addressed.    Toxicities:  Fatigue: Grade 1: Fatigue relieved by rest  Dermatitis: Grade 1: Faint erythema or dry desquamation    Plan:   Continue current therapy.    Will complete treatment in 2 more fractions.   Follow up in 1 month with Ms. Brittany Tyson NP.  Dermatitis: 2.5% hydrocortisone cream for itching. Cool Magic and Mepilex dressings given.       Mosaiq chart and setup information reviewed  Ports checked    Medication Review  Med Note: No changes indicated    Educational Topic Discussed  Education Instructions: D/C instructions reviewed        Cleo Campoverde MD/PhD  [phone] clinic  Pager [phone]    Please do not send letter to referring physician.            Again, thank you for allowing me to participate in the care of your patient.        Sincerely,        Cleo Campoverde MD

## 2023-07-31 NOTE — PROGRESS NOTES
[facility] PHYSICIANS  SPECIALIZING IN BREAKTHROUGHS  Radiation Oncology    On Treatment Visit Note      Luna Eugene      Date: 2023   MRN: [id number]   : 1970  Diagnosis: Breast cancer      Reason for Visit:  On Radiation Treatment Visit     Treatment Summary to Date  Treatment Site: Lt breast Current Dose: 4740/5240 cGy Fractions:       Chemotherapy  Chemo concurrent with radx?: No    ED Visit/Hosiptal Admission: None    Treatment Breaks:  (holiday); 7/10 (machine malfunction)      Subjective:   Luna continues to do well. She has noticed a small blister in the axilla. She has little pain but mainly itching. She has been using 1% hydrocortisone cream in the axilla and breast.     Nursing ROS:   Nutrition Alteration  Diet Type: Patient's Preference  Nutrition Note: appetite good  Skin  Skin Reaction: 1 - Faint erythema or dry desquamation  Skin Intervention: Using, Rosy cream and Ucerin during the day. Will start some Aquaphor at night  Skin Note: Some itching, hydrocortisone cream then aquaphor over. May try PO benedryl at night to help with itching.        Cardiovascular  Respiratory effort: 1 - Normal - without distress        Psychosocial  Psychosocial Note: Feeling well  Pain Assessment  Pain Note: see above      Objective:   /75   Pulse 63   Wt 66.7 kg (147 lb)   LMP 10/24/2007   SpO2 99%   BMI 23.73 kg/m    Gen: Appears well, in no acute distress  Skin: Mild diffuse erythema of the left breast. A 3 mm blister in the left axilla.     Labs:  CBC RESULTS: No results for input(s): WBC, RBC, HGB, HCT, MCV, MCH, MCHC, RDW, PLT in the last 46108 hours.  ELECTROLYTES:  Recent Labs   Lab Test 12/16/15  1006   GLC 84       Assessment:    Tolerating radiation therapy well.  All questions and concerns addressed.    Toxicities:  Fatigue: Grade 1: Fatigue relieved by rest  Dermatitis: Grade 1: Faint erythema or dry desquamation    Plan:   Continue current therapy.     Will complete treatment in 2 more fractions.   Follow up in 1 month with Ms. Brittany Tyson NP.  Dermatitis: 2.5% hydrocortisone cream for itching. Cool Magic and Mepilex dressings given.       Mosaiq chart and setup information reviewed  Ports checked    Medication Review  Med Note: No changes indicated    Educational Topic Discussed  Education Instructions: D/C instructions reviewed        Cleo Campoverde MD/PhD  [phone] clinic  Pager [phone]    Please do not send letter to referring physician.

## 2023-07-31 NOTE — PATIENT INSTRUCTIONS
Continuing Management of the Effects of Radiation Treatment    The side effects of radiation therapy should gradually decrease in 2 to 3 weeks after you have finished radiation.  Some effects take longer to resolve.    Skin reactions:  Skin changes (such as redness or irritation) should begin to get better gradually.  Some people will have a permanent change in skin color.  Their skin may be more pink or  tan  than the untreated skin.  The skin may be thinner or more fragile than before treatment.  Continue to use a gentle moisturizing lotion for several months.  You should always protect the skin in the area that was treated by using sunscreen of spf 30 or higher.      Other skin care instructions:    Fatigue caused by radiation therapy will decrease and your energy will improve.    For concerns or questions call Department of Therapeutic Radiology [phone]

## 2023-08-01 ENCOUNTER — APPOINTMENT (OUTPATIENT)
Dept: RADIATION ONCOLOGY | Facility: CLINIC | Age: 53
End: 2023-08-01
Attending: RADIOLOGY
Payer: COMMERCIAL

## 2023-08-01 PROCEDURE — 77412 RADIATION TX DELIVERY LVL 3: CPT | Performed by: RADIOLOGY

## 2023-08-02 ENCOUNTER — APPOINTMENT (OUTPATIENT)
Dept: RADIATION ONCOLOGY | Facility: CLINIC | Age: 53
End: 2023-08-02
Attending: RADIOLOGY
Payer: COMMERCIAL

## 2023-08-02 PROCEDURE — 77412 RADIATION TX DELIVERY LVL 3: CPT | Performed by: RADIOLOGY

## 2023-08-02 PROCEDURE — 77336 RADIATION PHYSICS CONSULT: CPT | Performed by: RADIOLOGY

## 2023-08-03 ENCOUNTER — ONCOLOGY VISIT (OUTPATIENT)
Dept: RADIATION ONCOLOGY | Facility: CLINIC | Age: 53
End: 2023-08-03
Payer: COMMERCIAL

## 2023-08-03 NOTE — LETTER
8/3/2023         RE: Luna Eugene  [address] [place] [address]        Dear Colleague,    Thank you for referring your patient, Luna Eugene, to the [facility] RADIATION ONCOLOGY. Please see a copy of my visit note below.    No notes on file    Again, thank you for allowing me to participate in the care of your patient.        Sincerely,        Cleo Campoverde MD

## 2023-08-09 DIAGNOSIS — D05.12 DUCTAL CARCINOMA IN SITU (DCIS) OF LEFT BREAST: Primary | ICD-10-CM

## 2023-08-09 DIAGNOSIS — N95.1 MENOPAUSAL SYNDROME (HOT FLASHES): ICD-10-CM

## 2023-08-09 RX ORDER — GABAPENTIN 300 MG/1
300 CAPSULE ORAL 3 TIMES DAILY
Qty: 60 CAPSULE | Refills: 1 | Status: SHIPPED | OUTPATIENT
Start: 2023-08-09 | End: 2024-06-09

## 2023-08-18 ENCOUNTER — PATIENT OUTREACH (OUTPATIENT)
Dept: ONCOLOGY | Facility: CLINIC | Age: 53
End: 2023-08-18
Payer: COMMERCIAL

## 2023-08-18 NOTE — PROGRESS NOTES
Telephoned and spoke with Luna after staff reports she had questions about genetic counseling. She is scheduled to meet with Florinda Roque GC on 8/23 but is considering rescheduling to a later date. She asks if it is critical to do this immediately. Genetic counselors are out to December for reschedule at this point. Reassured her that the breast actionable panel results were negative but Dr. Coon did ask for the genetic counseling to occur. Staff message sent to NICOLE copying ordering provider asking for any issues of concern. Luna has my contact information and know I will contact her when I have reply.    8/21 16:19 - Telephoned and informed Luna that Florinda Roque feels it is absolutely appropriate for her to be seen in December if she decides to postpone her genetic counseling consult. Luna has decided to proceed with consult on 8/23 after all.

## 2023-08-19 NOTE — PROCEDURES
Radiotherapy Treatment Summary          Date of Report: August  3, 2023     PATIENT: JUAN ALBERTO EUGENE  MEDICAL RECORD NO: [id number]  : 1970     DIAGNOSIS: C50.412 Malignant neoplasm of upper-outer quadrant of left female breast  INTENT OF RADIOTHERAPY: Cure  PATHOLOGY:  DCIS, grade 3, ER+                              STAGE: TisNx  CONCURRENT SYSTEMIC THERAPY: No                   Details of the treatments summarized below are found in records kept in the Department of Radiation Oncology at   [facility].     Treatment Summary:  Radiation Oncology - Course: 1 Protocol:   Treatment Site Current Dose Modality From To Elapsed Days Fx.  1 L Breast  4,240 cGy 06 X  7/05/2023  2023  22 16  1 L Breast Boost  1,000 cGy e12  2023   5  4          Dose per Fraction: 265 cGy  Total Dose: 5240 to boosted L breast             COMMENTS:   Ms. Eugene is a 52-year-old lady with a left breast DCIS. This was screening detected. She underwent   lumpectomy. Surgical pathology showed nuclear grade 3 DCIS, comedo, cribriform, micropapillary, and solid types, with   microcalcifications. A residual of 3mm was found after a prior core biopsy, and no invasive carcinoma was identified. The   DCIS was greater than 5mm from all margins. The stage was pTis Nx Mx. DCIS was ER+, MO+.      She then presented with adjuvant radiation therapy. Her left breast was treated to 4240 cGy in 16 fractions with a boost of   1000 cGy in 4 fractions due to high grade.      She tolerate the treatment well with the expected dermatitis. Her skin regimen included Aqupahor, Vanicream, Eucerin   initially. After presenting with itching and desquamation, she was also provided with 2.5% hydrocortisone cream, Cool   Magic and Mepilex dressings.  ED visits/hospitalizations: None     Treatment Breaks: 2023 and 7/10/2023     Acute Toxicity Profile by CTC v5.0:  Fatigue: Grade 1: Fatigue relieved by rest  Dermatitis: Grade 1: Faint erythema or  dry desquamation  PAIN MANAGEMENT: Not Required.                             FOLLOW UP PLAN:   Follow-up with radiation oncology in 1 month  follow-up with Ms. Florinda Roque (genetic counselor) on 8/23/2023.   Follow-up with MUSA Mirza on 12/20/2023.                          Resident Physician: Connor Angela M.D.   Staff Physician: Cleo Campoverde M.D. PhD     CC: MD Luciano Ngo MD           Radiation Oncology:  [facility] [address] [place] [address]

## 2023-08-31 ENCOUNTER — OFFICE VISIT (OUTPATIENT)
Dept: RADIATION ONCOLOGY | Facility: CLINIC | Age: 53
End: 2023-08-31
Attending: NURSE PRACTITIONER
Payer: COMMERCIAL

## 2023-08-31 DIAGNOSIS — D05.12 DUCTAL CARCINOMA IN SITU (DCIS) OF LEFT BREAST: Primary | ICD-10-CM

## 2023-08-31 PROCEDURE — 99024 POSTOP FOLLOW-UP VISIT: CPT | Performed by: NURSE PRACTITIONER

## 2023-08-31 NOTE — PROGRESS NOTES
Radiation Oncology Follow-up Visit  2023        Luna Eugene  MRN: [id number]   : 1970     DISEASE TREATED:   DCIS of the left breast, grade 3, ER+    RADIATION THERAPY DELIVERED:   5240 cGy completed 2023    INTERVAL SINCE COMPLETION OF RADIATION THERAPY:   1 month    SUBJECTIVE:   Luna Eugene is a 52 year old female who is here today for routine follow up after completing radiation therapy.  She has seen healing of her skin reaction and continues to moisturize.  Doesn't notice any stiffness. Denies any pain or swelling. Fatigue has improved.  She is having some anxiety.  She made an appt with a genetic counselor but was in [facility] so the provider could not complete the visit and that was difficult for her as next available is December.  She has reached out to a therapist.  She does have a script for Lexapro but has not taken it.  She has seen medical oncology.    ROS:  Complete review of systems is negative except for symptoms discussed in subjective above.    Current Outpatient Medications   Medication    Cholecalciferol (VITAMIN D) 400 UNITS capsule    gabapentin (NEURONTIN) 300 MG capsule    hydrocortisone 2.5 % cream    LACTOBACILLUS PO    LORazepam (ATIVAN) 0.5 MG tablet    MULTI-VITAMIN OR TABS    tamoxifen (NOLVADEX) 10 MG tablet    tretinoin (RETIN-A) 0.05 % external cream    valACYclovir (VALTREX) 1000 mg tablet     No current facility-administered medications for this visit.          Allergies   Allergen Reactions    No Known Allergies        Past Medical History:   Diagnosis Date    Allergic rhinitis     Anxiety     Breast cancer (H) 2023    Classic migraines     Fibromyalgia     GERD (gastroesophageal reflux disease)     Insomnia     Irritable bowel syndrome     Kidney stones     Panic disorder without agoraphobia          PHYSICAL EXAM:  LMP 10/24/2007   Gen: Alert, in NAD  Left breast with hyperpigmentation.  No erythema.  Healing well.  No lymphedema  noted.  Psychiatric: Appropriate mood and affect      LABS AND IMAGING:  Reviewed.    IMPRESSION:   Ms. Eugene is a 52 year old female with a left breast cancer s/p 1 month out from radiation and doing well.    PLAN:   Patient has recovered nicely from acute side effects of radiation therapy.  Discussed long term care with continued moisturizing of the treated breast, stretching and range of motion exercises, and sun screen.  Patient will follow up with medical oncology for continued care and imaging.    Discussed to come in or call with any concerns or questions that may develop.      Brittany Tyson NP  Radiation Oncology  [facility]

## 2023-08-31 NOTE — LETTER
2023         RE: Luna Eugene  [address]  [place] [address]        Dear Colleague,    Thank you for referring your patient, Luna Eugene, to the [facility] RADIATION ONCOLOGY. Please see a copy of my visit note below.    Radiation Oncology Follow-up Visit  2023        Luna Eugene  MRN: [id number]   : 1970     DISEASE TREATED:   DCIS of the left breast, grade 3, ER+    RADIATION THERAPY DELIVERED:   5240 cGy completed 2023    INTERVAL SINCE COMPLETION OF RADIATION THERAPY:   1 month    SUBJECTIVE:   Luna Eugene is a 52 year old female who is here today for routine follow up after completing radiation therapy.  She has seen healing of her skin reaction and continues to moisturize.  Doesn't notice any stiffness. Denies any pain or swelling. Fatigue has improved.  She is having some anxiety.  She made an appt with a genetic counselor but was in [facility] so the provider could not complete the visit and that was difficult for her as next available is December.  She has reached out to a therapist.  She does have a script for Lexapro but has not taken it.  She has seen medical oncology.    ROS:  Complete review of systems is negative except for symptoms discussed in subjective above.    Current Outpatient Medications   Medication    Cholecalciferol (VITAMIN D) 400 UNITS capsule    gabapentin (NEURONTIN) 300 MG capsule    hydrocortisone 2.5 % cream    LACTOBACILLUS PO    LORazepam (ATIVAN) 0.5 MG tablet    MULTI-VITAMIN OR TABS    tamoxifen (NOLVADEX) 10 MG tablet    tretinoin (RETIN-A) 0.05 % external cream    valACYclovir (VALTREX) 1000 mg tablet     No current facility-administered medications for this visit.          Allergies   Allergen Reactions    No Known Allergies        Past Medical History:   Diagnosis Date    Allergic rhinitis     Anxiety     Breast cancer (H) 2023    Classic migraines     Fibromyalgia     GERD (gastroesophageal reflux  disease)     Insomnia     Irritable bowel syndrome     Kidney stones     Panic disorder without agoraphobia          PHYSICAL EXAM:  LMP 10/24/2007   Gen: Alert, in NAD  Left breast with hyperpigmentation.  No erythema.  Healing well.  No lymphedema noted.  Psychiatric: Appropriate mood and affect      LABS AND IMAGING:  Reviewed.    IMPRESSION:   Ms. Eugene is a 52 year old female with a left breast cancer s/p 1 month out from radiation and doing well.    PLAN:   Patient has recovered nicely from acute side effects of radiation therapy.  Discussed long term care with continued moisturizing of the treated breast, stretching and range of motion exercises, and sun screen.  Patient will follow up with medical oncology for continued care and imaging.    Discussed to come in or call with any concerns or questions that may develop.      Brittany Tyson NP  Radiation Oncology  [facility]

## 2023-09-12 ENCOUNTER — VIRTUAL VISIT (OUTPATIENT)
Dept: PALLIATIVE CARE | Facility: CLINIC | Age: 53
End: 2023-09-12
Attending: STUDENT IN AN ORGANIZED HEALTH CARE EDUCATION/TRAINING PROGRAM
Payer: COMMERCIAL

## 2023-09-12 VITALS — BODY MASS INDEX: 23.63 KG/M2 | WEIGHT: 147 LBS | HEIGHT: 66 IN

## 2023-09-12 DIAGNOSIS — G47.09 OTHER INSOMNIA: ICD-10-CM

## 2023-09-12 DIAGNOSIS — F41.9 ANXIETY: ICD-10-CM

## 2023-09-12 DIAGNOSIS — D05.12 DUCTAL CARCINOMA IN SITU (DCIS) OF LEFT BREAST: Primary | ICD-10-CM

## 2023-09-12 DIAGNOSIS — K59.09 OTHER CONSTIPATION: ICD-10-CM

## 2023-09-12 DIAGNOSIS — K21.9 GASTROESOPHAGEAL REFLUX DISEASE WITHOUT ESOPHAGITIS: ICD-10-CM

## 2023-09-12 DIAGNOSIS — Z71.89 ADVANCED CARE PLANNING/COUNSELING DISCUSSION: ICD-10-CM

## 2023-09-12 DIAGNOSIS — Z51.5 ENCOUNTER FOR PALLIATIVE CARE: ICD-10-CM

## 2023-09-12 PROCEDURE — 99417 PROLNG OP E/M EACH 15 MIN: CPT | Mod: VID | Performed by: STUDENT IN AN ORGANIZED HEALTH CARE EDUCATION/TRAINING PROGRAM

## 2023-09-12 PROCEDURE — 99205 OFFICE O/P NEW HI 60 MIN: CPT | Mod: VID | Performed by: STUDENT IN AN ORGANIZED HEALTH CARE EDUCATION/TRAINING PROGRAM

## 2023-09-12 RX ORDER — MAGNESIUM GLYCINATE 100 MG
400 CAPSULE ORAL DAILY
COMMUNITY
Start: 2023-09-12

## 2023-09-12 NOTE — NURSING NOTE
Is the patient currently in the state of [place]? YES    Visit mode:VIDEO    If the visit is dropped, the patient can be reconnected by: TELEPHONE VISIT: Phone number: [phone]    Will anyone else be joining the visit? NO  (If patient encounters technical issues they should call [phone]    How would you like to obtain your AVS? MyChart    Are changes needed to the allergy or medication list? No    Reason for visit: Consult (Video visit )    Jessica THAKKAR

## 2023-09-12 NOTE — PATIENT INSTRUCTIONS
"Recommendations:  -We discussed starting the Lexapro and 1/2 tablet daily.  In order to reduce the risk of exacerbating your anxiety, I would recommend waiting to increase to a full tablet for 2 weeks.  -We talked about the QT interval on the EKG and my recommendation to recheck 1 of these 4 to 6 weeks after you have been taking the Lexapro at the higher dose.  If you need me to order this test, I am happy to do so.  -I placed a referral to our palliative care clinical social workers for assistance with emotional/processing support.  -We talked about adjustments to your bowel regimen to hopefully keep things moving a little bit smoother.  Discussed doing partial doses of MiraLAX and adding in senna (either in tablet form or drunk as a tea-smooth move brand).  -We talked about starting to look at completing a healthcare directive.  I recommend you check out- [email or web address]     They have several tools and some advice about getting started.  Under the \"How do I document my choices\" section, I am a fan of the full length healthcare directive (for adults with serious illness) because I think it has a good combination of the medical questions that are important as well as the social and personal questions that allow healthcare providers to get to know you as a person if you are unable to speak for yourself.  You do not have to complete every question on the document.  I generally recommend patients start with 3 or 4 questions on the goals page and work from there.    There is also a Short Form, which primarily serves to designate health care agents. These are people who can speak on your behalf about your healthcare wishes/goals if you are not able to speak for yourself.      Follow up: 2 months      Reasons to Call    If you are having worsening/uncontrolled symptoms we want you to call!    You or your other physicians make any changes to medications we " have prescribed.  -Please call for refills 4-5 days before you will run out of medication.    Important Phone Numbers, including: Refills, scheduling, and general questions     Palliative Care RN: Faustina Faith - [phone]  *After hours or on weekends. Will connect you with on call MD. [phone]

## 2023-09-12 NOTE — LETTER
"9/12/2023       RE: Luna Eugene  [address]  [place] [address]     Dear Colleague,    [facility] you [facility] your patient, Luna Eugene[facility]. Please see a copy of my visit note below.    Palliative Care Clinic Consult Note    Patient Name: Luna Eugene  Primary Provider: Ale Smith    Chief Complaint/Patient ID: Luna Eugene is a 52 year old female with PMHx of DCIS, s/p partial mastectomy and localized RT. On tamoxifen.  -Worsening anxiety in setting of cancer dx/treatment.     Reviewed: Yes    History of Present Illness:  Luna Eugene is a 52 year old female who is seen for a new consult via Video visit today with Palliative Care.      Reviewed PCP note from 8/21.  Started venlafaxine for hot flashes and trazodone as needed for insomnia.  Recommended 2-week trial of either Pepcid or omeprazole for reflux symptoms.    Endorses a years long history of anxiety dating back to childhood, however she has been on treatment with medications since college.  Previously was on Zoloft for the longest amount of time, however was told this would interact more with her tamoxifen.  She also has previously followed with therapist, however has not seen one in the recent past.    Anxiety has recently flared up since completing her surgery and radiation therapy for her breast cancer.  Notices it is affecting her sleep as well as her gut.  She has tried different forms of meditation as well as grounding.  She tries to walk 3 miles several times a week.  She tried the trazodone that was prescribed, however this made her feel \"weird\" so she stopped taking it.  She previously had been on Effexor and had such significant withdrawal symptoms when weaning off of it that she decided she did not want to start it again.  She has a prescription for Lexapro 10 mg which she has not started it yet as " "she wanted to talk to me about it.  She says she feels \"like I constantly have a big exam that I am preparing for\".  She also takes melatonin 10 mg nightly and has added in 400 mg of magnesium glycinate to her regimen.    She also has hot flashes, which predated her cancer diagnosis and add an added level of difficulty to sleep.    Bowels have tended to be more sluggish, and this sometimes can be an added source of anxiety.  She has increased her fiber intake.  She had 2 days where she took a full dose of MiraLAX in a row, and that caused significant diarrhea, so she has been hesitant to retry it.    Her heartburn symptoms improved after 2 weeks of consistently taking Pepcid.  She is currently not taking anything now and feels good.  She has a longstanding history of GERD due to a relax esophageal sphincter.     Social History:  .  2 daughters.  Has a dog.  (currently third grade)..  Social History     Tobacco Use    Smoking status: Never     Passive exposure: Never    Smokeless tobacco: Never   Substance Use Topics    Alcohol use: Yes     Comment: socially    Drug use: No     Family History- Reviewed in Epic.     Allergies   Allergen Reactions    No Known Allergies      Advanced Care Planning: Has not completed.  and daughters would be HCAs.    Medications- Reviewed in Epic.    Past Medical History- Reviewed in [employer].    Past Surgical History- Reviewed in Epic.    Review of Systems:   ROS: 10 point ROS neg other than the symptoms noted above in the HPI.    Physical Exam:   Constitutional: Alert, pleasant, no apparent distress. Sitting up in chair.  Eyes: Sclera non-icteric, no eye discharge.  ENT: No nasal discharge. Ears grossly normal.  Respiratory: Unlabored respirations. Speaking in full sentences.  Musculoskeletal: Extremities appear normal- no gross deformities noted. No edema noted on upper body.   Skin: No suspicious lesions or rashes on visible skin.  Neurologic: Clear " speech, no aphasia. No facial droop.  Psychiatric: Mentation appears normal, appropriate attention. Affect normal/bright. Does not appear anxious or depressed.      Key Data Reviewed:  LABS: 9/27/22- Cr 0.72, GFR >60.  8/29/23- WBC 6.2, Hgb 13.5, Plts 267.     EKG 5/15/23- Vent Rate 69 BPM, QTc 450ms.    Impression & Recommendations & Counseling:  Luna Eugene is a 52 year old female with history of DCIS, s/p partial mastectomy and localized RT.     Introduced the role of palliative care as an interdisciplinary team that cares for patients with serious illness to help support symptom management, communication, coping for patients and their families as well as support with medical decision making.    Anxiety - Longstanding history of anxiety worsened after going through cancer treatment.  Has been on medications in the past and worked with therapist.  Sertraline worked well, however stronger interaction with tamoxifen.  -Agree with starting Lexapro.  I would suggest 5 mg daily for 2 weeks due to her fairly significant anxiety and then increase to 10 mg.  If she feels completely comfortable after the 1 week at the 5 mg, she can go ahead and do the increase.  I just worry about acutely exacerbating her anxiety.  -There is a risk of QT prolongation with SSRIs and tamoxifen.  She had an EKG done May 2023 with a QTc of 450 ms.  Discussed recommendation to repeat EKG 4 to 6 weeks after being on the 10 mg of Lexapro.      Bowels - Sensation of constipation and incomplete emptying of her bowels.  Seems to be associated with anxiety and waking her up at night as well.  -Discussed that she can use half doses or even quarter doses of MiraLAX if a full dose is too much.  -Advised adding in senna to her regimen, such as taking 1 tablet daily as needed or drinking a cup of smooth move tea.  -Hoping bowel function may improve with anxiety treatment as well.    Insomnia - Suspect this is heavily related to both the anxiety and  bowel issues.  Discussed rationale for addressing those 2 symptoms first and then reassessing how things are with her sleep in a couple months.  She agreed with this.    GERD - Improved after 2-week trial of Pepcid.  No longer needing medication.  -Discussed that she can resume Pepcid if needed.    ACP - Has not completed in [facility].   and daughters would be HCA's.  -[place] 9/12/2023.    Follow up: 2 months    Total time spent on day of encounter is 98 mins, including reviewing record, review of above studies, above visit with patient, symptomatic discussion as above, including medication adjustments/prescription management, and documentation.       Some chart documentation performed using Dragon Voice recognition Software. Although reviewed after completion, some words and grammatical errors may remain.        Again, thank you for allowing me to participate in the care of your patient.      Sincerely,    Elizabeth Wood, DO

## 2023-09-12 NOTE — PROGRESS NOTES
"Palliative Care Clinic Consult Note    Patient Name: Luna Eugene  Primary Provider: Ale Smith    Chief Complaint/Patient ID: Luna Eugene is a 52 year old female with PMHx of DCIS, s/p partial mastectomy and localized RT. On tamoxifen.  -Worsening anxiety in setting of cancer dx/treatment.     Reviewed: Yes    History of Present Illness:  Luna Eugene is a 52 year old female who is seen for a new consult via Video visit today with Palliative Care.      Reviewed PCP note from 8/21.  Started venlafaxine for hot flashes and trazodone as needed for insomnia.  Recommended 2-week trial of either Pepcid or omeprazole for reflux symptoms.    Endorses a years long history of anxiety dating back to childhood, however she has been on treatment with medications since college.  Previously was on Zoloft for the longest amount of time, however was told this would interact more with her tamoxifen.  She also has previously followed with therapist, however has not seen one in the recent past.    Anxiety has recently flared up since completing her surgery and radiation therapy for her breast cancer.  Notices it is affecting her sleep as well as her gut.  She has tried different forms of meditation as well as grounding.  She tries to walk 3 miles several times a week.  She tried the trazodone that was prescribed, however this made her feel \"weird\" so she stopped taking it.  She previously had been on Effexor and had such significant withdrawal symptoms when weaning off of it that she decided she did not want to start it again.  She has a prescription for Lexapro 10 mg which she has not started it yet as she wanted to talk to me about it.  She says she feels \"like I constantly have a big exam that I am preparing for\".  She also takes melatonin 10 mg nightly and has added in 400 mg of magnesium glycinate to her regimen.    She also has hot flashes, which predated her cancer diagnosis and add an added level of " difficulty to sleep.    Bowels have tended to be more sluggish, and this sometimes can be an added source of anxiety.  She has increased her fiber intake.  She had 2 days where she took a full dose of MiraLAX in a row, and that caused significant diarrhea, so she has been hesitant to retry it.    Her heartburn symptoms improved after 2 weeks of consistently taking Pepcid.  She is currently not taking anything now and feels good.  She has a longstanding history of GERD due to a relax esophageal sphincter.     Social History:  .  2 daughters.  Has a dog.  (currently third grade)..  Social History     Tobacco Use    Smoking status: Never     Passive exposure: Never    Smokeless tobacco: Never   Substance Use Topics    Alcohol use: Yes     Comment: socially    Drug use: No     Family History- Reviewed in Epic.     Allergies   Allergen Reactions    No Known Allergies      Advanced Care Planning: Has not completed.  and daughters would be HCAs.    Medications- Reviewed in Epic.    Past Medical History- Reviewed in [facility].    Past Surgical History- Reviewed in Epic.    Review of Systems:   ROS: 10 point ROS neg other than the symptoms noted above in the HPI.    Physical Exam:   Constitutional: Alert, pleasant, no apparent distress. Sitting up in chair.  Eyes: Sclera non-icteric, no eye discharge.  ENT: No nasal discharge. Ears grossly normal.  Respiratory: Unlabored respirations. Speaking in full sentences.  Musculoskeletal: Extremities appear normal- no gross deformities noted. No edema noted on upper body.   Skin: No suspicious lesions or rashes on visible skin.  Neurologic: Clear speech, no aphasia. No facial droop.  Psychiatric: Mentation appears normal, appropriate attention. Affect normal/bright. Does not appear anxious or depressed.      Key Data Reviewed:  LABS: 9/27/22- Cr 0.72, GFR >60.  8/29/23- WBC 6.2, Hgb 13.5, Plts 267.     EKG 5/15/23- Vent Rate 69 BPM, QTc  450ms.    Impression & Recommendations & Counseling:  Luna Eugene is a 52 year old female with history of DCIS, s/p partial mastectomy and localized RT.     Introduced the role of palliative care as an interdisciplinary team that cares for patients with serious illness to help support symptom management, communication, coping for patients and their families as well as support with medical decision making.    Anxiety - Longstanding history of anxiety worsened after going through cancer treatment.  Has been on medications in the past and worked with therapist.  Sertraline worked well, however stronger interaction with tamoxifen.  -Agree with starting Lexapro.  I would suggest 5 mg daily for 2 weeks due to her fairly significant anxiety and then increase to 10 mg.  If she feels completely comfortable after the 1 week at the 5 mg, she can go ahead and do the increase.  I just worry about acutely exacerbating her anxiety.  -There is a risk of QT prolongation with SSRIs and tamoxifen.  She had an EKG done May 2023 with a QTc of 450 ms.  Discussed recommendation to repeat EKG 4 to 6 weeks after being on the 10 mg of Lexapro.      Bowels - Sensation of constipation and incomplete emptying of her bowels.  Seems to be associated with anxiety and waking her up at night as well.  -Discussed that she can use half doses or even quarter doses of MiraLAX if a full dose is too much.  -Advised adding in senna to her regimen, such as taking 1 tablet daily as needed or drinking a cup of smooth move tea.  -Hoping bowel function may improve with anxiety treatment as well.    Insomnia - Suspect this is heavily related to both the anxiety and bowel issues.  Discussed rationale for addressing those 2 symptoms first and then reassessing how things are with her sleep in a couple months.  She agreed with this.    GERD - Improved after 2-week trial of Pepcid.  No longer needing medication.  -Discussed that she can resume Pepcid if  needed.    ACP - Has not completed in [facility].   and daughters would be HCA's.  -Provided [place] hon[place] choices info 9[place]12/2023.    Follow up: 2 months    Video-Visit Details  Video Start Time: 2:24 PM  Video End Time: 3:34 PM    Originating Location (pt. Location): Home     Distant Location (provider location):  Offsite- Personal Home     Platform used for Video Visit: Ricardo     Total time spent on day of encounter is 98 mins, including reviewing record, review of above studies, above visit with patient, symptomatic discussion as above, including medication adjustments/prescription management, and documentation.     Elizabeth Wood, DO  Palliative Medicine   [id number]    Some chart documentation performed using Dragon Voice recognition Software. Although reviewed after completion, some words and grammatical errors may remain.

## 2023-09-22 DIAGNOSIS — F41.9 ANXIETY: Primary | ICD-10-CM

## 2023-09-22 RX ORDER — HYDROXYZINE PAMOATE 25 MG/1
25-50 CAPSULE ORAL 3 TIMES DAILY PRN
Qty: 90 CAPSULE | Refills: 1 | Status: SHIPPED | OUTPATIENT
Start: 2023-09-22

## 2023-09-22 RX ORDER — MIRTAZAPINE 7.5 MG/1
7.5 TABLET, FILM COATED ORAL AT BEDTIME
Qty: 30 TABLET | Refills: 1 | Status: SHIPPED | OUTPATIENT
Start: 2023-09-22 | End: 2023-11-10

## 2023-09-22 NOTE — TELEPHONE ENCOUNTER
Patient sent an update that she is not tolerating the lexapro and has significant nausea. Medication will be stopped and removed from her med list. Dr. Wood will send in new orders for remeron and vistaril. Pt has an appt with her PCP in October and will schedule an EKG after she's been regularly taking the remeron for at least 4 weeks.    AMBER Cortes, RN  Palliative Care Nurse Clinician    [phone] (Direct)  [phone] (Main)  [phone] (Appointment Scheduling)

## 2023-09-28 ENCOUNTER — VIRTUAL VISIT (OUTPATIENT)
Dept: PALLIATIVE CARE | Facility: CLINIC | Age: 53
End: 2023-09-28
Attending: SOCIAL WORKER
Payer: COMMERCIAL

## 2023-09-28 VITALS
SYSTOLIC BLOOD PRESSURE: 111 MMHG | HEIGHT: 66 IN | WEIGHT: 142 LBS | DIASTOLIC BLOOD PRESSURE: 60 MMHG | BODY MASS INDEX: 22.82 KG/M2

## 2023-09-28 DIAGNOSIS — F41.1 GENERALIZED ANXIETY DISORDER: Primary | ICD-10-CM

## 2023-09-28 PROCEDURE — 90791 PSYCH DIAGNOSTIC EVALUATION: CPT | Mod: VID | Performed by: SOCIAL WORKER

## 2023-09-28 ASSESSMENT — PAIN SCALES - GENERAL: PAINLEVEL: NO PAIN (0)

## 2023-09-28 NOTE — LETTER
9/28/2023       RE: Luna Eugene  [address]  [place] [address]       Dear Colleague,    Thank you for referring your patient, Luna Eugene, to the [facility] at [facility]. Please see a copy of my visit note below.    Palliative Care Counseling Services - Initial Assessment    PLEASE NOTE:  THIS IS A MENTAL HEALTH NOTE.  OTHER PROVIDERS VIEWING THIS NOTE SHOULD USE THIS ONLY FOR UNDERSTANDING THE CONTEXT OF THE PATIENT S EXPERIENCE.  TOPICS DESCRIBED IN THIS NOTE SHOULD NOT BE REFERENCED TO THE PATIENT BY MEDICAL PROVIDERS  Luna is a  52 year old female with history of DCIS, s/p partial mastectomy and localized RT. Seen today for initial palliative care counseling assessment at via [employer] video visit.    Referred by: Dr. Annamaria Cort[facility]    Presenting Issues: Anxiety    Preferred Name: Luna    Mental Status Exam: (List all that apply)      Appearance: Appropriate      Eye Contact: Good       Orientation: Yes, x4      Mood: Normal      Affect: Appropriate      Thought Content: Clear         Thought Form: Logical      Psychomotor Behavior: Normal    Family:       Marital status:     Years : 30       Name of spouse/partner: Vinnie 2 adult daughters -   Dog/Janett      Children: 2 adult children.       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      Spouses/SO current employment status: Employed full time      Kind of work:     Education highest level: College    Financial:       Descriptor: Comfortable      Health insurance:  BCBS    Legal concerns: No       Area(s) of concern: Not  applicable    Health Care Directive: Has one:  No       If yes, copy in EMR: No       Basic information regarding health care directive provided: Yes        Health Care Agent(s): No health care directive:  Surrogate  health care decision maker is per legal succession  POLST? N    Medical History/Issues (patient account): Routine mammogram in 4/2023 with needing increased investigation. Diagnosed with DCIS, s/p partial mastectomy and localized RT. On tamoxifen.     Pain/Discomfort Issues: None     Coping: Slept better last night - feels coping better with anxiety. Having extreme symptoms.    Sleep: new anxiety medications - slept so much better since last Friday.     Sexual Health/Intimacy: not explored    Mental Health History and Current Review of Symptoms (patient account):     Depression in past?: No    Treatment in past?:   No  Treatment currently?:  No    Depression symptoms currently?:  - Anhedonia:  No  - Hopeless or down mood:  No  - Sleep problems (too much or too little):  No  - Fatigue:  No  - Appetite (too much or too little):  No  - Excessively negative self perception:  No  - Trouble concentrating:  No  - Motor slowness:  No  - Current and/or recent thoughts of suicide:  No    Suicide risk screen:  - Past thoughts of suicide?  No  - Past attempts to end own life?  No  - If yes, how? n  - Protective factors currently? N/a  - Safety plan needed currently? N/a    Anxiety in past?: Yes   Treatment in past?  Yes - past therapy - connected with PCSW and also mindfulness therapist.  Treatment currently?  Yes medication    Anxiety symptoms currently?  - Nervous, on edge:  No  - Sleep problems:  Yes- but past few days better  - Worrying a lot/hard to manage worries:  Yes  Specific recent areas of worry/fear/concern: cancer and not being able to sleep.  - Tense, hard to relax:  Yes  - Feeling restless, hard to sit still:  No  - Irritable:  No  - Feeling of dread/ afraid that something awful may happen:  No  -  "How long? N/A  - Impacts on daily life? No concerns.     Grief vs Depression:  Endorses symptoms for: Grief    Psychological Trauma and/or Major Losses:   None identified.    Safety Screen:  History of being harmed or controlled by someone close to you?  No  Being hurt or controlled by someone close to you?  No  Worried will be harmed in future?  No  Worried will harm someone else in future?  No    CD History:   Using alcohol actively? No    Amount per week:   Current concerns (self or other) about alcohol or drug use? No  Past concerns and/or CD treatment? No        Marie/Spirituality:       Belong to a marie community: Yes     Specify:  [facility] [employer]      Identify with a particular Buddhist: Suleiman      Identify as spiritual: Yes      How find expression: Prayer and Attending services    Hope:      What do you hope for:    \"I want to feel like myself again, to keep sleeping and find juan again\".       What gives you hope:  not asked today.    Internal Resources (positive memories, sources of juan): Spouse, adult children, parents, friends. 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     Perceived Needs: talking about cancer, tools to help with anxiety    Resource needs/Referrals: None  Luna is a  52 year old female with history of DCIS, s/p partial mastectomy and localized RT. Seen today for initial palliative care counseling assessment at via [facility] video visit. They were referred by Dr. Wood for coping with anxiety. In addition to their medical diagnosis also meets criteria for generalized anxiety disorder. Their symptoms include insomnia, feeling tense, hard time managing worries. These symptoms began to intensify this summer with completion of treatment of breast cancer and medication changes and the client has experienced functional impairment in her life- distress not wanting to be alone and needing to take time off work due to lack of sleep since beginning of August.  Luna " presents as a wife/mother/teacher, who finished breast cancer treatment. Their relationships are in tact. No cultural concerns identified. Other factors in her life of a death of an uncle in car accident in June 2023.Their medical condition has the potential to influence their mental health in the following ways: decrease as medication changes.   Other mental health diagnoses that were considered include: adjustment disorder. The symptoms related to these possible diagnoses can currently be explained by reports having lifelong anxiety however, managing treatment of breast cancer has disrupted and caused distress in her life daily.   It is medically necessary for this client to receive outpatient psychotherapy services at this time. If their current mental health symptoms go untreated it is likely decreased mood. My recommendations for services for this client include narrative therapy. The client's prognosis from a mental health perspective is good.    Intervention: Initial palliative care counseling / clinical social work evaluation was conducted.  Palliative Care Counseling interventions available were discussed, including counseling related to serious illness, behavioral interventions for symptom management, consultation regarding goals of care/health care directive/POLST, and other interventions specific to the patient's situation or concerns.     Plan: PCSW sent via email guided imagery 5 minute exercises.   Luna will meet with PCSW one more time and also had started care with mindfulness therapist.     DSM5 Diagnoses:   300.02 (F41.1) Generalized Anxiety Disorder    Time Spent with Patient/Family: 50 minutes  (Start 2:01p, end 2:50p)        DO NOT SEND ANY LETTERS       Again, thank you for allowing me to participate in the care of your patient.      Sincerely,    SPENSER Pineda

## 2023-09-28 NOTE — PROGRESS NOTES
Virtual Visit Details    Type of service:  Video Visit     Originating Location (pt. Location): Home    Distant Location (provider location):  Off-site  Platform used for Video Visit: [employer]  Palliative Care Counseling Services - Initial Assessment    PLEASE NOTE:  THIS IS A MENTAL HEALTH NOTE.  OTHER PROVIDERS VIEWING THIS NOTE SHOULD USE THIS ONLY FOR UNDERSTANDING THE CONTEXT OF THE PATIENT S EXPERIENCE.  TOPICS DESCRIBED IN THIS NOTE SHOULD NOT BE REFERENCED TO THE PATIENT BY MEDICAL PROVIDERS  Luna is a  52 year old female with history of DCIS, s/p partial mastectomy and localized RT. Seen today for initial palliative care counseling assessment at via [employer] video visit.    Referred by: Dr. Annamaria Cortez [facility]    Presenting Issues: Anxiety    Preferred Name: Luna    Mental Status Exam: (List all that apply)      Appearance: Appropriate      Eye Contact: Good       Orientation: Yes, x4      Mood: Normal      Affect: Appropriate      Thought Content: Clear         Thought Form: Logical      Psychomotor Behavior: Normal    Family:       Marital status:     Years : 30       Name of spouse/partner: Vinnie 2 adult daughters -   Dog/Janett      Children: 2 adult children.       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      Spouses/SO current employment status: Employed full time      Kind of work:     Education highest level: College    Financial:       Descriptor: Comfortable      Health insurance:  BCBS    Legal concerns: No       Area(s) of concern: Not applicable    Health Care Directive: Has one:  No       If yes, copy in EMR: No       Basic information regarding health care  directive provided: Yes        Health Care Agent(s): No health care directive:  Surrogate  health care decision maker is per legal succession  POLST? N    Medical History/Issues (patient account): Routine mammogram in 4/2023 with needing increased investigation. Diagnosed with DCIS, s/p partial mastectomy and localized RT. On tamoxifen.     Pain/Discomfort Issues: None     Coping: Slept better last night - feels coping better with anxiety. Having extreme symptoms.    Sleep: new anxiety medications - slept so much better since last Friday.     Sexual Health/Intimacy: not explored    Mental Health History and Current Review of Symptoms (patient account):     Depression in past?: No    Treatment in past?:   No  Treatment currently?:  No    Depression symptoms currently?:  - Anhedonia:  No  - Hopeless or down mood:  No  - Sleep problems (too much or too little):  No  - Fatigue:  No  - Appetite (too much or too little):  No  - Excessively negative self perception:  No  - Trouble concentrating:  No  - Motor slowness:  No  - Current and/or recent thoughts of suicide:  No    Suicide risk screen:  - Past thoughts of suicide?  No  - Past attempts to end own life?  No  - If yes, how? n  - Protective factors currently? N/a  - Safety plan needed currently? N/a    Anxiety in past?: Yes   Treatment in past?  Yes - past therapy - connected with PCSW and also mindfulness therapist.  Treatment currently?  Yes medication    Anxiety symptoms currently?  - Nervous, on edge:  No  - Sleep problems:  Yes- but past few days better  - Worrying a lot/hard to manage worries:  Yes  Specific recent areas of worry/fear/concern: cancer and not being able to sleep.  - Tense, hard to relax:  Yes  - Feeling restless, hard to sit still:  No  - Irritable:  No  - Feeling of dread/ afraid that something awful may happen:  No  - How long? N/A  - Impacts on daily life? No concerns.     Grief vs Depression:  Endorses symptoms for: Grief    Psychological  "Trauma and/or Major Losses:   None identified.    Safety Screen:  History of being harmed or controlled by someone close to you?  No  Being hurt or controlled by someone close to you?  No  Worried will be harmed in future?  No  Worried will harm someone else in future?  No    CD History:   Using alcohol actively? No    Amount per week:   Current concerns (self or other) about alcohol or drug use? No  Past concerns and/or CD treatment? No        Marie/Spirituality:       Belong to a marie community: Yes     Specify:  [employer]      Identify with a particular Buddhism: Suleiman      Identify as spiritual: Yes      How find expression: Prayer and Attending services    Hope:      What do you hope for:    \"I want to feel like myself again, to keep sleeping and find juan again\".       What gives you hope:  not asked today.    Internal Resources (positive memories, sources of juan): Spouse, adult children, parents, friends. 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     Perceived Needs: talking about cancer, tools to help with anxiety    Resource needs/Referrals: None  Luna is a  52 year old female with history of DCIS, s/p partial mastectomy and localized RT. Seen today for initial palliative care counseling assessment at via [facility] video visit. They were referred by Dr. Wood for coping with anxiety. In addition to their medical diagnosis also meets criteria for generalized anxiety disorder. Their symptoms include insomnia, feeling tense, hard time managing worries. These symptoms began to intensify this summer with completion of treatment of breast cancer and medication changes and the client has experienced functional impairment in her life- distress not wanting to be alone and needing to take time off work due to lack of sleep since beginning of August.  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 Their relationships are in tact. No cultural concerns " identified. Other factors in her life of a death of an uncle in car accident in June 2023.Their medical condition has the potential to influence their mental health in the following ways: decrease as medication changes.   Other mental health diagnoses that were considered include: adjustment disorder. The symptoms related to these possible diagnoses can currently be explained by reports having lifelong anxiety however, managing treatment of breast cancer has disrupted and caused distress in her life daily.   It is medically necessary for this client to receive outpatient psychotherapy services at this time. If their current mental health symptoms go untreated it is likely decreased mood. My recommendations for services for this client include narrative therapy. The client's prognosis from a mental health perspective is good.    Intervention: Initial palliative care counseling / clinical social work evaluation was conducted.  Palliative Care Counseling interventions available were discussed, including counseling related to serious illness, behavioral interventions for symptom management, consultation regarding goals of care/health care directive/POLST, and other interventions specific to the patient's situation or concerns.     Plan: PCSW sent via email guided imagery 5 minute exercises.   Luna will meet with PCSW one more time and also had started care with mindfulness therapist.     DSM5 Diagnoses:   300.02 (F41.1) Generalized Anxiety Disorder    Time Spent with Patient/Family: 50 minutes  (Start 2:01p, end 2:50p)    EDOUARD Pineda, [facility]   Palliative Care    [phone]       DO NOT SEND ANY LETTERS

## 2023-09-28 NOTE — NURSING NOTE
01/23/23 1458   Post-Acute Status   Post-Acute Authorization Placement   Post-Acute Placement Status Referrals Sent     LTAC Referral sent.    Is the patient currently in the state of [place]? YES    Visit mode:VIDEO    If the visit is dropped, the patient can be reconnected by: VIDEO VISIT: Send to e-mail at: [email or web address]    Will anyone else be joining the visit? NO  (If patient encounters technical issues they should call [phone]    How would you like to obtain your AVS? MyChart    Are changes needed to the allergy or medication list? No    Reason for visit: Consult    Yesenia THAKKAR

## 2023-11-05 ENCOUNTER — HEALTH MAINTENANCE LETTER (OUTPATIENT)
Age: 53
End: 2023-11-05

## 2023-11-10 DIAGNOSIS — F41.9 ANXIETY: ICD-10-CM

## 2023-11-10 NOTE — TELEPHONE ENCOUNTER
"Received telephone call from patient requesting refill of mirtazapine. Pt reports she is seeing a psychotherapist who has been really helpful. She is feeling like she has too many appts and providers involved in her care for now and would like to cancel her scheduled visit on 11/14/23. She has a follow up visit planned with her PCP in December and will ask her to take over prescribing mirtazapine at that time. Discussed that we are available to visit with again if needed in the future. She had an EKG done at [place] and was told it is \"normal.\" She will try and get a copy for our records.    Last office visit: 9/12/23  Scheduled for follow up PRN     Will route request to MD for review.       "

## 2023-11-11 RX ORDER — MIRTAZAPINE 7.5 MG/1
7.5 TABLET, FILM COATED ORAL AT BEDTIME
Qty: 30 TABLET | Refills: 1 | Status: SHIPPED | OUTPATIENT
Start: 2023-11-11 | End: 2024-01-19

## 2023-12-18 NOTE — PROGRESS NOTES
NEW CONSULTATION   Dec 20, 2023     Luna Eguene is a 53 year old woman who presents with history of left breast ductal carcinoma in situ. She was referred by Dr. Haley.    HPI:    She had a screening mammogram in April, 2023 that demonstrated left breast calcifications. Biopsy demonstrated DCIS, ER positive, grade 3. She underwent a lumpectomy with Dr. Coon 5/25/23 followed by radiation. She had negative genetic testing. She met with Dr. Jones. She was started on Tamoxifen 5 mg every day 6/20/23 with the plan to complete 3 years. She is scheduled to see Genetic counseling this week.     Today she denies any breast changes or concerns including mass, skin change, nipple inversion or nipple discharge. Tolerating Tamoxifen well. She is sleeping better since being on Remeron. She has some right arm tightness with abduction but declines offer for physical therapy.     BREAST-SPECIFIC HISTORY:    Previous breast imaging: Yes  - 8/1/01  - 7/22/04 Smammo BI-RADS 1  - 8/15/06 Smammo BI-RADS 1  - 9/25/08 Smammo BI-RADS 1  - 9/14/11 Smammo BI-RADS 1  - 11/1/12 Smammo BI-RADS 1  - 11/21/13 Smammo BI-RADS 1  - 12/4/14 Smammo BI-RADS 1  - 12/16/15 Smammo right breast calcifications BI-RADS 0  - 1/15/16 right Dmammo BI-RADS 2  - 1/9/17 Smammo BI-RADS 1  - 1/24/18 Smammo BI-RADS 1  - 2/6/19 Smammo BI-RADS 1  - 2/20/20 Smammo BI-RADS 1  - 3/9/21 Smammo BI-RADS 2  - 4/12/22 Smammo BI-RADS 1  - 4/19/23 Smammo left breast 1:00 calcifications BI-RADS 0  - 5/1/23 left breast 1:00 stereotactic biopsy: DCIS grade 3, solid and cribiform type with comedonecrosis and calcifications, ER positive.   - 5/9/23 Cmammo: no enhancement BI-RAD 6  - 5/25/23 left lumpectomy with Dr. Coon: DCIS, grade 2, negative margins, ER positive    Prior breast biopsies/surgeries: Yes  - 1/1/97 right axillary lymph node removal  - 5/1/23 left breast 1:00 stereotactic biopsy [facility]: microinvasive lobular carcinoma, LCIS  DCIS grade 3, solid and cribiform type with  "comedonecrosis and calcifications, ER positive.   - 5/25/23 left lumpectomy with Dr. Coon: DCIS, grade 2, negative margins, ER positive    Prior history of breast cancer or DCIS: Yes  - 2023 DCIS, ER positive s/p lumpectomy, radiation, on low dose Tamoxifen  Prior radiation history: Yes   - 2023 for left breast DCIS  Self breast exams: Yes  Breast density: heterogeneously dense    FAMILY HISTORY:  Breast ca: No  Ovarian ca: No    PAST MEDICAL HISTORY  Past Medical History:   Diagnosis Date    Allergic rhinitis     Anxiety     Classic migraines     Ductal carcinoma in situ 05/2023    Fibromyalgia     GERD (gastroesophageal reflux disease)     Insomnia     Irritable bowel syndrome     Kidney stones     Panic disorder without agoraphobia      PAST SURGICAL HISTORY   Past Surgical History:   Procedure Laterality Date    COLONOSCOPY  8/16/06    WNL     ESOPHAGOSCOPY, DIAGNOSTIC  5/02    decreased tone at GE junction (Dr. Billings)    LUMPECTOMY BREAST Left 5/25/2023    Procedure: LEFT Radiofrequency Identification Seed-Localized Segmental Mastectomy (=\"Lumpectomy\");  Surgeon: Bernadine Coon MD;  [place] [facility] [place]    [facility] NONSPECIFIC PROCEDURE      [place] teeth removed    [facility] NONSPECIFIC PROCEDURE  1997    Lymph node removal right axilla    [facility] NONSPECIFIC PROCEDURE  1991    Colposcopy    [facility] UGI ENDOSCOPY, SIMPLE EXAM  4/28/06    WNL     MEDICATIONS  Current Outpatient Medications   Medication Sig Dispense Refill    Cholecalciferol (VITAMIN D) 400 UNITS capsule Take 1 capsule by mouth daily.      gabapentin (NEURONTIN) 300 MG capsule Take 1 capsule (300 mg) by mouth 3 times daily 60 capsule 1    hydrocortisone 2.5 % cream Apply topically 2 times daily 30 g 1    hydrOXYzine (VISTARIL) 25 MG capsule Take 1-2 capsules (25-50 mg) by mouth 3 times daily as needed for anxiety 90 capsule 1    LACTOBACILLUS PO       LORazepam (ATIVAN) 0.5 MG tablet Take 1 tablet (0.5 mg) by mouth 2 times daily as needed (Panic attack) 30 " tablet 0    Magnesium Glycinate 100 MG CAPS Take 400 mg by mouth daily      mirtazapine (REMERON) 7.5 MG tablet Take 1 tablet (7.5 mg) by mouth at bedtime 30 tablet 1    MULTI-VITAMIN OR TABS Take 1 tablet by mouth daily  0    tamoxifen (NOLVADEX) 10 MG tablet Take 0.5 tablets (5 mg) by mouth daily 60 tablet 3    tretinoin (RETIN-A) 0.05 % external cream Apply topically At Bedtime      valACYclovir (VALTREX) 1000 mg tablet Take 2 tablets (2,000 mg) by mouth 2 times daily (Patient not taking: Reported on 6/20/2023) 4 tablet 11      ALLERGIES  Allergies   Allergen Reactions    No Known Allergies       SOCIAL HISTORY:  Smokes: No  EtOH: Yes, occasionally   Exercise: active   Teacher, seen with     ROS:   Change in vision No  Headaches: no  Respiratory: No shortness of breath, dyspnea on exertion, cough, or hemoptysis   Cardiovascular: negative   Gastrointestinal: negative Abdominal pain: no  Breast: negative  Musculoskeletal: negative Joint pain No Back pain: no  Psychiatric: negative  Hematologic/Lymphatic/Immunologic: negative  Endocrine: negative    EXAM  /72 (BP Location: Right arm, Patient Position: Sitting, Cuff Size: Adult Regular)   Pulse 92   Temp 97.7  F (36.5  C) (Oral)   Resp 20   Wt 65.2 kg (143 lb 11.2 oz)   LMP 10/24/2007   SpO2 99%   BMI 23.02 kg/m     PHYSICAL EXAM  Respiratory: breathing non labored.   Breasts: Examination was done in both the upright and supine positions.  No masses noted. No skin or nipple changes. No nipple discharge. Right axillary scar. Left lateral breast scar healed.   No clavicular, cervical, or axillary lymphadenopathy.     INVESTIGATIONS:    12/20/23 left diagnostic mammogram: per Radiology no concerning findings final report pending.     ASSESSMENT/PLAN:    Luna Eugene is a 53 year old woman with history of left breast ductal carcinoma in situ s/p lumpectomy and radiation, now on Tamoxifen 5 mg. Left diagnotic mammogram today without concerning  findings.     1) DCIS  Surveillance: History and physical examination every 6-12 months for 5 years, then annually. Mammogram every 12 months.  - Screening mammogram with same day clinic visit with Dr. Jones due: 6/2024    2) Endocrine therapy   The contraindications for Tamoxifen (history of deep vein thrombosis or pulmonary embolism, history or stroke, history of TIA, known inherited clotting trait, pregnancy, and potential pregnancy without an effective nonhormal method of method of contraception) along with potential side effects (hot flashes, deep vein thrombosis, pulmonary embolism, stroke, cataracts, and endometrial cancer including signs and symptoms were discussed.   - Tamoxifen 5 mg daily for 3 years, started 6/20/23  - Baseline gynecological examination recommended. Routine age appropriate gynecologic screening.      3) Lifestyle Modifications were provided.   - Maintain your best healthy weight. Higher body fat and adult weight gain is associated with increased risk for breast cancer. This increase in risk has been attributed to increase in circulating endogenous estrogen levels from fat tissue.   - Any alcohol intake increases the risk for breast cancer. If you choose to drink alcohol limit alcohol consumption to less than 1 drink (1 ounce of liquor, 6 ounces of wine, or 8 ounces of beer) per day or less than 3 drinks per week.  - Be active daily and void being sedentary.   - Vitamin D may decrease the risk of developing breast cancer.     Tamera Mirza PA-C    20 minutes spent on the date of the encounter doing chart review, review of test results, interpretation of tests, patient visit and documentation.

## 2023-12-20 ENCOUNTER — ANCILLARY PROCEDURE (OUTPATIENT)
Dept: MAMMOGRAPHY | Facility: CLINIC | Age: 53
End: 2023-12-20
Attending: PHYSICIAN ASSISTANT
Payer: COMMERCIAL

## 2023-12-20 ENCOUNTER — ONCOLOGY VISIT (OUTPATIENT)
Dept: SURGERY | Facility: CLINIC | Age: 53
End: 2023-12-20
Attending: PHYSICIAN ASSISTANT
Payer: COMMERCIAL

## 2023-12-20 VITALS
OXYGEN SATURATION: 99 % | SYSTOLIC BLOOD PRESSURE: 105 MMHG | WEIGHT: 143.7 LBS | HEART RATE: 92 BPM | DIASTOLIC BLOOD PRESSURE: 72 MMHG | RESPIRATION RATE: 20 BRPM | TEMPERATURE: 97.7 F | BODY MASS INDEX: 23.02 KG/M2

## 2023-12-20 DIAGNOSIS — D05.12 DUCTAL CARCINOMA IN SITU (DCIS) OF LEFT BREAST: ICD-10-CM

## 2023-12-20 DIAGNOSIS — D05.12 DUCTAL CARCINOMA IN SITU (DCIS) OF LEFT BREAST: Primary | ICD-10-CM

## 2023-12-20 PROCEDURE — 99213 OFFICE O/P EST LOW 20 MIN: CPT | Performed by: PHYSICIAN ASSISTANT

## 2023-12-20 PROCEDURE — 77065 DX MAMMO INCL CAD UNI: CPT | Mod: LT | Performed by: RADIOLOGY

## 2023-12-20 PROCEDURE — G0279 TOMOSYNTHESIS, MAMMO: HCPCS | Performed by: RADIOLOGY

## 2023-12-20 ASSESSMENT — PAIN SCALES - GENERAL: PAINLEVEL: NO PAIN (0)

## 2023-12-20 NOTE — LETTER
12/20/2023         RE: Luna Eugene  [address] [place] [address]        Dear Colleague,    Thank you for referring your patient, Luna Eugene, to the [facility]. Please see a copy of my visit note below.    NEW CONSULTATION   Dec 20, 2023     Luna Eugene is a 53 year old woman who presents with history of left breast ductal carcinoma in situ. She was referred by Dr. Haley.    HPI:    She had a screening mammogram in April, 2023 that demonstrated left breast calcifications. Biopsy demonstrated DCIS, ER positive, grade 3. She underwent a lumpectomy with Dr. Coon 5/25/23 followed by radiation. She had negative genetic testing. She met with Dr. Jones. She was started on Tamoxifen 5 mg every day 6/20/23 with the plan to complete 3 years. She is scheduled to see Genetic counseling this week.     Today she denies any breast changes or concerns including mass, skin change, nipple inversion or nipple discharge. Tolerating Tamoxifen well. She is sleeping better since being on Remeron. She has some right arm tightness with abduction but declines offer for physical therapy.     BREAST-SPECIFIC HISTORY:    Previous breast imaging: Yes  - 8/1/01  - 7/22/04 Smammo BI-RADS 1  - 8/15/06 Smammo BI-RADS 1  - 9/25/08 Smammo BI-RADS 1  - 9/14/11 Smammo BI-RADS 1  - 11/1/12 Smammo BI-RADS 1  - 11/21/13 Smammo BI-RADS 1  - 12/4/14 Smammo BI-RADS 1  - 12/16/15 Smammo right breast calcifications BI-RADS 0  - 1/15/16 right Dmammo BI-RADS 2  - 1/9/17 Smammo BI-RADS 1  - 1/24/18 Smammo BI-RADS 1  - 2/6/19 Smammo BI-RADS 1  - 2/20/20 Smammo BI-RADS 1  - 3/9/21 Smammo BI-RADS 2  - 4/12/22 Smammo BI-RADS 1  - 4/19/23 Smammo left breast 1:00 calcifications BI-RADS 0  - 5/1/23 left breast 1:00 stereotactic biopsy: DCIS grade 3, solid and cribiform type with comedonecrosis and calcifications, ER positive.   - 5/9/23 Cmammo: no enhancement BI-RAD 6  - 5/25/23 left lumpectomy with Dr. Coon: DCIS,  "grade 2, negative margins, ER positive    Prior breast biopsies/surgeries: Yes  - 1/1/97 right axillary lymph node removal  - 5/1/23 left breast 1:00 stereotactic biopsy [place]: microinvasive lobular carcinoma, LCIS  DCIS grade 3, solid and cribiform type with comedonecrosis and calcifications, ER positive.   - 5/25/23 left lumpectomy with Dr. Coon: DCIS, grade 2, negative margins, ER positive    Prior history of breast cancer or DCIS: Yes  - 2023 DCIS, ER positive s/p lumpectomy, radiation, on low dose Tamoxifen  Prior radiation history: Yes   - 2023 for left breast DCIS  Self breast exams: Yes  Breast density: heterogeneously dense    FAMILY HISTORY:  Breast ca: No  Ovarian ca: No    PAST MEDICAL HISTORY  Past Medical History:   Diagnosis Date    Allergic rhinitis     Anxiety     Classic migraines     Ductal carcinoma in situ 05/2023    Fibromyalgia     GERD (gastroesophageal reflux disease)     Insomnia     Irritable bowel syndrome     Kidney stones     Panic disorder without agoraphobia      PAST SURGICAL HISTORY   Past Surgical History:   Procedure Laterality Date    COLONOSCOPY  8/16/06    WNL     ESOPHAGOSCOPY, DIAGNOSTIC  5/02    decreased tone at GE junction (Dr. Billings)    LUMPECTOMY BREAST Left 5/25/2023    Procedure: LEFT Radiofrequency Identification Seed-Localized Segmental Mastectomy (=\"Lumpectomy\");  Surgeon: Bernadine Coon MD;  [place] [facility] [place]    [facility] NONSPECIFIC PROCEDURE      [place] teeth removed    [facility] NONSPECIFIC PROCEDURE  1997    Lymph node removal right axilla    [facility] NONSPECIFIC PROCEDURE  1991    Colposcopy    [facility] UGI ENDOSCOPY, SIMPLE EXAM  4/28/06    WNL     MEDICATIONS  Current Outpatient Medications   Medication Sig Dispense Refill    Cholecalciferol (VITAMIN D) 400 UNITS capsule Take 1 capsule by mouth daily.      gabapentin (NEURONTIN) 300 MG capsule Take 1 capsule (300 mg) by mouth 3 times daily 60 capsule 1    hydrocortisone 2.5 % cream Apply topically 2 times daily 30 g 1    " hydrOXYzine (VISTARIL) 25 MG capsule Take 1-2 capsules (25-50 mg) by mouth 3 times daily as needed for anxiety 90 capsule 1    LACTOBACILLUS PO       LORazepam (ATIVAN) 0.5 MG tablet Take 1 tablet (0.5 mg) by mouth 2 times daily as needed (Panic attack) 30 tablet 0    Magnesium Glycinate 100 MG CAPS Take 400 mg by mouth daily      mirtazapine (REMERON) 7.5 MG tablet Take 1 tablet (7.5 mg) by mouth at bedtime 30 tablet 1    MULTI-VITAMIN OR TABS Take 1 tablet by mouth daily  0    tamoxifen (NOLVADEX) 10 MG tablet Take 0.5 tablets (5 mg) by mouth daily 60 tablet 3    tretinoin (RETIN-A) 0.05 % external cream Apply topically At Bedtime      valACYclovir (VALTREX) 1000 mg tablet Take 2 tablets (2,000 mg) by mouth 2 times daily (Patient not taking: Reported on 6/20/2023) 4 tablet 11      ALLERGIES  Allergies   Allergen Reactions    No Known Allergies       SOCIAL HISTORY:  Smokes: No  EtOH: Yes, occasionally   Exercise: active   Teacher, seen with     ROS:   Change in vision No  Headaches: no  Respiratory: No shortness of breath, dyspnea on exertion, cough, or hemoptysis   Cardiovascular: negative   Gastrointestinal: negative Abdominal pain: no  Breast: negative  Musculoskeletal: negative Joint pain No Back pain: no  Psychiatric: negative  Hematologic/Lymphatic/Immunologic: negative  Endocrine: negative    EXAM  /72 (BP Location: Right arm, Patient Position: Sitting, Cuff Size: Adult Regular)   Pulse 92   Temp 97.7  F (36.5  C) (Oral)   Resp 20   Wt 65.2 kg (143 lb 11.2 oz)   LMP 10/24/2007   SpO2 99%   BMI 23.02 kg/m     PHYSICAL EXAM  Respiratory: breathing non labored.   Breasts: Examination was done in both the upright and supine positions.  No masses noted. No skin or nipple changes. No nipple discharge. Right axillary scar. Left lateral breast scar healed.   No clavicular, cervical, or axillary lymphadenopathy.     INVESTIGATIONS:    12/20/23 left diagnostic mammogram: per Radiology no  concerning findings final report pending.     ASSESSMENT/PLAN:    Luna Eugene is a 53 year old woman with history of left breast ductal carcinoma in situ s/p lumpectomy and radiation, now on Tamoxifen 5 mg. Left diagnotic mammogram today without concerning findings.     1) DCIS  Surveillance: History and physical examination every 6-12 months for 5 years, then annually. Mammogram every 12 months.  - Screening mammogram with same day clinic visit with Dr. Jones due: 6/2024    2) Endocrine therapy   The contraindications for Tamoxifen (history of deep vein thrombosis or pulmonary embolism, history or stroke, history of TIA, known inherited clotting trait, pregnancy, and potential pregnancy without an effective nonhormal method of method of contraception) along with potential side effects (hot flashes, deep vein thrombosis, pulmonary embolism, stroke, cataracts, and endometrial cancer including signs and symptoms were discussed.   - Tamoxifen 5 mg daily for 3 years, started 6/20/23  - Baseline gynecological examination recommended. Routine age appropriate gynecologic screening.      3) Lifestyle Modifications were provided.   - Maintain your best healthy weight. Higher body fat and adult weight gain is associated with increased risk for breast cancer. This increase in risk has been attributed to increase in circulating endogenous estrogen levels from fat tissue.   - Any alcohol intake increases the risk for breast cancer. If you choose to drink alcohol limit alcohol consumption to less than 1 drink (1 ounce of liquor, 6 ounces of wine, or 8 ounces of beer) per day or less than 3 drinks per week.  - Be active daily and void being sedentary.   - Vitamin D may decrease the risk of developing breast cancer.     Tamera Mirza PA-C    20 minutes spent on the date of the encounter doing chart review, review of test results, interpretation of tests, patient visit and documentation.

## 2023-12-20 NOTE — NURSING NOTE
"Oncology Rooming Note    December 20, 2023 12:54 PM   Luna Eugene is a 53 year old female who presents for:    Chief Complaint   Patient presents with    Oncology Clinic Visit     Ductal carcinoma in situ of left breast     Initial Vitals: /72 (BP Location: Right arm, Patient Position: Sitting, Cuff Size: Adult Regular)   Pulse 92   Temp 97.7  F (36.5  C) (Oral)   Resp 20   Wt 65.2 kg (143 lb 11.2 oz)   LMP 10/24/2007   SpO2 99%   BMI 23.02 kg/m   Estimated body mass index is 23.02 kg/m  as calculated from the following:    Height as of 9/28/23: 1.683 m (5' 6.25\").    Weight as of this encounter: 65.2 kg (143 lb 11.2 oz). Body surface area is 1.75 meters squared.  No Pain (0) Comment: Data Unavailable   Patient's last menstrual period was 10/24/2007.  Allergies reviewed: Yes  Medications reviewed: Yes    Medications: MEDICATION REFILLS NEEDED TODAY. Provider was notified.  Pharmacy name entered into EPIC:    [id number] - [place], [place] - [address]  [id number] - [place], [place] - [address] [id number] - [place], [place] - [address]  [facility] [place], [place] - [phone]    Frailty Screening:   Is the patient here for a new oncology consult visit in cancer care? 2. No      Clinical concerns: Remeron refill.       Charlene Mehta"

## 2023-12-22 ENCOUNTER — VIRTUAL VISIT (OUTPATIENT)
Dept: ONCOLOGY | Facility: CLINIC | Age: 53
End: 2023-12-22
Attending: GENETIC COUNSELOR, MS
Payer: COMMERCIAL

## 2023-12-22 DIAGNOSIS — D05.12 DUCTAL CARCINOMA IN SITU (DCIS) OF LEFT BREAST: Primary | ICD-10-CM

## 2023-12-22 PROCEDURE — 96040 HC GENETIC COUNSELING, EACH 30 MINUTES: CPT | Mod: GT,95 | Performed by: GENETIC COUNSELOR, MS

## 2023-12-22 NOTE — PATIENT INSTRUCTIONS
BREAST ACTIONABLE PANEL    The Breast Actionable cancer panel is a genetic test which analyzes 11 genes known to be associated with an increased lifetime risk of breast and other cancers. Published medical guidelines exist for detection, prevention, risk reduction, and/or treatment for individuals with mutations in these genes.     Of note, the Breast Actionable cancer panel is not considered to be comprehensive. Individuals with a family history of other cancers should be seen by a genetic counselor to discuss the family history and the possibility of expanded genetic testing.     GENES (11) ASSOCIATED CANCER(S) ASSOCIATED CANCER SYNDROME(S)   JOSE breast, pancreas    BARD1 breast    BRCA1 breast, ovary, pancreas, melanoma, prostate, male breast Hereditary breast /ovarian cancer syndrome (HBOC)   BRCA2 breast, ovary, pancreas, melanoma, prostate, male breast Hereditary breast /ovarian cancer syndrome (HBOC)   CDH1 breast, stomach, colon Hereditary diffuse gastric cancer   CHEK2 breast, colon, prostate    NF1 breast, brain, peripheral nervous system Neurofibromatosis 1   PALB2 breast, pancreas    PTEN breast, thyroid, uterus, colon, kidney Cowden syndrome, Lrfjszpd-Wrumo-Qvbgcnjrd syndrome (BRRS), PTEN-related Proteus syndrome (PS), Proteus-like syndrome   STK11 breast, ovary, colon, pancreas, stomach, uterus, cervix Peutz-Jeghers syndrome   TP53 breast, bone, brain, soft tissues, adrenal cortex Li-Fraumeni syndrome     Meeting with a Genetic Counselor  After you receive the results of the Breast Actionable Panel testing, providers will often refer you to see a genetic counselor. This is because patients can have a family history of cancer other than breast cancer and may benefit from a discussion about comprehensive, expanded genetic testing.     Additionally, you and the genetic counselor will discuss the impact of genetic testing on you and your family members, go over your family health history, and talk  about potential screening and interventions.     Assessing Cancer Risk  Only about 5-10% of cancers are thought to be due to an inherited cancer susceptibility gene.  These families often have:  Several people with the same or related types of cancer  Cancers diagnosed at a young age (before age 50)  Individuals with more than one primary cancer  Multiple generations of the family affected with cancer    Some people may be candidates for genetic testing of more than one gene.  For these families, genetic testing using a multi-gene cancer panel may be offered.  These panels may test genes known to increase the risk for breast (and other) cancers: JOES, BRCA1, BRCA2, CDH1, CHEK2, PALB2, PTEN, and TP53.  The purpose of this handout is to serve as a brief summary of the high/moderate-risk breast cancer genes which have published clinical management guidelines for individuals who are found to carry a mutation.    BRCA1 and BRCA2-Hereditary Breast and Ovarian Cancer Syndrome (HBOC)  A single mutation in one of the copies of BRCA1 or BRCA2 increases the risk for breast and ovarian cancer, among others.  The risk for pancreatic cancer and melanoma may also be slightly increased in some families.  The tables below list the chance that someone with a BRCA mutation would develop cancer in his or her lifetime1,2,3,4.          Women   Men     General Population BRCA+    General Population BRCA+   Breast  12% 40-80%  Breast <1% ~7%   Ovarian  1-2% 10-40%  Prostate 16% 20%       A person s ethnic background is also important to consider, as individuals of Ashkenazi Muslim ancestry have a higher chance of having a BRCA gene mutation.  There are three BRCA mutations that occur more frequently in this population.     Individuals who inherit two BRCA2 mutations--one from their mother and one from their father--have a condition called Fanconi Anemia.  This rare autosomal recessive condition is associated with short stature,  developmental delay, bone marrow failure, and increased risk for childhood cancers.    TP53-Li-Fraumeni Syndrome (LFS)  LFS is a cancer predisposition syndrome. Individuals with LFS are at an increased risk for developing cancer at a young age. The general lifetime risk for development of cancer is 50% by age 30 and 90% by age 60.  LFS is caused by a mutation in the TP53 gene.  A single mutation in one of the copies of TP53 increases the risk for multiple cancers.     Core Cancers: Sarcomas, Breast, Brain, Lung, Leukemias/Lymphomas, Adrenocortical carcinomas  Other Cancers: Gastrointestinal, Thyroid, Skin, Genitourinary    PTEN-Cowden Syndrome  Cowden syndrome is a hereditary condition that increases the risk for breast, thyroid, endometrial, and kidney cancer.  Cowden syndrome is caused by a mutation in the PTEN gene.  A single mutation in one of the copies of PTEN causes Cowden syndrome and increases cancer risk.  The table below shows the chance that someone with a PTEN mutation would develop cancer in their lifetime5,6.  Other benign features seen in some individuals with Cowden syndrome include benign skin lesions (facial papules, keratoses, lipomas), learning disability, autism, thyroid nodules, colon polyps, and larger head size.      Lifetime Cancer Risk   Cancer Type General Population Cowden Syndrome   Breast  12% 25-50%*   Thyroid  1% 35%   Renal 1-2% 35%   Endometrial  2-3% 28%   Colon 5% 9%   Melanoma 2-3% >5%     *One recent study found breast cancer risk to be increased to 85%    CDH1-Hereditary Diffuse Gastric Cancer (HDGC)  Currently, one gene is known to cause hereditary diffuse gastric cancer: CDH1.  Individuals with HDGC are at increased risk for diffuse gastric cancer and lobular breast cancer. Of people diagnosed with HDGC, 30-50% have a mutation in the CDH1 gene.  This suggests there are likely other genes that may cause HDGC that have not been identified yet.      Lifetime Cancer Risks     General Pop HDGC    Diffuse Gastric  <1% ~80%   Breast 12% 39-52%       Additional Genes Associated with Increased Breast Cancer Risk  PALB2  Mutations in PALB2 have been shown to increase the risk of breast cancer up to 33-58% in some families; where individuals fall within this risk range is dependent upon family history.9 PALB2 mutations have also been associated with increased risk for pancreatic cancer, although this risk has not been quantified yet.  Individuals who inherit two PALB2 mutations--one from their mother and one from their father--have a condition called Fanconi Anemia.  This rare autosomal recessive condition is associated with short stature, developmental delay, bone marrow failure, and increased risk for childhood cancers.    JOSE  JOSE is a moderate-risk breast cancer gene. Women who have a mutation in JOSE can have between a 2-4 fold increased risk for breast cancer compared to the general population.10 JOSE mutations have also been associated with increased risk for pancreatic cancer, however an estimate of this cancer risk is not well understood.11  Individuals who inherit two JOSE mutations have a condition called ataxia-telangiectasia (AT).  This rare autosomal recessive condition affects the nervous system and immune system, and is associated with progressive cerebellar ataxia beginning in childhood.  Individuals with ataxia-telangiectasia often have a weakened immune system and have an increased risk for childhood cancers.         CHEK2   CHEK2 is a moderate-risk breast cancer gene.  Women who have a mutation in CHEK2 have around a 2-fold increased risk for breast cancer compared to the general population, and this risk may be higher depending upon family history.12,13,14 Mutations in CHEK2 have also been shown to increase the risk of a number of other cancers, including colon and prostate, however these cancer risks are currently not well understood.      Inheritance   All of the genes  reviewed above are inherited in an autosomal dominant pattern. This means that if a parent has a mutation, each of his or her children will have a 50% chance of inheriting that same mutation.  Therefore, each child--male or female--would have a 50% chance of being at increased risk for developing cancer.        Image obtained from Genetics Home Reference, 2013     In rare cases, there can be mutations in both copies of these genes (one from each parent), leading to different autosomal recessive conditions.  Mutations in some genes can occur de augusto, which means that a person s mutation occurred for the first time in them and was not inherited from a parent.  Now that they have the mutation, however, it can be passed on to future generations.     Genetic Testing  Genetic testing involves a blood test and will look for any harmful mutations within genes that are associated with increased cancer risk.  If possible, it is recommended that the person(s) who has had cancer be tested before other family members.  That person will give us the most useful information about whether or not a specific gene is associated with the cancer in the family.    Results  There are three possible results of genetic testing:  Positive--a harmful mutation was identified in one or more of the genes  Negative--no mutation was identified in any of the genes on this panel  Variant of unknown significance--a variation in one of the genes was identified, but it is unclear how this impacts cancer risk in the family    Advantages and Disadvantages  There are advantages and disadvantages to genetic testing.  Advantages  May clarify your cancer risk  Can help you make medical decisions  May explain the cancers in your family  May give useful information to your family members (if you share your results)    Disadvantages  Possible negative emotional impact of learning about inherited cancer risk  Uncertainty in interpreting a negative test result in  some situations  Possible genetic discrimination concerns (see below)    Genetic Information Nondiscrimination Act (DOUG)  DOUG is a federal law that protects individuals from health insurance or employment discrimination based on a genetic test result alone.  Although rare, there are currently no legal discrimination protections in terms of life insurance, long term care, or disability insurances. Visit the National Human Genome Research [place] genome.gov/47226388 to learn more.    Reducing Cancer Risk  Each of the genes listed within this handout have nationally recognized cancer screening guidelines that would be recommended for individuals who test positive.  In addition to increased cancer screening, surgeries may be offered or recommended to reduce cancer risk.  Recommendations are based upon an individual s genetic test result as well as their personal and family history of cancer.    Questions to Think About Regarding Genetic Testing  What effect will the test result have on me and my relationship with my family members if I have an inherited gene mutation?  If I don t have a gene mutation?  Should I share my test results, and how will my family react to this news, which may also affect them?  Are my children ready to learn new information that may one day affect their own health?    Resources  FORCE: Facing Our Risk of Cancer Empowered facingourrisk.org   Bright Pink bebrightpink.org   Li-Fraumeni Syndrome Association lfsassociation.org   PTEN World [employer].Food[employer]r   No stomach for cancer, Inc. nostomachforcancer.org   Stomach cancer relief network scrnet.org   Collaborative Group of the Americas on Inherited Colorectal Cancer (CGA) cgaicc.com    Cancer Care cancercare.org   American Cancer Society (ACS) cancer.org   National Cancer [place] (NCI) cancer.gov     Cancer Risk Management Program [phone] ([phone])  Vasyl Abreu, MS [facility] [place]  [phone]  Micaela Castellanos, MS, [facility] [place] [phone]  Echo  Winter, MS, [facility] [place]  [phone]  Karoline Gill, MS, [facility] [place]  [phone]  Janina Handley, MS, [facility] [place]  [phone]  Kenisha Olson, MS, [facility] [place]  [phone]  Florinda Roque, MS, [facility] [place]  [phone]    References  Arturo ORTIZ, Arti PDP, Narod S, Risrobin CASTILLO, Na JE, Sunita JL, Marcellus N, Angely H, Suad O, Meche A, Singh B, Radiroz P, Manadrien S, Nieves DM, Leroy N, Eric E, Ivet H, Mike E, Joey J, Darrius J, Michelle B, Arnold H, Isaiah S, Eerola H, Merissa H, Suman K, Barry OP. Average risks of breast and ovarian cancer associated with BRCA1 or BRCA2 mutations detected in case series unselected for family history: a combined analysis of 222 studies. Am J Hum [place]. 2003;72:1117-30.  Denzel N, Hannah M, Fred G.  BRCA1 and BRCA2 Hereditary Breast and Ovarian Cancer. Gene Reviews online. 2013.  Gary YC, Milagro S, Malu G, Quinteros S. Breast cancer risk among male BRCA1 and BRCA2 mutation carriers. J Natl Cancer Inst. 2007;99:1811-4.  Tae DG, Maddi I, Marco J, Anuel E, Jony ER, Ladallin F. Risk of breast cancer in male BRCA2 carriers. J Med [place]. 2010;47:710-1.  Hamilton MH, Etta J, Chente J, Landon WETZEL, Chayo MS, Eng C. Lifetime cancer risks in individuals with germline PTEN mutations. Clin Cancer Res. 2012;18:400-7.  Pilreema RIDER. Cowden Syndrome: A Critical Review of the Clinical Literature. J [place] . 2009:18:13-27.  National Comprehensive Cancer Network. Clinical practice guidelines in oncology, colorectal cancer screening. Available online (registration required). 2013.  National Cancer [place]. SEER Cancer Stat Fact Sheets.  December 2013.  Arturo LANCE, et al. Breast-Cancer Risk in Families with Mutations in PALB2. NE. 2014; 371(6):497-506.  Sriram A, Denver D, Evon S, Suzy P, Kingsley T, Yusuf M, Leonel B, La H, Dominique R, Kavin K, Dread L, Tae DG, Nieves D, Scott DF, Swati MR, The Breast Cancer Susceptibility Collaboration (UK) & Arvind QUIGLEY. JOSE  mutations that cause ataxia-telangiectasia are breast cancer susceptibility alleles. Nature Genetics. 2006;38:873-875  Arthur N , Horace Y, Analia J, Sydney L, Miladys GM , All ML, Claudia S, Mcdermott AG, Jose Eduardo S, Kane ML, Christopher J , Lilia R, Luana SZ, Gera JR, Jose VE, Marie M, Vojoanstein B, Talita N, Debbie RH, Melvina KW, and Chay AP. JOSE mutations in patients with hereditary pancreatic cancer. Cancer Discover. 2012;2:41-46  CHEK2 Breast Cancer Case-Control Consortium. CHEK2*1100delC and susceptibility to breast cancer: A collaborative analysis involving 10,860 breast cancer cases and 9,065 controls from 10 studies. Am J Hum [place], 74 (2004), pp. 0996-2103  Kinjal T, Gwen S, Anderson K, et al. Spectrum of Mutations in BRCA1, BRCA2, CHEK2, and TP53 in Families at High Risk of Breast Cancer. SHAI, 2006;295(12):9110-8395.   Kira C, Jae D, Cherie A, et al. Risk of breast cancer in women with a CHEK2 mutation with and without a family history of breast cancer. JClin Oncol. 2011;29:2594-9953.      TURNAROUND TIME  4 - 6 weeks    INSURANCE COVERAGE   A prior authorization will be submitted when your provider submits the order for this panel. Testing will be held until prior authorization is obtained. You will be contacted once prior authorization is completed. For details regarding coverage and out-of-pocket estimates, please contact a  at the [facility] at [phone].    About the [facility]), [facility]  In collaborative effort with the [facility] and the [place] [employer], the [facility] offers a full range of diagnostic testing services, with a strong focus on quality, accuracy, and timeliness.

## 2023-12-22 NOTE — Clinical Note
12/22/2023         RE: Luna Eugene  [address] [place] [address]        Dear Colleague,    Thank you for referring your patient, Luna Eugene, to the [facility]. Please see a copy of my visit note below.    12/22/2023    Referring Provider: Bernadine Coon MD    Presenting Information:   I met with Luna for her video genetic counseling visit, through the Cancer Risk Management Program, to discuss her personal history of breast cancer. Today we reviewed this history, cancer screening recommendations, and available genetic testing options.     Personal History:  Luna is a 53 year old year old female. 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 She had negative genetic testing for 11 genes through the Breast Actionable panel at the [facility]: JOSE, BARD1, BRCA1, BRCA2, CDH1, CHEK2, NF1, PALB2, PTEN, STK11, and TP53.     She had her first menstrual period at age 13, her first child at age 24, and completed menopause around age 50. Luna has her ovaries, fallopian tubes and uterus in place, and she has had no ovarian cancer screening to date. She reports that she has not used hormone replacement therapy. She has annual clinical breast exams and mammograms; her most recent mammogram in April 2023 found pleomorphic calcifications that were identified as DCIS. Luna began having colonoscopies at the age of 35. Her most recent colonoscopy in October 2021 was normal and follow-up was recommended in 10 years. She does not regularly do any other cancer screening at this time.     Family History: (Please see scanned pedigree for detailed family history information)  Luna's mother was diagnosed with chronic myelogenous leukemia (CML) at age 67.  A maternal uncle and her maternal  grandfather have a history of skin cancer.  There is no reported paternal family history of cancer.  Her maternal and paternal ethnicity is [place] and [place]ian. There is no known Ashkenazi Yazdanism ancestry on either side of her family. There is no reported consanguinity.    Discussion:  Luna's personal history of breast cancer is suggestive of a hereditary cancer syndrome.  We reviewed the features of sporadic, familial, and hereditary cancers. We discussed that mutations in either BRCA1 or BRCA2 could be possible hereditary explanations for her family history of cancer. Mutations in the BRCA1 or BRCA2 gene are known to cause Hereditary Breast and Ovarian Cancer Syndrome (HBOC). HBOC typically presents with multiple family members diagnosed with breast cancer before age 50 and/or ovarian cancer. Other cancer risks associated with HBOC include male breast cancer, prostate cancer, pancreatic cancer, and melanoma.   Of note, Luna tested negative for BRCA1/2 along with 9 other genes associated hereditary breast cancer.  We discussed the natural history and genetics of hereditary cancer. A detailed handout regarding hereditary cancer, along with the other information we discussed, will be mailed to Luna at the end of our appointment today and can be found in the after visit summary. Topics included: inheritance pattern, cancer risks, cancer screening recommendations, and also risks, benefits and limitations of testing.  Based on her personal and family history, Luna does not meet current National Comprehensive Cancer Network (NCCN) criteria for genetic testing of BRCA1/2.  However, according to the American Society of Breast Surgeons Consensus Guideline on Genetic Testing for Hereditary Breast Cancer, genetic testing should be available to all patients who have been diagnosed with breast cancer.    The guidelines state that testing should include BRCA1, BRCA2, and PALB2, and that additional testing may be  warranted based on personal and/or family history. NCCN guidelines also mention that there may be a role for multi-gene panel genetic testing for patients who have previously tested negative with a limited panel, but have a suspicious personal or family history.  We discussed that there are additional genes that could cause increased risk for breast cancer. As many of these genes present with overlapping features in a family and accurate cancer risk cannot always be established based upon the pedigree analysis alone, it would be reasonable for Luna to consider panel genetic testing to analyze multiple genes at once.  Luna wanted some time to consider additional testing. I encouraged her to contact me should she be interested in additional genetic testing in the future.  Medical Management: For Luna, we reviewed that the information from genetic testing may determine:  additional cancer screening for which Luna may qualify (i.e. mammogram and breast MRI, more frequent colonoscopies, more frequent dermatologic exams, etc.),  options for risk reducing surgeries Luna could consider (i.e. bilateral mastectomy, surgery to remove her ovaries and/or uterus, etc.),    and targeted chemotherapies if she were to develop certain cancers in the future (i.e. immunotherapy for individuals with Moore syndrome, PARP inhibitors, etc.).   These recommendations and possible targeted chemotherapies will be discussed in detail if genetic testing is completed.     Plan:  1) Today Luna wanted some time to consider additional genetic testing.  2) A copy of the after visit summary will be sent to Luna.  3) Luna will follow up with me should she be interested in additional genetic testing in the future.    Time spent on video: 23 minutes    Vasyl Abreu MS, [facility] [place] [facility]  Licensed, Certified Genetic Counselor      Virtual Visit Details    Type of service:  Video Visit     Originating Location (pt. Location):  Home  Distant Location (provider location):  Off-site  Platform used for Video Visit: Ricardo      Again, thank you for allowing me to participate in the care of your patient.        Sincerely,        Vasyl Abreu GC

## 2023-12-22 NOTE — NURSING NOTE
Is the patient currently in the state of [place]? YES    Visit mode:VIDEO    If the visit is dropped, the patient can be reconnected by: VIDEO VISIT: Text to cell phone:   Telephone Information:   Mobile [phone]       Will anyone else be joining the visit? YES: How would they like to receive their invitation? Text to cell phone: na  (If patient encounters technical issues they should call [phone]    How would you like to obtain your AVS? MyChart    Are changes needed to the allergy or medication list? N/A    Reason for visit: Consult    Lewis MOJICAF

## 2023-12-22 NOTE — PROGRESS NOTES
12/22/2023    Referring Provider: Bernadine Coon MD    Presenting Information:   I met with Luna for her video genetic counseling visit, through the Cancer Risk Management Program, to discuss her personal history of breast cancer. Today we reviewed this history, cancer screening recommendations, and available genetic testing options.     Personal History:  Luna is a 53 year old year old female. 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 She had negative genetic testing for 11 genes through the Breast Actionable panel at the [facility] ([facility] at [facility]: JOSE, BARD1, BRCA1, BRCA2, CDH1, CHEK2, NF1, PALB2, PTEN, STK11, and TP53.     She had her first menstrual period at age 13, her first child at age 24, and completed menopause around age 50. Luna has her ovaries, fallopian tubes and uterus in place, and she has had no ovarian cancer screening to date. She reports that she has not used hormone replacement therapy. She has annual clinical breast exams and mammograms; her most recent mammogram in April 2023 found pleomorphic calcifications that were identified as DCIS. Luna began having colonoscopies at the age of 35. Her most recent colonoscopy in October 2021 was normal and follow-up was recommended in 10 years. She does not regularly do any other cancer screening at this time.     Family History: (Please see scanned pedigree for detailed family history information)  Luna's mother was diagnosed with chronic myelogenous leukemia (CML) at age 67.  A maternal uncle and her maternal grandfather have a history of skin cancer.  There is no reported paternal family history of cancer.  Her maternal and paternal ethnicity is Citizen of [place] and [place]. There is no known Ashkenazi Christianity ancestry on either side of her family. There is no reported  consanguinity.    Discussion:  Luna's personal history of breast cancer is suggestive of a hereditary cancer syndrome.  We reviewed the features of sporadic, familial, and hereditary cancers. We discussed that mutations in either BRCA1 or BRCA2 could be possible hereditary explanations for her family history of cancer. Mutations in the BRCA1 or BRCA2 gene are known to cause Hereditary Breast and Ovarian Cancer Syndrome (HBOC). HBOC typically presents with multiple family members diagnosed with breast cancer before age 50 and/or ovarian cancer. Other cancer risks associated with HBOC include male breast cancer, prostate cancer, pancreatic cancer, and melanoma.   Of note, Luna tested negative for BRCA1/2 along with 9 other genes associated hereditary breast cancer.  We discussed the natural history and genetics of hereditary cancer. A detailed handout regarding hereditary cancer, along with the other information we discussed, will be mailed to Luna at the end of our appointment today and can be found in the after visit summary. Topics included: inheritance pattern, cancer risks, cancer screening recommendations, and also risks, benefits and limitations of testing.  Based on her personal and family history, Luna does not meet current National Comprehensive Cancer Network (NCCN) criteria for genetic testing of BRCA1/2.  However, according to the American Society of Breast Surgeons Consensus Guideline on Genetic Testing for Hereditary Breast Cancer, genetic testing should be available to all patients who have been diagnosed with breast cancer.    The guidelines state that testing should include BRCA1, BRCA2, and PALB2, and that additional testing may be warranted based on personal and/or family history. NCCN guidelines also mention that there may be a role for multi-gene panel genetic testing for patients who have previously tested negative with a limited panel, but have a suspicious personal or family  history.  We discussed that there are additional genes that could cause increased risk for breast cancer. As many of these genes present with overlapping features in a family and accurate cancer risk cannot always be established based upon the pedigree analysis alone, it would be reasonable for Luna to consider panel genetic testing to analyze multiple genes at once.  Luna wanted some time to consider additional testing. I encouraged her to contact me should she be interested in additional genetic testing in the future.  Medical Management: For Luna, we reviewed that the information from genetic testing may determine:  additional cancer screening for which Luna may qualify (i.e. mammogram and breast MRI, more frequent colonoscopies, more frequent dermatologic exams, etc.),  options for risk reducing surgeries Luna could consider (i.e. bilateral mastectomy, surgery to remove her ovaries and/or uterus, etc.),    and targeted chemotherapies if she were to develop certain cancers in the future (i.e. immunotherapy for individuals with Moore syndrome, PARP inhibitors, etc.).   These recommendations and possible targeted chemotherapies will be discussed in detail if genetic testing is completed.     Plan:  1) Today Luna wanted some time to consider additional genetic testing.  2) A copy of the after visit summary will be sent to Luna.  3) Luna will follow up with me should she be interested in additional genetic testing in the future.    Time spent on video: 23 minutes    Vasyl Abreu MS, [facility]  Licensed, Certified Genetic Counselor      Virtual Visit Details    Type of service:  Video Visit     Originating Location (pt. Location): Home  Distant Location (provider location):  Off-site  Platform used for Video Visit: Ricardo

## 2023-12-22 NOTE — LETTER
December 22, 2023    Luna Eugene  [address] [address]      Dear Luna,    It was a pleasure speaking with you over video on 12/22/2023. Here is a copy of the progress note from our discussion. If you have any additional questions, please feel free to call.    Referring Provider: Bernadine Coon MD    Presenting Information:   I met with Luna for her video genetic counseling visit, through the Cancer Risk Management Program, to discuss her personal history of breast cancer. Today we reviewed this history, cancer screening recommendations, and available genetic testing options.     Personal History:  Luna is a 53 year old year old female. 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 She had negative genetic testing for 11 genes through the Breast Actionable panel at the [facility] ([facility] at [facility]: JOSE, BARD1, BRCA1, BRCA2, CDH1, CHEK2, NF1, PALB2, PTEN, STK11, and TP53.     She had her first menstrual period at age 13, her first child at age 24, and completed menopause around age 50. Luna has her ovaries, fallopian tubes and uterus in place, and she has had no ovarian cancer screening to date. She reports that she has not used hormone replacement therapy. She has annual clinical breast exams and mammograms; her most recent mammogram in April 2023 found pleomorphic calcifications that were identified as DCIS. Luna began having colonoscopies at the age of 35. Her most recent colonoscopy in October 2021 was normal and follow-up was recommended in 10 years. She does not regularly do any other cancer screening at this time.     Family History: (Please see scanned pedigree for detailed family history information)  Luna's mother was diagnosed with chronic myelogenous leukemia (CML) at age 67.  A maternal uncle and her  maternal grandfather have a history of skin cancer.  There is no reported paternal family history of cancer.  Her maternal and paternal ethnicity is [place]ijani and [place]an. There is no known Ashkenazi Zoroastrian ancestry on either side of her family. There is no reported consanguinity.    Discussion:  Luna's personal history of breast cancer is suggestive of a hereditary cancer syndrome.  We reviewed the features of sporadic, familial, and hereditary cancers. We discussed that mutations in either BRCA1 or BRCA2 could be possible hereditary explanations for her family history of cancer. Mutations in the BRCA1 or BRCA2 gene are known to cause Hereditary Breast and Ovarian Cancer Syndrome (HBOC). HBOC typically presents with multiple family members diagnosed with breast cancer before age 50 and/or ovarian cancer. Other cancer risks associated with HBOC include male breast cancer, prostate cancer, pancreatic cancer, and melanoma.   Of note, Luna tested negative for BRCA1/2 along with 9 other genes associated hereditary breast cancer.  We discussed the natural history and genetics of hereditary cancer. A detailed handout regarding hereditary cancer, along with the other information we discussed, will be mailed to Luna at the end of our appointment today and can be found in the after visit summary. Topics included: inheritance pattern, cancer risks, cancer screening recommendations, and also risks, benefits and limitations of testing.  Based on her personal and family history, Luna does not meet current National Comprehensive Cancer Network (NCCN) criteria for genetic testing of BRCA1/2.  However, according to the American Society of Breast Surgeons Consensus Guideline on Genetic Testing for Hereditary Breast Cancer, genetic testing should be available to all patients who have been diagnosed with breast cancer.    The guidelines state that testing should include BRCA1, BRCA2, and PALB2, and that additional testing  may be warranted based on personal and/or family history. NCCN guidelines also mention that there may be a role for multi-gene panel genetic testing for patients who have previously tested negative with a limited panel, but have a suspicious personal or family history.  We discussed that there are additional genes that could cause increased risk for breast cancer. As many of these genes present with overlapping features in a family and accurate cancer risk cannot always be established based upon the pedigree analysis alone, it would be reasonable for Luna to consider panel genetic testing to analyze multiple genes at once.  Luna wanted some time to consider additional testing. I encouraged her to contact me should she be interested in additional genetic testing in the future.  Medical Management: For Luna, we reviewed that the information from genetic testing may determine:  additional cancer screening for which Luna may qualify (i.e. mammogram and breast MRI, more frequent colonoscopies, more frequent dermatologic exams, etc.),  options for risk reducing surgeries Luna could consider (i.e. bilateral mastectomy, surgery to remove her ovaries and/or uterus, etc.),    and targeted chemotherapies if she were to develop certain cancers in the future (i.e. immunotherapy for individuals with Moroe syndrome, PARP inhibitors, etc.).   These recommendations and possible targeted chemotherapies will be discussed in detail if genetic testing is completed.     Plan:  1) Today Luna wanted some time to consider additional genetic testing.  2) A copy of the after visit summary will be sent to Luna.  3) Luna will follow up with me should she be interested in additional genetic testing in the future.    Time spent on video: 23 minutes    Vasyl Abreu MS, [facility]  Licensed, Certified Genetic Counselor

## 2024-01-18 ENCOUNTER — MYC MEDICAL ADVICE (OUTPATIENT)
Dept: SURGERY | Facility: CLINIC | Age: 54
End: 2024-01-18
Payer: COMMERCIAL

## 2024-01-19 DIAGNOSIS — F41.9 ANXIETY: ICD-10-CM

## 2024-01-19 RX ORDER — MIRTAZAPINE 7.5 MG/1
7.5 TABLET, FILM COATED ORAL AT BEDTIME
Qty: 30 TABLET | Refills: 1 | Status: SHIPPED | OUTPATIENT
Start: 2024-01-19 | End: 2024-03-12

## 2024-01-19 NOTE — TELEPHONE ENCOUNTER
Received [employer] message from patient requesting refill of mirtazapine.     Last office visit: 9/12/23  Scheduled for follow up PRN, however notified pt she should call to schedule a follow up in March since Dr. Wood continues to prescribe this for her.     Will route request to MD for review.

## 2024-03-12 ENCOUNTER — VIRTUAL VISIT (OUTPATIENT)
Dept: PALLIATIVE CARE | Facility: CLINIC | Age: 54
End: 2024-03-12
Attending: STUDENT IN AN ORGANIZED HEALTH CARE EDUCATION/TRAINING PROGRAM
Payer: COMMERCIAL

## 2024-03-12 DIAGNOSIS — Z51.5 ENCOUNTER FOR PALLIATIVE CARE: ICD-10-CM

## 2024-03-12 DIAGNOSIS — F41.9 ANXIETY: ICD-10-CM

## 2024-03-12 DIAGNOSIS — T45.1X5A HOT FLASHES DUE TO TAMOXIFEN: ICD-10-CM

## 2024-03-12 DIAGNOSIS — G47.09 OTHER INSOMNIA: ICD-10-CM

## 2024-03-12 DIAGNOSIS — R23.2 HOT FLASHES DUE TO TAMOXIFEN: ICD-10-CM

## 2024-03-12 DIAGNOSIS — D05.12 DUCTAL CARCINOMA IN SITU (DCIS) OF LEFT BREAST: Primary | ICD-10-CM

## 2024-03-12 PROCEDURE — 99215 OFFICE O/P EST HI 40 MIN: CPT | Mod: 95 | Performed by: STUDENT IN AN ORGANIZED HEALTH CARE EDUCATION/TRAINING PROGRAM

## 2024-03-12 RX ORDER — MIRTAZAPINE 7.5 MG/1
7.5 TABLET, FILM COATED ORAL AT BEDTIME
Qty: 30 TABLET | Refills: 4 | Status: SHIPPED | OUTPATIENT
Start: 2024-03-12 | End: 2024-08-05

## 2024-03-12 NOTE — PROGRESS NOTES
"Palliative Care Progress Note    Patient Name: Luna Eugene  Primary Provider: Lisa Jack    Chief Complaint/Patient ID: Luna Eugene is a 53 year old female with PMHx of DCIS, s/p partial mastectomy and localized RT. On tamoxifen.  -Worsening anxiety in setting of cancer dx/treatment.    Last Palliative care appointment: 9/12/23 with me. Discussed mood, bowels, sleep, and GERD.    Interim History:  Luna Eugene is a 53 year old female who is seen today for follow up with Palliative Care via billable video visit.      After our last visit, changed anxiety regimen to Remeron and hydroxyzine.   Reviewed oncology note from 12/20/2023.  Clinically stable.  Planning to continue tamoxifen through June 2026.    Overall says that she is doing \"pretty good\".  The Remeron was \"a saving joseph\".  She still has some days where she wakes up feeling anxious, but it is much more intermittent.  She has not had to use the hydroxyzine for breakthrough symptoms since the fall.  She does still have some episodes of worrying, however she notes she has a very supportive team at school with her to help her on days when she is feeling like this.  Also continuing to meet with therapist at [facility] in [place] about once per month.    Sleep has improved with the Remeron as has her appetite.  She states she does still wake up due to hot flashes at night, however she usually is able to fall back asleep easily.  Hot flashes do not happen every night, however when they do they can be very significant.  Says these episodes go \"in spurts\".    She is still trying to walk 3 to 5 miles per day, even on colder days.    Will have next set of scans in June and follow-up with primary oncologist.    Social History:  .  2 daughters.  Has a dog.  (currently third grade).   Social History     Tobacco Use    Smoking status: Never     Passive exposure: Never    Smokeless tobacco: Never "   Substance Use Topics    Alcohol use: Yes     Comment: socially    Drug use: No     Advanced Care Planning: Has not completed.  and daughters would be HCAs. Provided [place] info 9/12/23.    Family History- Reviewed in Epic.    Medications- Reviewed in Epic.    Past Medical History- Reviewed in [facility].    Past Surgical History- Reviewed in Epic.    Physical Exam:   Constitutional: Alert, pleasant, no apparent distress. Sitting up in chair.  Eyes: Sclera non-icteric, no eye discharge.  ENT: No nasal discharge. Ears grossly normal.  Respiratory: Unlabored respirations. Speaking in full sentences.  Musculoskeletal: Extremities appear normal- no gross deformities noted. No edema noted on upper body.   Skin: No suspicious lesions or rashes on visible skin.  Neurologic: Clear speech, no aphasia. No facial droop.  Psychiatric: Mentation appears normal, appropriate attention. Affect normal/bright. Does not appear anxious or depressed.    Key Data Reviewed:  IMAGING: Left Diagnostic Mammogram w/ russel 12/20/23- Findings:     Posttreatment changes of left breast conservation therapy. No suspicious abnormality in the left breast.                                                            IMPRESSION: BI-RADS CATEGORY: 2 - Benign.    EKG 10/26/23- Vent Rate 57 BPM, QTc 430ms.    Impression & Recommendations & Counseling:  Luna Eugene is a 53 year old female with history of DCIS, s/p partial mastectomy and localized RT.      Hot Flashes - Really significant on some nights, but states they are not happening every night.  She did previously have a prescription for gabapentin, however she never took it.  Discussed we could add this in at night to try to help.  She would prefer to keep track of her symptoms over the next few weeks and potentially make a decision after that.  -Agree with journaling hot flash severity and frequency overnight.  -If she feels that hot flashes are significant enough to warrant a  trial of medication, would start gabapentin.  Would likely be okay to start with 300 mg, however she like to be more cautious, we could send 100 mg capsules.    Anxiety  Sleep - Improved with Remeron.  -Continue following with mindfulness therapist.  -Continue Remeron 7.5 mg daily for now.  We did discuss the possibility of a dose increase in the future if needed.  Would still have sleep and appetite benefit up to 15 mg.  -Continue hydroxyzine 25-50mg TID PRN-has not needed in several months.  -Continue with regular exercise and breathing focus.      Follow up: 5 months      Video-Visit Details  Video Start Time: 11:24AM  Video End Time: 11:55 AM    Originating Location (pt. Location): Home     Distant Location (provider location):  Offsite- Personal Home      Platform used for Video Visit: [employer]     Total time spent on day of encounter is 46 mins, including reviewing record, review of above studies, above visit with patient, symptomatic discussion as above, including medication adjustments/prescription management, and documentation.     Elizabeth Wood,   Palliative Medicine   [id number]    Some chart documentation performed using Dragon Voice recognition Software. Although reviewed after completion, some words and grammatical errors may remain.

## 2024-03-12 NOTE — NURSING NOTE
Is the patient currently in the state of [place]? YES    Visit mode:VIDEO    If the visit is dropped, the patient can be reconnected by: VIDEO VISIT: Send to e-mail at: [email or web address]    Will anyone else be joining the visit? NO  (If patient encounters technical issues they should call [phone]    How would you like to obtain your AVS? MyChart    Are changes needed to the allergy or medication list? No    Reason for visit: PAULA THAKKAR

## 2024-03-12 NOTE — LETTER
"3/12/2024       RE: Luna Eugene  [address]  [place] [address]     Dear Colleague,    [facility] you [facility] Luna Eugene[facility]. Please see a copy of my visit note below.    Palliative Care Progress Note    Patient Name: Luna Eugene  Primary Provider: Lisa Jack    Chief Complaint/Patient ID: Luna Eugene is a 53 year old female with PMHx of DCIS, s/p partial mastectomy and localized RT. On tamoxifen.  -Worsening anxiety in setting of cancer dx/treatment.    Last Palliative care appointment: 9/12/23 with me. Discussed mood, bowels, sleep, and GERD.    Interim History:  Luna Eugene is a 53 year old female who is seen today for follow up with Palliative Care via billable video visit.      After our last visit, changed anxiety regimen to Remeron and hydroxyzine.   Reviewed oncology note from 12/20/2023.  Clinically stable.  Planning to continue tamoxifen through June 2026.    Overall says that she is doing \"pretty good\".  The Remeron was \"a saving joseph\".  She still has some days where she wakes up feeling anxious, but it is much more intermittent.  She has not had to use the hydroxyzine for breakthrough symptoms since the fall.  She does still have some episodes of worrying, however she notes she has a very supportive team at school with her to help her on days when she is feeling like this.  Also continuing to meet with therapist at [facility] in [place] about once per month.    Sleep has improved with the Remeron as has her appetite.  She states she does still wake up due to hot flashes at night, however she usually is able to fall back asleep easily.  Hot flashes do not happen every night, however when they do they can be very significant.  Says these episodes go \"in spurts\".    She is still trying to walk 3 to 5 miles per day, even on colder " days.    Will have next set of scans in June and follow-up with primary oncologist.    Social History:  .  2 daughters.  Has a dog.  (currently third grade).   Social History     Tobacco Use    Smoking status: Never     Passive exposure: Never    Smokeless tobacco: Never   Substance Use Topics    Alcohol use: Yes     Comment: socially    Drug use: No     Advanced Care Planning: Has not completed.  and daughters would be HCAs. Provided [place] info 9[place]12/23.    Family History- Reviewed in Epic.    Medications- Reviewed in Epic.    Past Medical History- Reviewed in [facility].    Past Surgical History- Reviewed in Epic.    Physical Exam:   Constitutional: Alert, pleasant, no apparent distress. Sitting up in chair.  Eyes: Sclera non-icteric, no eye discharge.  ENT: No nasal discharge. Ears grossly normal.  Respiratory: Unlabored respirations. Speaking in full sentences.  Musculoskeletal: Extremities appear normal- no gross deformities noted. No edema noted on upper body.   Skin: No suspicious lesions or rashes on visible skin.  Neurologic: Clear speech, no aphasia. No facial droop.  Psychiatric: Mentation appears normal, appropriate attention. Affect normal/bright. Does not appear anxious or depressed.    Key Data Reviewed:  IMAGING: Left Diagnostic Mammogram w/ russel 12/20/23- Findings:     Posttreatment changes of left breast conservation therapy. No suspicious abnormality in the left breast.                                                            IMPRESSION: BI-RADS CATEGORY: 2 - Benign.    EKG 10/26/23- Vent Rate 57 BPM, QTc 430ms.    Impression & Recommendations & Counseling:  Luna Eugene is a 53 year old female with history of DCIS, s/p partial mastectomy and localized RT.      Hot Flashes - Really significant on some nights, but states they are not happening every night.  She did previously have a prescription for gabapentin, however she never took it.   Discussed we could add this in at night to try to help.  She would prefer to keep track of her symptoms over the next few weeks and potentially make a decision after that.  -Agree with journaling hot flash severity and frequency overnight.  -If she feels that hot flashes are significant enough to warrant a trial of medication, would start gabapentin.  Would likely be okay to start with 300 mg, however she like to be more cautious, we could send 100 mg capsules.    Anxiety  Sleep - Improved with Remeron.  -Continue following with mindfulness therapist.  -Continue Remeron 7.5 mg daily for now.  We did discuss the possibility of a dose increase in the future if needed.  Would still have sleep and appetite benefit up to 15 mg.  -Continue hydroxyzine 25-50mg TID PRN-has not needed in several months.  -Continue with regular exercise and breathing focus.      Follow up: 5 months       Total time spent on day of encounter is 46 mins, including reviewing record, review of above studies, above visit with patient, symptomatic discussion as above, including medication adjustments/prescription management, and documentation.       Some chart documentation performed using Dragon Voice recognition Software. Although reviewed after completion, some words and grammatical errors may remain.        Again, thank you for allowing me to participate in the care of your patient.      Sincerely,    Elizabeth Wood, DO

## 2024-03-12 NOTE — PATIENT INSTRUCTIONS
Recommendations:  -Keep track of how your nights are with the hot flashes.  If you feel like they are really interfering with good sleep and rest, we discussed that we could try the gabapentin only at bedtime.  This is safe to do with the Remeron.  -Remember it is okay to use the hydroxyzine for breakthrough anxiety symptoms that are not responsive to other methods such as breathing exercises.  -We also that if you feel anxiety is starting to become more pervasive, we could increase the Remeron to 15 mg.  This dose can help with anxiety but also not lose the benefits of sleep and appetite improvement.    Follow up: About 5 months      Reasons to Call    If you are having worsening/uncontrolled symptoms we want you to call!    You or your other physicians make any changes to medications we have prescribed.  -Please call for refills 4-5 days before you will run out of medication.    Important Phone Numbers, including: Refills, scheduling, and general questions     Palliative Care RN: Faustina Faith - [phone]  *After hours or on weekends. Will connect you with on call MD. [phone]

## 2024-06-09 NOTE — PROGRESS NOTES
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  She underwent a partial mastectomy which demonstrated pTis disease.  She is s/p adjuvant radiation therapy.  Germline testing was negative.  She was started on 5 mg of tamoxifen in August 2024 and presents today for follow-up.      Interval History  Luna is accompanied by her  today and presents for follow-up.  Since her last visit with me, she has overall been feeling well without any significant issues on 5 mg of tamoxifen.    She is considering starting Viviscal and collagen supplement and wonders if this is okay while she is on tamoxifen.      She also has concerns regarding her overall bone density.  She notes her mother and maternal grandmother have significant osteoporosis and wants to do everything she can to minimize her risk of this.  She is currently taking vitamin D3, but is not on any calcium.  She is exercising fairly regularly including with weightbearing exercises.    She also had concerns regarding vaginal dryness, irritation, some dyspareunia.  She wonders if she can safely take vaginal estrogen.  She is currently not regularly utilizing any products.    She has otherwise been feeling well without any headaches, visual changes, cough, SOB, chest pain, n/v, constipation/diarrhea, abdominal pain, focal weakness or numbness.         Her full oncologic history is as follows:   Oncologic History  Patient Active Problem List    Diagnosis Date Noted    Ductal carcinoma in situ (DCIS) of left breast 05/16/2023     Priority: High     Check 1.24 f/u Id[facility]    Left breast DCIS  4/12/2022 last screening mammogram normal    4/19/2023 screening mammogram possible calcifications in the left breast at 1:00, posterior depth.  Left breast diagnostic mammo showed punctate and fine pleomorphic calcifications in  the left breast at 1:00, middle depth measuring up to 5 mm.     5/1/2023 stereotactic guided core biopsy showing grade 3 DCIS with comedonecrosis and calcifications.  ER(3+,> 95%), [place](2+, 70%)    5/8/2023     5/10/2023 Germline testing for BRCA1/BRCA2 pathogenic variant was not detected. NGS was negative for pathogenic variants of JOSE, BARD1, BRCA1, BRCA2, CDH1, CHEK2, NF1, PALB2, PTEN, STK11, TP53.     5/25/2023  RFID tag localized segmental mastectomy. Pathology showed 3mm of grade II DCIS. No invasive disease. Negative margins. pTis Nx Mx. Biomarkers not repeated     7/5/2023 -8/2/2023 received 4,240 cGy in 16 fractions to the left breast with an additional 1,000 cGy boost in 4 fractions     August 2023 start 5 mg of tamoxifen      Herpes simplex labialis 09/23/2013     Priority: Medium    Allergic Rhinitis      Priority: Medium    Irritable bowel syndrome      Priority: Medium    Fibromyalgia      Priority: Medium    Classical migraine      Priority: Medium    Panic disorder without agoraphobia      Priority: Medium    Insomnia      Priority: Medium          Current Medications:  Current Outpatient Medications   Medication Sig Dispense Refill    Cholecalciferol (VITAMIN D) 400 UNITS capsule Take 1 capsule by mouth daily.      hydrocortisone 2.5 % cream Apply topically 2 times daily 30 g 1    hydrOXYzine (VISTARIL) 25 MG capsule Take 1-2 capsules (25-50 mg) by mouth 3 times daily as needed for anxiety 90 capsule 1    LACTOBACILLUS PO       LORazepam (ATIVAN) 0.5 MG tablet Take 1 tablet (0.5 mg) by mouth 2 times daily as needed (Panic attack) 30 tablet 0    Magnesium Glycinate 100 MG CAPS Take 400 mg by mouth daily      mirtazapine (REMERON) 7.5 MG tablet Take 1 tablet (7.5 mg) by mouth at bedtime 30 tablet 4    MULTI-VITAMIN OR TABS Take 1 tablet by mouth daily  0    tamoxifen (NOLVADEX) 10 MG tablet Take 0.5 tablets (5 mg) by mouth daily 60 tablet 3    tretinoin (RETIN-A) 0.05 % external cream Apply  "topically At Bedtime      valACYclovir (VALTREX) 1000 mg tablet Take 2 tablets (2,000 mg) by mouth 2 times daily 4 tablet 11         ECOG Performance Status: 0    Physical Examination:  /78 (BP Location: Right arm, Patient Position: Right side, Cuff Size: Adult Regular)   Pulse 87   Temp 98.5  F (36.9  C) (Oral)   Resp 16   Wt 64 kg (141 lb 3.2 oz)   LMP 10/24/2007   SpO2 100%   BMI 22.62 kg/m    General:  Well appearing, well-nourished adult female in NAD.  HEENT:  Normocephalic.  Sclera anicteric.  MMM.  No lesions of the oropharynx.  Breast: Status post left breast lumpectomy.  Well-healed.  No palpable lymph nodes.  Lymph:  No cervical, supraclavicular, or axillary LAD.  Chest:  CTA bilaterally.  No wheezes or crackles.  CV:  RRR.  No murmurs or gallops  Abd:  Soft/NT/ND.  BS normoactive.  No hepatosplenomegaly.  Ext:  No pitting edema of the bilateral lower extremities.  Pulses 2+ and symmetric.  Musculo:  Strength 5/5 throughout.  Neuro:  Cranial nerves grossly intact.  Psych:  Mood and affect appear normal.      Laboratory Data:  Lab Results   Component Value Date    WBC 6.0 04/03/2008    HGB 12.5 04/03/2008    HCT 39.6 04/03/2008    MCV 92 04/03/2008     04/03/2008     No results found for: \"NA\", \"HCO3\", \"CREATININE\", \"BUN\", \"ANIONGAP\", \"GLUCOSE\"  No results found for: \"ALT\", \"AST\", \"GGT\", \"ALKPHOS\", \"BILITOT\"  No results found for: \"INR\", \"PT\"      Radiology data:  I have personally reviewed the images of / or the following radiology data: As detailed in the oncology timeline    Pathology and other data:  I have personally reviewed the following data: As detailed in the oncology timeline      Assessment and Recommendations  Luna Eugene  is a 53 year old postmenopausal woman with 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  She underwent a partial mastectomy, adjuvant radiation therapy, and has been on 5 mg of tamoxifen since August 2024.  She presents today for follow-up.    #Left " breast DCIS  - Continue 5 mg of tamoxifen for a total of 3 years -until August 2026  - Annual mammograms -reviewed hers from today which has no concerning findings per review with radiology    # Vaginal dryness  Discussed multiple options including trial of non-hormonal vaginal moisturizers (ie. Replens, HyaloGyn, Luvena, Revaree, vitamin E, coconut oil) for symptom management and vaginal lubricants (water/silicon/natural oil based) for intercourse  - If non-hormonal therapy fails to manage symptoms, 10-mcg vaginal estrogen tablet at the lowest frequency or the vaginal ring to alleviate symptoms can be considered    #Bone health  Recommend following up with her primary care physician regarding concerns of her bone density.  Discussed that tamoxifen does not negatively impact this, but actually will help with her bone density.  - Recommend continuing vitamin D3, adding in calcium, and continuing with weightbearing exercises        RTC with Ananya Amaral PA-C, PhD in 3 to 6 months and in a year with me      30 minutes spent on the date of the encounter doing chart review, review of test results, interpretation of tests, patient visit and documentation       Dame Karen MD   of Medicine  Division of Hematology, Oncology and Transplantation  [facility]

## 2024-06-10 ENCOUNTER — ANCILLARY PROCEDURE (OUTPATIENT)
Dept: MAMMOGRAPHY | Facility: CLINIC | Age: 54
End: 2024-06-10
Attending: PHYSICIAN ASSISTANT
Payer: COMMERCIAL

## 2024-06-10 ENCOUNTER — LAB (OUTPATIENT)
Dept: LAB | Facility: CLINIC | Age: 54
End: 2024-06-10
Attending: INTERNAL MEDICINE
Payer: COMMERCIAL

## 2024-06-10 ENCOUNTER — ONCOLOGY VISIT (OUTPATIENT)
Dept: ONCOLOGY | Facility: CLINIC | Age: 54
End: 2024-06-10
Attending: PHYSICIAN ASSISTANT
Payer: COMMERCIAL

## 2024-06-10 VITALS
TEMPERATURE: 98.5 F | OXYGEN SATURATION: 100 % | HEART RATE: 87 BPM | BODY MASS INDEX: 22.62 KG/M2 | RESPIRATION RATE: 16 BRPM | SYSTOLIC BLOOD PRESSURE: 117 MMHG | DIASTOLIC BLOOD PRESSURE: 78 MMHG | WEIGHT: 141.2 LBS

## 2024-06-10 DIAGNOSIS — D05.12 DUCTAL CARCINOMA IN SITU (DCIS) OF LEFT BREAST: ICD-10-CM

## 2024-06-10 DIAGNOSIS — N89.8 VAGINAL DRYNESS: ICD-10-CM

## 2024-06-10 DIAGNOSIS — Z12.31 VISIT FOR SCREENING MAMMOGRAM: ICD-10-CM

## 2024-06-10 DIAGNOSIS — D05.12 DUCTAL CARCINOMA IN SITU (DCIS) OF LEFT BREAST: Primary | ICD-10-CM

## 2024-06-10 LAB
ESTRADIOL SERPL-MCNC: <5 PG/ML
FSH SERPL IRP2-ACNC: 90 MIU/ML
LH SERPL-ACNC: 38.8 MIU/ML

## 2024-06-10 PROCEDURE — 99213 OFFICE O/P EST LOW 20 MIN: CPT | Performed by: INTERNAL MEDICINE

## 2024-06-10 PROCEDURE — 77067 SCR MAMMO BI INCL CAD: CPT | Mod: GC | Performed by: RADIOLOGY

## 2024-06-10 PROCEDURE — 77063 BREAST TOMOSYNTHESIS BI: CPT | Mod: GC | Performed by: RADIOLOGY

## 2024-06-10 PROCEDURE — 36415 COLL VENOUS BLD VENIPUNCTURE: CPT | Performed by: PATHOLOGY

## 2024-06-10 PROCEDURE — 82670 ASSAY OF TOTAL ESTRADIOL: CPT | Performed by: INTERNAL MEDICINE

## 2024-06-10 PROCEDURE — 99000 SPECIMEN HANDLING OFFICE-LAB: CPT | Performed by: PATHOLOGY

## 2024-06-10 PROCEDURE — 99214 OFFICE O/P EST MOD 30 MIN: CPT | Performed by: INTERNAL MEDICINE

## 2024-06-10 PROCEDURE — 83001 ASSAY OF GONADOTROPIN (FSH): CPT | Performed by: INTERNAL MEDICINE

## 2024-06-10 PROCEDURE — 83002 ASSAY OF GONADOTROPIN (LH): CPT | Performed by: INTERNAL MEDICINE

## 2024-06-10 RX ORDER — TAMOXIFEN CITRATE 10 MG/1
5 TABLET ORAL DAILY
Qty: 60 TABLET | Refills: 3 | Status: SHIPPED | OUTPATIENT
Start: 2024-06-10

## 2024-06-10 ASSESSMENT — PAIN SCALES - GENERAL: PAINLEVEL: NO PAIN (0)

## 2024-06-10 NOTE — LETTER
6/10/2024      Luna Eugene  [address]  [place] [address]      Dear Colleague,    Thank you for referring your patient, Luna Eugene, to the [facility]. Please see a copy of my visit note below.    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  She underwent a partial mastectomy which demonstrated pTis disease.  She is s/p adjuvant radiation therapy.  Germline testing was negative.  She was started on 5 mg of tamoxifen in August 2024 and presents today for follow-up.      Interval History  Luna is accompanied by her  today and presents for follow-up.  Since her last visit with me, she has overall been feeling well without any significant issues on 5 mg of tamoxifen.    She is considering starting Viviscal and collagen supplement and wonders if this is okay while she is on tamoxifen.      She also has concerns regarding her overall bone density.  She notes her mother and maternal grandmother have significant osteoporosis and wants to do everything she can to minimize her risk of this.  She is currently taking vitamin D3, but is not on any calcium.  She is exercising fairly regularly including with weightbearing exercises.    She also had concerns regarding vaginal dryness, irritation, some dyspareunia.  She wonders if she can safely take vaginal estrogen.  She is currently not regularly utilizing any products.    She has otherwise been feeling well without any headaches, visual changes, cough, SOB, chest pain, n/v, constipation/diarrhea, abdominal pain, focal weakness or numbness.         Her full oncologic history is as follows:   Oncologic History  Patient Active Problem List    Diagnosis Date Noted    Ductal carcinoma in situ (DCIS) of left breast 05/16/2023     Priority: High     Check 1.24 f/u  [facility]    Left breast DCIS  4/12/2022 last screening mammogram normal    4/19/2023 screening mammogram possible calcifications in the left breast at 1:00, posterior depth.  Left breast diagnostic mammo showed punctate and fine pleomorphic calcifications in the left breast at 1:00, middle depth measuring up to 5 mm.     5/1/2023 stereotactic guided core biopsy showing grade 3 DCIS with comedonecrosis and calcifications.  ER(3+,> 95%), [place](2+, 70%)    5/8/2023     5/10/2023 Germline testing for BRCA1/BRCA2 pathogenic variant was not detected. NGS was negative for pathogenic variants of JOSE, BARD1, BRCA1, BRCA2, CDH1, CHEK2, NF1, PALB2, PTEN, STK11, TP53.     5/25/2023  RFID tag localized segmental mastectomy. Pathology showed 3mm of grade II DCIS. No invasive disease. Negative margins. pTis Nx Mx. Biomarkers not repeated     7/5/2023 -8/2/2023 received 4,240 cGy in 16 fractions to the left breast with an additional 1,000 cGy boost in 4 fractions     August 2023 start 5 mg of tamoxifen      Herpes simplex labialis 09/23/2013     Priority: Medium    Allergic Rhinitis      Priority: Medium    Irritable bowel syndrome      Priority: Medium    Fibromyalgia      Priority: Medium    Classical migraine      Priority: Medium    Panic disorder without agoraphobia      Priority: Medium    Insomnia      Priority: Medium          Current Medications:  Current Outpatient Medications   Medication Sig Dispense Refill    Cholecalciferol (VITAMIN D) 400 UNITS capsule Take 1 capsule by mouth daily.      hydrocortisone 2.5 % cream Apply topically 2 times daily 30 g 1    hydrOXYzine (VISTARIL) 25 MG capsule Take 1-2 capsules (25-50 mg) by mouth 3 times daily as needed for anxiety 90 capsule 1    LACTOBACILLUS PO       LORazepam (ATIVAN) 0.5 MG tablet Take 1 tablet (0.5 mg) by mouth 2 times daily as needed (Panic attack) 30 tablet 0    Magnesium Glycinate 100 MG CAPS Take 400 mg by mouth daily      mirtazapine (REMERON) 7.5 MG tablet  "Take 1 tablet (7.5 mg) by mouth at bedtime 30 tablet 4    MULTI-VITAMIN OR TABS Take 1 tablet by mouth daily  0    tamoxifen (NOLVADEX) 10 MG tablet Take 0.5 tablets (5 mg) by mouth daily 60 tablet 3    tretinoin (RETIN-A) 0.05 % external cream Apply topically At Bedtime      valACYclovir (VALTREX) 1000 mg tablet Take 2 tablets (2,000 mg) by mouth 2 times daily 4 tablet 11         ECOG Performance Status: 0    Physical Examination:  /78 (BP Location: Right arm, Patient Position: Right side, Cuff Size: Adult Regular)   Pulse 87   Temp 98.5  F (36.9  C) (Oral)   Resp 16   Wt 64 kg (141 lb 3.2 oz)   LMP 10/24/2007   SpO2 100%   BMI 22.62 kg/m    General:  Well appearing, well-nourished adult female in NAD.  HEENT:  Normocephalic.  Sclera anicteric.  MMM.  No lesions of the oropharynx.  Breast: Status post left breast lumpectomy.  Well-healed.  No palpable lymph nodes.  Lymph:  No cervical, supraclavicular, or axillary LAD.  Chest:  CTA bilaterally.  No wheezes or crackles.  CV:  RRR.  No murmurs or gallops  Abd:  Soft/NT/ND.  BS normoactive.  No hepatosplenomegaly.  Ext:  No pitting edema of the bilateral lower extremities.  Pulses 2+ and symmetric.  Musculo:  Strength 5/5 throughout.  Neuro:  Cranial nerves grossly intact.  Psych:  Mood and affect appear normal.      Laboratory Data:  Lab Results   Component Value Date    WBC 6.0 04/03/2008    HGB 12.5 04/03/2008    HCT 39.6 04/03/2008    MCV 92 04/03/2008     04/03/2008     No results found for: \"NA\", \"HCO3\", \"CREATININE\", \"BUN\", \"ANIONGAP\", \"GLUCOSE\"  No results found for: \"ALT\", \"AST\", \"GGT\", \"ALKPHOS\", \"BILITOT\"  No results found for: \"INR\", \"PT\"      Radiology data:  I have personally reviewed the images of / or the following radiology data: As detailed in the oncology timeline    Pathology and other data:  I have personally reviewed the following data: As detailed in the oncology timeline      Assessment and Recommendations  Luna Eugene  " is a 53 year old postmenopausal woman with 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  She underwent a partial mastectomy, adjuvant radiation therapy, and has been on 5 mg of tamoxifen since August 2024.  She presents today for follow-up.    #Left breast DCIS  - Continue 5 mg of tamoxifen for a total of 3 years -until August 2026  - Annual mammograms -reviewed hers from today which has no concerning findings per review with radiology    # Vaginal dryness  Discussed multiple options including trial of non-hormonal vaginal moisturizers (ie. Replens, HyaloGyn, Luvena, Revaree, vitamin E, coconut oil) for symptom management and vaginal lubricants (water/silicon/natural oil based) for intercourse  - If non-hormonal therapy fails to manage symptoms, 10-mcg vaginal estrogen tablet at the lowest frequency or the vaginal ring to alleviate symptoms can be considered    #Bone health  Recommend following up with her primary care physician regarding concerns of her bone density.  Discussed that tamoxifen does not negatively impact this, but actually will help with her bone density.  - Recommend continuing vitamin D3, adding in calcium, and continuing with weightbearing exercises        RTC with Ananya Amaral PA-C, PhD in 3 to 6 months and in a year with me      30 minutes spent on the date of the encounter doing chart review, review of test results, interpretation of tests, patient visit and documentation       Dame Karen MD   of Medicine  Division of Hematology, Oncology and Transplantation  [facility]

## 2024-06-10 NOTE — NURSING NOTE
"Oncology Rooming Note    Charisse 10, 2024 10:25 AM   Luna Eugene is a 53 year old female who presents for:    Chief Complaint   Patient presents with    Oncology Clinic Visit     RTN for Breast Cancer     Initial Vitals: /78 (BP Location: Right arm, Patient Position: Right side, Cuff Size: Adult Regular)   Pulse 87   Temp 98.5  F (36.9  C) (Oral)   Resp 16   Wt 64 kg (141 lb 3.2 oz)   LMP 10/24/2007   SpO2 100%   BMI 22.62 kg/m   Estimated body mass index is 22.62 kg/m  as calculated from the following:    Height as of 9/28/23: 1.683 m (5' 6.25\").    Weight as of this encounter: 64 kg (141 lb 3.2 oz). Body surface area is 1.73 meters squared.  No Pain (0) Comment: Data Unavailable   Patient's last menstrual period was 10/24/2007.  Allergies reviewed: Yes  Medications reviewed: Yes    Medications: Medication refills not needed today.  Pharmacy name entered into EPIC:    [id number] - [place], [place] - [address]  [id number] - [place], [place] - [address] [id number] - [place] - [address]  [facility] [place], [place] - [phone]    Frailty Screening:   Is the patient here for a new oncology consult visit in cancer care? 2. No      Clinical concerns: none       Lidia Chaudhry MA             "

## 2024-06-10 NOTE — PATIENT INSTRUCTIONS
Trial of non-hormonal vaginal moisturizers (ie.  vitamin E, coconut oil, Replens, HyaloGyn, Luvena, Revaree) for symptom management and vaginal lubricants (water/silicon/natural oil based) for intercourse    If non-hormonal therapy fails to manage symptoms, 10-mcg vaginal estrogen tablet at the lowest frequency or the vaginal ring to alleviate symptoms may be used.

## 2024-06-19 ENCOUNTER — PATIENT OUTREACH (OUTPATIENT)
Dept: ONCOLOGY | Facility: CLINIC | Age: 54
End: 2024-06-19
Payer: COMMERCIAL

## 2024-06-19 NOTE — PROGRESS NOTES
[facility]: Cancer Care                                                                                          Chart audit to assign care coordination enrollment status    Signature:  Jeanne Bernstein RN

## 2024-08-05 DIAGNOSIS — F41.9 ANXIETY: ICD-10-CM

## 2024-08-05 RX ORDER — MIRTAZAPINE 7.5 MG/1
7.5 TABLET, FILM COATED ORAL AT BEDTIME
Qty: 30 TABLET | Refills: 4 | Status: SHIPPED | OUTPATIENT
Start: 2024-08-05 | End: 2024-08-20

## 2024-08-05 NOTE — TELEPHONE ENCOUNTER
Received geolad message from patient requesting refill of mirtazapine.     Last office visit: 3/12/24  Scheduled for follow up 8/20/24     Will route request to NP for review.

## 2024-08-19 NOTE — PROGRESS NOTES
"Palliative Care Progress Note    Patient Name: Luna Eugene  Primary Provider: Lisa Jack    Chief Complaint/Patient ID: Luna Eugene is a 53 year old female with PMHx of DCIS, s/p partial mastectomy and localized RT. On tamoxifen.  -Worsening anxiety in setting of cancer dx/treatment.    Last Palliative care appointment: 3/12/24 with me.     [place]  Checked: Yes    Social History: .  2 daughters.  Has a dog.  (currently third grade).     Advanced Care Planning: Has not completed.  and daughters would be HCAs. Provided [place] Cho[place] info 9/12/23.    Interim History:  Luna Eugene is a 53 year old female who is seen today for follow up with Palliative Care via billable video visit.      Reviewed Oncology note from 6/10. Imaging looks good. Planning to continue 5mg Tamoxifen through Aug 2026. Also discussed symptomatic management of vaginal dryness.    Overall says that she is doing \"pretty good\". She still has some days where she wakes up feeling anxious, about once per week.  Distraction does help.  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  Her district shows one of the harder options, and it sounds like most of this training will be expected to occur outside of classroom and PD time.  She is trying to not get too anxious about this until she knows more information, however she is worried about this being disruptive to the routine that she has established that helps her anxiety she also notes some days where she feels \"flat\" and does not like herself.  This normally happens more on gray or gloomy days.  Continues to meet with therapist at [facility] in [place]. Recently spread out visits to 2 months- has an appt today.    Sleep has improved with the Remeron as has her appetite.  She " "states she does still wake up due to hot flashes at night, however she usually is able to fall back asleep easily.  Hot flashes do not happen every night, and they rarely cause her to kick off the covers anymore.  Says these episodes go \"in spurts\".    She is still trying to walk 3 to 5 miles per day, even on colder days.    Physical Exam:   Constitutional: Alert, pleasant, no apparent distress. Sitting up in chair.  Eyes: Sclera non-icteric, no eye discharge.  ENT: No nasal discharge. Ears grossly normal.  Respiratory: Unlabored respirations. Speaking in full sentences.  Musculoskeletal: Extremities appear normal- no gross deformities noted. No edema noted on upper body.   Skin: No suspicious lesions or rashes on visible skin.  Neurologic: Clear speech, no aphasia. No facial droop.  Psychiatric: Mentation appears normal, appropriate attention. Affect normal/bright. Does not appear anxious or depressed.    Key Data Reviewed:  IMAGING: Left Diagnostic Mammogram w/ russel 6/10/24- Findings:     Posttreatment changes of left breast conservation therapy. No suspicious abnormality in the left breast.                                                                   IMPRESSION: BI-RADS CATEGORY: 2 - Benign.      Impression & Recommendations & Counseling:  Luna Eugene is a 53 year old female with history of DCIS, s/p partial mastectomy and localized RT.      Anxiety  Sleep - Improved with Remeron.  She is having some increased anxiety, which seems situational.  However, we did discuss that these situations are likely to continue, which makes me concerned that anxiety will be a more present entity.  -Continue following with mindfulness therapist.  -Continue Remeron 7.5 mg daily for now.  We did discuss the possibility of a dose increase in the future if needed.  Would still have sleep and appetite benefit up to 15 mg.  -If we increase the dose of Remeron, I had want a repeat an EKG about a month later.  -Continue " hydroxyzine 25-50mg TID PRN-has not needed in several months.  -Continue with regular exercise and breathing focus.      Hot Flashes - States they are not happening every night and generally are better from our prior visit.  She did previously have a prescription for gabapentin, however she never took it.    -If she feels that hot flashes are significant enough to warrant a trial of medication, would start gabapentin.  Would likely be okay to start with 300 mg, however she like to be more cautious, we could send 100 mg capsules.    Follow up: 6 months      Video-Visit Details  Video Start Time: 9:23 AM  Video End Time: 9:50 AM    Originating Location (pt. Location): Home     Distant Location (provider location):  Offsite- Personal Home      Platform used for Video Visit: Ricardo     Total time spent on day of encounter is 34 mins, including reviewing record, review of above studies, above visit with patient, symptomatic discussion as above, including medication adjustments/prescription management, and documentation.       Elizabeth Wood DO  Palliative Medicine   [id number]    Some chart documentation performed using Dragon Voice recognition Software. Although reviewed after completion, some words and grammatical errors may remain.

## 2024-08-20 ENCOUNTER — VIRTUAL VISIT (OUTPATIENT)
Dept: PALLIATIVE CARE | Facility: CLINIC | Age: 54
End: 2024-08-20
Attending: STUDENT IN AN ORGANIZED HEALTH CARE EDUCATION/TRAINING PROGRAM
Payer: COMMERCIAL

## 2024-08-20 VITALS — BODY MASS INDEX: 22.5 KG/M2 | WEIGHT: 140 LBS | HEIGHT: 66 IN

## 2024-08-20 DIAGNOSIS — T45.1X5A HOT FLASHES DUE TO TAMOXIFEN: ICD-10-CM

## 2024-08-20 DIAGNOSIS — D05.12 DUCTAL CARCINOMA IN SITU (DCIS) OF LEFT BREAST: ICD-10-CM

## 2024-08-20 DIAGNOSIS — F41.9 ANXIETY: Primary | ICD-10-CM

## 2024-08-20 DIAGNOSIS — G47.9 DIFFICULTY SLEEPING: ICD-10-CM

## 2024-08-20 DIAGNOSIS — R23.2 HOT FLASHES DUE TO TAMOXIFEN: ICD-10-CM

## 2024-08-20 PROCEDURE — 99214 OFFICE O/P EST MOD 30 MIN: CPT | Mod: 95 | Performed by: STUDENT IN AN ORGANIZED HEALTH CARE EDUCATION/TRAINING PROGRAM

## 2024-08-20 RX ORDER — MIRTAZAPINE 7.5 MG/1
7.5 TABLET, FILM COATED ORAL AT BEDTIME
Qty: 30 TABLET | Refills: 4 | Status: SHIPPED | OUTPATIENT
Start: 2024-08-20

## 2024-08-20 ASSESSMENT — PAIN SCALES - GENERAL: PAINLEVEL: NO PAIN (0)

## 2024-08-20 NOTE — NURSING NOTE
Patient confirms medications and allergies are accurate via patients echeck in completion, and or denies any changes since last reviewed/verified.     Current patient location: [address] [place] [id number]    Is the patient currently in the state of [place]? YES    Visit mode:VIDEO    If the visit is dropped, the patient can be reconnected by: VIDEO VISIT: Text to cell phone:   Telephone Information:   Mobile [phone]       Will anyone else be joining the visit? NO  (If patient encounters technical issues they should call [phone])    How would you like to obtain your AVS? MyChart    Are changes needed to the allergy or medication list? No    Are refills needed on medications prescribed by this physician? YES Mirtazapine 7.5mg    Rooming Documentation:  Questionnaire(s) completed      Reason for visit: RECHECK    Suzy THAKKAR

## 2024-08-20 NOTE — PATIENT INSTRUCTIONS
Recommendations:  -We also that if you feel anxiety is starting to become more pervasive, we could increase the Remeron to 15 mg.  This dose can help with anxiety but also not lose the benefits of sleep and appetite improvement.   -If we do increase the dose, we'll want to get an EKG about a month after the change.  -Consider asking PCP if medications for hot flashes, like gabapentin, can be taken on an as needed basis.    Follow up: 6 months      Reasons to Call    If you are having worsening/uncontrolled symptoms we want you to call!    You or your other physicians make any changes to medications we have prescribed.  -Please call for refills 4-5 days before you will run out of medication.    Important Phone Numbers, including: Refills, scheduling, and general questions     Palliative Care RN: Faustina Faith : [phone]  *For scheduling needs/follow up visits : [phone]  *After hours or on weekends- Will connect you with on call MD : [phone].

## 2024-08-20 NOTE — LETTER
"8/20/2024       RE: Luna Eugene  [address] [place]  [place] [address]     Dear Colleague,    Thank you for referring your patient, Luna Eugene, [facility]. Please see a copy of my visit note below.    Palliative Care Progress Note    Patient Name: Luna Eugene  Primary Provider: Lisa Jack    Chief Complaint/Patient ID: Luna Eugene is a 53 year old female with PMHx of DCIS, s/p partial mastectomy and localized RT. On tamoxifen.  -Worsening anxiety in setting of cancer dx/treatment.    Last Palliative care appointment: 3/12/24 with me.     [place]  Checked: Yes    Social History: .  2 daughters.  Has a dog.  (currently third grade).     Advanced Care Planning: Has not completed.  and daughters would be HCAs. Provided [place] info 9/12/23.    Interim History:  Luna Eugene is a 53 year old female who is seen today for follow up with Palliative Care via billable video visit.      Reviewed Oncology note from 6/10. Imaging looks good. Planning to continue 5mg Tamoxifen through Aug 2026. Also discussed symptomatic management of vaginal dryness.    Overall says that she is doing \"pretty good\". She still has some days where she wakes up feeling anxious, about once per week.  Distraction does help.  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  Her district shows one of the harder options, and it sounds like most of this training will be expected to occur outside of classroom and PD time.  She is trying to not get too anxious about this until she knows more information, however she is worried about this being disruptive to the routine that she has established that helps her anxiety she also notes " "some days where she feels \"flat\" and does not like herself.  This normally happens more on gray or gloomy days.  Continues to meet with therapist at [facility] in [place]. Recently spread out visits to 2 months- has an appt today.    Sleep has improved with the Remeron as has her appetite.  She states she does still wake up due to hot flashes at night, however she usually is able to fall back asleep easily.  Hot flashes do not happen every night, and they rarely cause her to kick off the covers anymore.  Says these episodes go \"in spurts\".    She is still trying to walk 3 to 5 miles per day, even on colder days.    Physical Exam:   Constitutional: Alert, pleasant, no apparent distress. Sitting up in chair.  Eyes: Sclera non-icteric, no eye discharge.  ENT: No nasal discharge. Ears grossly normal.  Respiratory: Unlabored respirations. Speaking in full sentences.  Musculoskeletal: Extremities appear normal- no gross deformities noted. No edema noted on upper body.   Skin: No suspicious lesions or rashes on visible skin.  Neurologic: Clear speech, no aphasia. No facial droop.  Psychiatric: Mentation appears normal, appropriate attention. Affect normal/bright. Does not appear anxious or depressed.    Key Data Reviewed:  IMAGING: Left Diagnostic Mammogram w/ rsusel 6/10/24- Findings:     Posttreatment changes of left breast conservation therapy. No suspicious abnormality in the left breast.                                                                   IMPRESSION: BI-RADS CATEGORY: 2 - Benign.      Impression & Recommendations & Counseling:  Luna Eugene is a 53 year old female with history of DCIS, s/p partial mastectomy and localized RT.      Anxiety  Sleep - Improved with Remeron.  She is having some increased anxiety, which seems situational.  However, we did discuss that these situations are likely to continue, which makes me concerned that anxiety will be a more present entity.  -Continue following " with mindfulness therapist.  -Continue Remeron 7.5 mg daily for now.  We did discuss the possibility of a dose increase in the future if needed.  Would still have sleep and appetite benefit up to 15 mg.  -If we increase the dose of Remeron, I had want a repeat an EKG about a month later.  -Continue hydroxyzine 25-50mg TID PRN-has not needed in several months.  -Continue with regular exercise and breathing focus.      Hot Flashes - States they are not happening every night and generally are better from our prior visit.  She did previously have a prescription for gabapentin, however she never took it.    -If she feels that hot flashes are significant enough to warrant a trial of medication, would start gabapentin.  Would likely be okay to start with 300 mg, however she like to be more cautious, we could send 100 mg capsules.    Follow up: 6 months      Video-Visit Details  Video Start Time: 9:23 AM  Video End Time: 9:50 AM    Originating Location (pt. Location): Home     Distant Location (provider location):  Offsite- Personal Home      Platform used for Video Visit: [employer] [place]     Total time spent on day of encounter is 34 mins, including reviewing record, review of above studies, above visit with patient, symptomatic discussion as above, including medication adjustments/prescription management, and documentation.       Elizabeth Wood DO  Palliative Medicine   AMCOM ID [id number]    Some chart documentation performed using Dragon Voice recognition Software. Although reviewed after completion, some words and grammatical errors may remain.      Again, thank you for allowing me to participate in the care of your patient.      Sincerely,    Elizabeth Wood DO

## 2024-12-05 NOTE — PROGRESS NOTES
"CANCER SURVIVORSHIP VISIT  Dec 10, 2024    Care Team   Primary Care Provider Lisa Jack   Surgeon  Bernadine oCon MD    Radiation Oncologist Cleo Campoverde MD    Medical Oncologist  Dame Jones MD        REASON FOR VISIT: survivorship visit for history of breast cancer- end of treatment and sexual health visit     CANCER HISTORY AND TREATMENT:    History of left-sided stage 0 breast cancer, ER/AR positive, diagnosed in   *Diagnosis: 2023. 53 years old. Diagnosed on mammogram.   *Surgery: 2023 Left lumpectomy. DCIS, grade 2  *Radiation:   Body Area Treated 4240 cGy to left breast + 1000 cGy left breast boost   End Date (year) 2023 - 2023   *Endocrine therapy: 5 mg of tamoxifen in 2023  *Genetic testin2023 Germline testing for BRCA1/BRCA2 pathogenic variant was not detected. NGS was negative for pathogenic variants of JOSE, BARD1, BRCA1, BRCA2, CDH1, CHEK2, NF1, PALB2, PTEN, STK11, TP53.    *Other information:      INTERVAL HISTORY:     Luna was referred by Dr. Jones for a survivorship visit. Given she is not yet 5 years from diagnosis, this visit will serve as an \"end of treatment\" visit and to focus on sexual health.     She has been on Tamoxifen over a year. She has had more vaginal discharge. No menses for 10+ years due to mirena or other birth control. May 2023 stopped OCP. Hormones have looked post menopausal. Long standing history with BV. Feel like BV now for over a month. Physical end of October, pap and wet prep negative. 2 weeks later, burning, burning with intercourse, itching. Had spotting. Have an appt at [facility] later today with US.     No new lumps/bumps or new pain.     Late effects: Left chest tightness    Mood: Feeling frustrated. More anxiety. Affect more flat. Switched from setriline to mortazipine. Prescibed by Elizabeth Morales. Sees a mindfulness based therapist.     Screened for sexual health concerns: no issues outside of " BV and current symptoms    Sleep: good        Past Medical History:   Diagnosis Date    Allergic rhinitis     Anxiety     Classic migraines     Ductal carcinoma in situ 05/2023    Fibromyalgia     GERD (gastroesophageal reflux disease)     Insomnia     Irritable bowel syndrome     Kidney stones     Panic disorder without agoraphobia        Current Outpatient Medications   Medication Sig Dispense Refill    Cholecalciferol (VITAMIN D) 400 UNITS capsule Take 1 capsule by mouth daily.      hydrocortisone 2.5 % cream Apply topically 2 times daily 30 g 1    hydrOXYzine (VISTARIL) 25 MG capsule Take 1-2 capsules (25-50 mg) by mouth 3 times daily as needed for anxiety 90 capsule 1    LACTOBACILLUS PO       LORazepam (ATIVAN) 0.5 MG tablet Take 1 tablet (0.5 mg) by mouth 2 times daily as needed (Panic attack) 30 tablet 0    Magnesium Glycinate 100 MG CAPS Take 400 mg by mouth daily      mirtazapine (REMERON) 7.5 MG tablet Take 1 tablet (7.5 mg) by mouth at bedtime 30 tablet 4    MULTI-VITAMIN OR TABS Take 1 tablet by mouth daily  0    tamoxifen (NOLVADEX) 10 MG tablet Take 0.5 tablets (5 mg) by mouth daily 60 tablet 3    tretinoin (RETIN-A) 0.05 % external cream Apply topically At Bedtime      valACYclovir (VALTREX) 1000 mg tablet Take 2 tablets (2,000 mg) by mouth 2 times daily 4 tablet 11          Allergies   Allergen Reactions    No Known Allergies        Family History   Problem Relation Age of Onset    Cancer Mother 67        leukemia     C.A.D. Maternal Grandmother 80    Cerebrovascular Disease Maternal Grandmother     Neurologic Disorder Maternal Grandmother         parkinson's    Gastrointestinal Disease Maternal Grandmother     Osteoporosis Maternal Grandmother     Eye Disorder Maternal Grandfather         cataracts    C.A.D. Paternal Grandmother 80    Hypertension Paternal Grandmother     Alzheimer Disease Paternal Grandmother     Musculoskeletal Disorder Paternal Grandmother     Arthritis Paternal Grandfather          Social history:  Living situation: , lives in [place]. 2 daughters and dog.   Occupation:   Tobacco use:   Tobacco Use      Smoking status: Never        Passive exposure: Never      Smokeless tobacco: Never  ETOH use: 0-2 drinks a week  Exercise: Getting over 150 min of exercise  Diet:     Physical exam  /70 (BP Location: Right arm, Patient Position: Sitting, Cuff Size: Adult Regular)   Pulse 87   Temp 98  F (36.7  C) (Oral)   Resp 18   Wt 61.7 kg (136 lb 1.6 oz)   LMP 10/24/2007   SpO2 98%   BMI 21.80 kg/m    Wt Readings from Last 4 Encounters:   08/20/24 63.5 kg (140 lb)   06/10/24 64 kg (141 lb 3.2 oz)   12/20/23 65.2 kg (143 lb 11.2 oz)   09/28/23 64.4 kg (142 lb)      General: No acute distress.    HEENT: Sclera anicteric.   Lymph: No lymphadenopathy in neck, supraclavicular, and axillary areas  Heart: RRR  Lungs: Clear to ascultation bilaterally  Breasts: Dense. No asymmetry. Nipples everted. No palpable masses.   Extremities: no lower extremity edema  Skin: No rash on exposed skin  Neuro: Cranial nerves grossly intact       IMPRESSION/PLAN:    History of left-sided stage 0 breast cancer, ER/WI positive, diagnosed in 2023  1 year out from diagnosis without evidence of recurrence, discussed risk of ER+ recurrence persists  Low risk for late effects  Plan for 3 years of low dose Tamoxifen per ROSENBERG-1  Annual mammogram and follow up with oncology every 6 months or as per her oncologist. She will be transferring care from Dr. Jones to Dr. Arizmendi. She requests to see me in 1 year if Dr. Arizmendi is ok with alternating visits.     Potential late effects related to surgery/radiation:  -Risk of lymphedema: If she notices any swelling in her arm, she should be offered a referral to lymphedema physical therapy.   - Possible radiation side effects include cardiotoxicity, lung injury, fracture, and second malignancies. She should seek medical attention if she notices any new  skin lesions in the area of radiation. If she should develop any shortness of breath, hemoptysis, cough, or new pain, I would advise further evaluation.    Dyspareunia  She is seeing GYN later today but I suspect if her symptoms are not BV then she could be having some GSM from stopping HRT and Tamoxifen. We had a risk benefit discussion about topical estrogen and I told her from an oncology standpoint we are ok with her trialing estrace or vagifem. We did discuss coconut oil but with her BV history I don't think this is a good option. Hyaluric acid products could be tried too but given she is low risk and is on Tamoxifen, vaginal estrogen is a reasonable option given her struggles over the last month or so.     Potential effects related to endocrine therapy:  -Risk of endometrial cancer. Risk is very low to negligible with low dose Tamoxifen. Routine gynecologic care. Monitor for abnormal uterine bleeding. She will be seeing GYN for endometrial US today.      -Risk of VTE.  Risk is very low to negligible with low dose Tamoxifen. Recommend VTE prophylaxis in setting of high risk VTE surgeries.Recommend diet and exercise to maintain a normal BMI and management of blood pressure. She should seek medical evaluation promptly if she develop signs and symptoms concerning for DVT/PE     General late effects screenings and recommendations    -Cancer screening. She should undergo routine screening for women in her age group. She will have a Pap and pelvic exam as directed by her primary care provider (or annually if on Tamoxifen).     -Healthy lifestyle. She should maintain a healthy weight with a BMI between 20-25. She should exercise at least 150 minutes weekly at moderate intensity. She should see the eye doctor every 1-2 years, and dentist every 6 months for cleanings. She should not use any tobacco. She should minimize alcohol intake. If continuing to drink, should follow CDC recommendations for no more than 1 alcoholic  drink/day for women.    Non-urgent scheduling should be complete within 7-10 business days. However, if you have not received a phone call from scheduling within 3 business days, you may call to schedule at [phone] option 5, option 2. This is the same number to call to make changes tor if you have questions about the schedule.    Gave resources for our [facility] Cancer Survivorship class series and mailings list for educational opportunities. [email or web address]    Cancer treatment summary was sent electronically to the patient and her PCP.      The total time of this encounter amounted to 60 minutes.This time included time spent with the patient, prep work, ordering tests, creating a cancer treatment summary, and performing post visit documentation.    Ananya Amaral, PhD, MPAS, PA-C  [facility] Survivorship [facility]

## 2024-12-10 ENCOUNTER — ONCOLOGY SURVIVORSHIP VISIT (OUTPATIENT)
Dept: ONCOLOGY | Facility: CLINIC | Age: 54
End: 2024-12-10
Attending: PHYSICIAN ASSISTANT
Payer: COMMERCIAL

## 2024-12-10 VITALS
WEIGHT: 136.1 LBS | DIASTOLIC BLOOD PRESSURE: 70 MMHG | RESPIRATION RATE: 18 BRPM | HEART RATE: 87 BPM | TEMPERATURE: 98 F | BODY MASS INDEX: 21.8 KG/M2 | SYSTOLIC BLOOD PRESSURE: 111 MMHG | OXYGEN SATURATION: 98 %

## 2024-12-10 DIAGNOSIS — D05.12 DUCTAL CARCINOMA IN SITU (DCIS) OF LEFT BREAST: Primary | ICD-10-CM

## 2024-12-10 PROCEDURE — 99213 OFFICE O/P EST LOW 20 MIN: CPT | Performed by: PHYSICIAN ASSISTANT

## 2024-12-10 PROCEDURE — 99215 OFFICE O/P EST HI 40 MIN: CPT | Performed by: PHYSICIAN ASSISTANT

## 2024-12-10 PROCEDURE — 99417 PROLNG OP E/M EACH 15 MIN: CPT | Performed by: PHYSICIAN ASSISTANT

## 2024-12-10 RX ORDER — CLASCOTERONE 1 G/100G
CREAM TOPICAL PRN
COMMUNITY
Start: 2023-08-03

## 2024-12-10 ASSESSMENT — PAIN SCALES - GENERAL: PAINLEVEL_OUTOF10: NO PAIN (0)

## 2024-12-10 NOTE — NURSING NOTE
"Oncology Rooming Note    December 10, 2024 8:47 AM   Luna Eugene is a 54 year old female who presents for:    Chief Complaint   Patient presents with    Oncology Clinic Visit     Survivorship visit; Ductal carcinoma in situ (DCIS) of left breast     Initial Vitals: /70 (BP Location: Right arm, Patient Position: Sitting, Cuff Size: Adult Regular)   Pulse 87   Temp 98  F (36.7  C) (Oral)   Resp 18   Wt 61.7 kg (136 lb 1.6 oz)   LMP 10/24/2007   SpO2 98%   BMI 21.80 kg/m   Estimated body mass index is 21.8 kg/m  as calculated from the following:    Height as of 8/20/24: 1.683 m (5' 6.25\").    Weight as of this encounter: 61.7 kg (136 lb 1.6 oz). Body surface area is 1.7 meters squared.  No Pain (0) Comment: Data Unavailable   Patient's last menstrual period was 10/24/2007.  Allergies reviewed: Yes  Medications reviewed: Yes    Medications: Medication refills not needed today.  Pharmacy name entered into EPIC:    [id number] - [place], [place] - [address]  [id number] - [place], [place] - [address] [id number] - [place], [place] - [address]  [facility] [place], [place] - [phone]    Frailty Screening:   Is the patient here for a new oncology consult visit in cancer care? 2. No      Clinical concerns: Patient will be going to an OB/GYN later today due to some discharge and spotting. They will be doing an ultrasound and biopsy of the uterus.        Jessy Quinteros, EMT            "

## 2024-12-10 NOTE — LETTER
"12/10/2024      Luna Eugene  [address] [place] [address]      Dear Colleague,    Thank you for referring your patient, Luna Eugene, to the [facility]. Please see a copy of my visit note below.    CANCER SURVIVORSHIP VISIT  Dec 10, 2024    Care Team   Primary Care Provider Lisa Jack   Surgeon  Bernadine Coon MD    Radiation Oncologist Cleo Campoverde MD    Medical Oncologist  Dame Jones MD        REASON FOR VISIT: survivorship visit for history of breast cancer- end of treatment and sexual health visit     CANCER HISTORY AND TREATMENT:    History of left-sided stage 0 breast cancer, ER/VT positive, diagnosed in   *Diagnosis: 2023. 53 years old. Diagnosed on mammogram.   *Surgery: 2023 Left lumpectomy. DCIS, grade 2  *Radiation:   Body Area Treated 4240 cGy to left breast + 1000 cGy left breast boost   End Date (year) 2023 - 2023   *Endocrine therapy: 5 mg of tamoxifen in 2023  *Genetic testin2023 Germline testing for BRCA1/BRCA2 pathogenic variant was not detected. NGS was negative for pathogenic variants of JOSE, BARD1, BRCA1, BRCA2, CDH1, CHEK2, NF1, PALB2, PTEN, STK11, TP53.    *Other information:      INTERVAL HISTORY:     Luna was referred by Dr. Jones for a survivorship visit. Given she is not yet 5 years from diagnosis, this visit will serve as an \"end of treatment\" visit and to focus on sexual health.     She has been on Tamoxifen over a year. She has had more vaginal discharge. No menses for 10+ years due to mirena or other birth control. May 2023 stopped OCP. Hormones have looked post menopausal. Long standing history with BV. Feel like BV now for over a month. Physical end of October, pap and wet prep negative. 2 weeks later, burning, burning with intercourse, itching. Had spotting. Have an appt at [facility] later today with US.     No new lumps/bumps or new pain.     Late effects: Left chest " tightness    Mood: Feeling frustrated. More anxiety. Affect more flat. Switched from setriline to mortazipine. Prescibed by Elizabeth Morales. Sees a mindfulness based therapist.     Screened for sexual health concerns: no issues outside of BV and current symptoms    Sleep: good        Past Medical History:   Diagnosis Date     Allergic rhinitis      Anxiety      Classic migraines      Ductal carcinoma in situ 05/2023     Fibromyalgia      GERD (gastroesophageal reflux disease)      Insomnia      Irritable bowel syndrome      Kidney stones      Panic disorder without agoraphobia        Current Outpatient Medications   Medication Sig Dispense Refill     Cholecalciferol (VITAMIN D) 400 UNITS capsule Take 1 capsule by mouth daily.       hydrocortisone 2.5 % cream Apply topically 2 times daily 30 g 1     hydrOXYzine (VISTARIL) 25 MG capsule Take 1-2 capsules (25-50 mg) by mouth 3 times daily as needed for anxiety 90 capsule 1     LACTOBACILLUS PO        LORazepam (ATIVAN) 0.5 MG tablet Take 1 tablet (0.5 mg) by mouth 2 times daily as needed (Panic attack) 30 tablet 0     Magnesium Glycinate 100 MG CAPS Take 400 mg by mouth daily       mirtazapine (REMERON) 7.5 MG tablet Take 1 tablet (7.5 mg) by mouth at bedtime 30 tablet 4     MULTI-VITAMIN OR TABS Take 1 tablet by mouth daily  0     tamoxifen (NOLVADEX) 10 MG tablet Take 0.5 tablets (5 mg) by mouth daily 60 tablet 3     tretinoin (RETIN-A) 0.05 % external cream Apply topically At Bedtime       valACYclovir (VALTREX) 1000 mg tablet Take 2 tablets (2,000 mg) by mouth 2 times daily 4 tablet 11          Allergies   Allergen Reactions     No Known Allergies        Family History   Problem Relation Age of Onset     Cancer Mother 67        leukemia      C.A.D. Maternal Grandmother 80     Cerebrovascular Disease Maternal Grandmother      Neurologic Disorder Maternal Grandmother         parkinson's     Gastrointestinal Disease Maternal Grandmother      Osteoporosis  Maternal Grandmother      Eye Disorder Maternal Grandfather         cataracts     C.A.D. Paternal Grandmother 80     Hypertension Paternal Grandmother      Alzheimer Disease Paternal Grandmother      Musculoskeletal Disorder Paternal Grandmother      Arthritis Paternal Grandfather         Social history:  Living situation: , lives in [place]. 2 daughters and dog.   Occupation:   Tobacco use:   Tobacco Use      Smoking status: Never        Passive exposure: Never      Smokeless tobacco: Never  ETOH use: 0-2 drinks a week  Exercise: Getting over 150 min of exercise  Diet:     Physical exam  /70 (BP Location: Right arm, Patient Position: Sitting, Cuff Size: Adult Regular)   Pulse 87   Temp 98  F (36.7  C) (Oral)   Resp 18   Wt 61.7 kg (136 lb 1.6 oz)   LMP 10/24/2007   SpO2 98%   BMI 21.80 kg/m    Wt Readings from Last 4 Encounters:   08/20/24 63.5 kg (140 lb)   06/10/24 64 kg (141 lb 3.2 oz)   12/20/23 65.2 kg (143 lb 11.2 oz)   09/28/23 64.4 kg (142 lb)      General: No acute distress.    HEENT: Sclera anicteric.   Lymph: No lymphadenopathy in neck, supraclavicular, and axillary areas  Heart: RRR  Lungs: Clear to ascultation bilaterally  Breasts: Dense. No asymmetry. Nipples everted. No palpable masses.   Extremities: no lower extremity edema  Skin: No rash on exposed skin  Neuro: Cranial nerves grossly intact       IMPRESSION/PLAN:    History of left-sided stage 0 breast cancer, ER/CT positive, diagnosed in 2023  1 year out from diagnosis without evidence of recurrence, discussed risk of ER+ recurrence persists  Low risk for late effects  Plan for 3 years of low dose Tamoxifen per ROSENBERG-1  Annual mammogram and follow up with oncology every 6 months or as per her oncologist. She will be transferring care from Dr. Jones to Dr. Arizmendi. She requests to see me in 1 year if Dr. Arizmendi is ok with alternating visits.     Potential late effects related to surgery/radiation:  -Risk  of lymphedema: If she notices any swelling in her arm, she should be offered a referral to lymphedema physical therapy.   - Possible radiation side effects include cardiotoxicity, lung injury, fracture, and second malignancies. She should seek medical attention if she notices any new skin lesions in the area of radiation. If she should develop any shortness of breath, hemoptysis, cough, or new pain, I would advise further evaluation.    Dyspareunia  She is seeing GYN later today but I suspect if her symptoms are not BV then she could be having some GSM from stopping HRT and Tamoxifen. We had a risk benefit discussion about topical estrogen and I told her from an oncology standpoint we are ok with her trialing estrace or vagifem. We did discuss coconut oil but with her BV history I don't think this is a good option. Hyaluric acid products could be tried too but given she is low risk and is on Tamoxifen, vaginal estrogen is a reasonable option given her struggles over the last month or so.     Potential effects related to endocrine therapy:  -Risk of endometrial cancer. Risk is very low to negligible with low dose Tamoxifen. Routine gynecologic care. Monitor for abnormal uterine bleeding. She will be seeing GYN for endometrial US today.      -Risk of VTE.  Risk is very low to negligible with low dose Tamoxifen. Recommend VTE prophylaxis in setting of high risk VTE surgeries.Recommend diet and exercise to maintain a normal BMI and management of blood pressure. She should seek medical evaluation promptly if she develop signs and symptoms concerning for DVT/PE     General late effects screenings and recommendations    -Cancer screening. She should undergo routine screening for women in her age group. She will have a Pap and pelvic exam as directed by her primary care provider (or annually if on Tamoxifen).     -Healthy lifestyle. She should maintain a healthy weight with a BMI between 20-25. She should exercise at least  150 minutes weekly at moderate intensity. She should see the eye doctor every 1-2 years, and dentist every 6 months for cleanings. She should not use any tobacco. She should minimize alcohol intake. If continuing to drink, should follow CDC recommendations for no more than 1 alcoholic drink/day for women.    Non-urgent scheduling should be complete within 7-10 business days. However, if you have not received a phone call from scheduling within 3 business days, you may call to schedule at [phone] option 5, option 2. This is the same number to call to make changes tor if you have questions about the schedule.    Gave resources for our [facility] Cancer Survivorship class series and mailings list for educational opportunities. [email or web address]    Cancer treatment summary was sent electronically to the patient and her PCP.      The total time of this encounter amounted to 60 minutes.This time included time spent with the patient, prep work, ordering tests, creating a cancer treatment summary, and performing post visit documentation.    Ananya Amaral, PhD, MPAS, PAAUBRIE  [facility]     Again, thank you for allowing me to participate in the care of your patient.        Sincerely,        Ananya Amaral PA-C

## 2025-02-12 DIAGNOSIS — F41.9 ANXIETY: ICD-10-CM

## 2025-02-12 RX ORDER — MIRTAZAPINE 7.5 MG/1
7.5 TABLET, FILM COATED ORAL AT BEDTIME
Qty: 90 TABLET | Refills: 1 | Status: SHIPPED | OUTPATIENT
Start: 2025-02-12

## 2025-02-12 NOTE — TELEPHONE ENCOUNTER
Received Directly message from patient requesting refill of  mirtazapine .     Last office visit: 8/20/24  Scheduled for follow up 2/20/25     Will route request to MD/ for review.

## 2025-02-20 ENCOUNTER — VIRTUAL VISIT (OUTPATIENT)
Dept: ONCOLOGY | Facility: CLINIC | Age: 55
End: 2025-02-20
Attending: STUDENT IN AN ORGANIZED HEALTH CARE EDUCATION/TRAINING PROGRAM
Payer: COMMERCIAL

## 2025-02-20 VITALS — WEIGHT: 140 LBS | BODY MASS INDEX: 22.5 KG/M2 | HEIGHT: 66 IN

## 2025-02-20 DIAGNOSIS — D05.12 DUCTAL CARCINOMA IN SITU (DCIS) OF LEFT BREAST: Primary | ICD-10-CM

## 2025-02-20 DIAGNOSIS — N95.1 MENOPAUSAL SYNDROME (HOT FLASHES): ICD-10-CM

## 2025-02-20 DIAGNOSIS — Z51.5 ENCOUNTER FOR PALLIATIVE CARE: ICD-10-CM

## 2025-02-20 DIAGNOSIS — G47.9 DIFFICULTY SLEEPING: ICD-10-CM

## 2025-02-20 DIAGNOSIS — F41.9 ANXIETY: ICD-10-CM

## 2025-02-20 ASSESSMENT — PAIN SCALES - GENERAL: PAINLEVEL_OUTOF10: NO PAIN (0)

## 2025-02-20 NOTE — LETTER
"2/20/2025      Luna Eugene  [address]  [place] [address]      Dear Colleague,    Thank you for referring your patient, Luna uEgene, to the [facility] [place]. Please see a copy of my visit note below.    Palliative Care Progress Note    Patient Name: Luna Eugene  Primary Provider: Lisa Jack    Chief Complaint/Patient ID:   She has PMHx of DCIS, s/p partial mastectomy and localized RT. On tamoxifen.  -Worsening anxiety in setting of cancer dx/treatment.    Last Palliative care appointment: 8/20/24 with me.     [place]  Checked: [place]    Social History: .  2 daughters.  Has a dog.  (currently third grade).     Advanced Care Planning: Has not completed.  and daughters would be HCAs. Provided [place] [employer] [place] info 9/12/23.    Interim History:  Luna Eugene is seen today for follow up with Palliative Care via billable video visit.      Reviewed Oncology note from 12/10.  Plan is for 3 years of low-dose tamoxifen.  Discussed trial of topical estrogen for some sexual side effects.    Overall says that she is doing \"pretty good\".  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  They are not \"breaking their backs\", and they have mutual respect for each other and outside of work obligations.  They work on it when they can and turned it into somewhat of a social gathering.  She also says she has a wonderful class kids this year, and they will sometimes take breaks for self-regulation together.    Continues with her routine that she has established that helps her anxiety.  This includes journaling and praying.  She has been able to keep up with her daily walks, including on days when it has been incredibly cold.  Some days she will be inside the mall or inside the school.  Describes her " "walks as a \"game changers\".  Continues to meet with therapist at [facility] in [place].     There was a period of time in December and January where anxiety did spike.  There was something concerning that required her to have a D&C with her gynecologist.  Ultimately everything has come out fine, but there was an extended period of time before answers were found. Did not take hydroxyzine during that time.  Was able to use her mindfulness tools as well as deep breathing in 15 min increments when needed.    She continues to sleep well, which she also acknowledges is a big factor.    She and her  have been able to take several small trips to other around the country for concerts and such, which has been really therapeutic.  Recently got back from a 3-day trip to [place].    Her daughter got  in October which was also very happy celebration.      Hot flashes seems to disappear for a little while and now has come back in some capacity.  Not worse than they were before and not really affecting sleep.    Physical Exam:   Constitutional: Alert, pleasant, no apparent distress. Sitting up in chair.  Eyes: Sclera non-icteric, no eye discharge.  ENT: No nasal discharge. Ears grossly normal.  Respiratory: Unlabored respirations. Speaking in full sentences.  Musculoskeletal: Extremities appear normal- no gross deformities noted. No edema noted on upper body.   Skin: No suspicious lesions or rashes on visible skin.  Neurologic: Clear speech, no aphasia. No facial droop.  Psychiatric: Mentation appears normal, appropriate attention. Affect normal/bright. Does not appear anxious or depressed.    Key Data Reviewed:  IMAGING: Left Diagnostic Mammogram w/ russel 6/10/24- Findings:     Posttreatment changes of left breast conservation therapy. No suspicious abnormality in the left breast.                                                                   IMPRESSION: BI-RADS CATEGORY: 2 - Benign.      Impression & " Recommendations & Counseling:  Luna Eugene has a history of DCIS, s/p partial mastectomy and localized RT.      Anxiety  Sleep - Improved with Remeron.  Does still have periods of situational anxiety, but she is generally able to manage these with mindfulness and other self-regulatory techniques.  Her daily walks are a major component of her mental health.  We did again discuss that if anxiety becomes worse despite nonpharmacologic methods, or if she is having to spend increasing amounts of time trying to manage the anxiety, that would be an indicator to think about increasing Remeron.  -Continue following with mindfulness therapist.  -Continue Remeron 7.5 mg daily for now.  We did discuss the possibility of a dose increase in the future if needed.  Would still have sleep and appetite benefit up to 15 mg.  -If we increase the dose of Remeron, I would want a repeat an EKG about a month later.  -Continue hydroxyzine 25-50mg TID PRN-has not needed in several months.  -Continue with regular exercise and breathing focus.      Hot Flashes - Improved for a bit and then have recently come back.  She did previously have a prescription for gabapentin, however she never took it.    -If she feels that hot flashes are significant enough to warrant a trial of medication, would start gabapentin.  Would likely be okay to start with 300 mg, however she like to be more cautious, we could send 100 mg capsules.      Follow up: 6 months      Video-Visit Details  Video Start Time: 3:51PM  Video End Time: 4:17PM    Originating Location (pt. Location): Home     Distant Location (provider location):  Offsite- Personal Home      Platform used for Video Visit: YinYangMap     Total time spent on day of encounter is 40 mins, including reviewing record, review of above studies, above visit with patient, symptomatic discussion as above, including medication adjustments/prescription management, and documentation.       The longitudinal plan of  care for the diagnosis(es)/condition(s) as documented were addressed during this visit.?Due to the added complexity in care, I will continue to support Luna Eugene in the subsequent management and with the ongoing continuity of care.      Elizabeth Wood DO  Palliative Medicine   [id number]    Some chart documentation performed using Dragon Voice recognition Software. Although reviewed after completion, some words and grammatical errors may remain.      Again, thank you for allowing me to participate in the care of your patient.        Sincerely,        Elizabeth Wood DO    Electronically signed

## 2025-02-20 NOTE — NURSING NOTE
Current patient location:  [place], [place]    Is the patient currently in the state of [place]? YES    Visit mode: VIDEO    If the visit is dropped, the patient can be reconnected by:VIDEO VISIT: Text to cell phone:   Telephone Information:   Mobile [phone]       Will anyone else be joining the visit? NO  (If patient encounters technical issues they should call [phone])    Are changes needed to the allergy or medication list? Pt stated no changes to allergies and Pt stated no med changes    Are refills needed on medications prescribed by this physician? NO    Rooming Documentation:  Questionnaire(s) completed    Reason for visit: PAULA MOJICAF

## 2025-02-20 NOTE — PATIENT INSTRUCTIONS
Recommendations:  -If you feel anxiety becomes worse despite nonpharmacologic methods (your mindfulness techniques, journaling, playing, walking, breathing, etc.), or if you are having to spend increasing amounts of time trying to manage the anxiety, that would be an indicator to think about increasing Remeron.  -If we do increase the dose, we'll want to get an EKG about a month after the change.  -Keep an eye on the hot flashes as well and let me know if they become too bothersome.      Follow up: 6 months      *Please do not make any changes to medications that we prescribe, including pain medicines, without talking to our team*    Reasons to Call    If you are having worsening/uncontrolled symptoms we want you to call!    You or your other physicians make any changes to medications we have prescribed.  -Please call for refills 4-5 days before you will run out of medication.    Important Phone Numbers, including: Refills, scheduling, and general questions     Palliative Care RN: Faustina Faith : [phone]  *For scheduling needs/follow up visits : [phone]  *After hours or on weekends- Will connect you with on call MD : [phone].

## 2025-02-20 NOTE — LETTER
"2/20/2025      Luna Eugene  [address]  [place] [address]      Dear Colleague,    Thank you for referring your patient, Luna Eugene, to the [facility] [place]. Please see a copy of my visit note below.    Palliative Care Progress Note    Patient Name: Luna Eugene  Primary Provider: Lisa Jack    Chief Complaint/Patient ID:   She has PMHx of DCIS, s/p partial mastectomy and localized RT. On tamoxifen.  -Worsening anxiety in setting of cancer dx/treatment.    Last Palliative care appointment: 8/20/24 with me.     [place]    Social History: .  2 daughters.  Has a dog.  (currently third grade).     Advanced Care Planning: Has not completed.  and daughters would be HCAs. Provided [place] info 9/12/23.    Interim History:  Luna Eugene is seen today for follow up with Palliative Care via billable video visit.      Reviewed Oncology note from 12/10.  Plan is for 3 years of low-dose tamoxifen.  Discussed trial of topical estrogen for some sexual side effects.    Overall says that she is doing \"pretty good\".  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  They are not \"breaking their backs\", and they have mutual respect for each other and outside of work obligations.  They work on it when they can and turned it into somewhat of a social gathering.  She also says she has a wonderful class kids this year, and they will sometimes take breaks for self-regulation together.    Continues with her routine that she has established that helps her anxiety.  This includes journaling and praying.  She has been able to keep up with her daily walks, including on days when it has been incredibly cold.  Some days she will be inside the mall or inside the school.  Describes her " "walks as a \"game changers\".  Continues to meet with therapist at [facility] in [place].     There was a period of time in December and January where anxiety did spike.  There was something concerning that required her to have a D&C with her gynecologist.  Ultimately everything has come out fine, but there was an extended period of time before answers were found. Did not take hydroxyzine during that time.  Was able to use her mindfulness tools as well as deep breathing in 15 min increments when needed.    She continues to sleep well, which she also acknowledges is a big factor.    She and her  have been able to take several small trips to other around the country for concerts and such, which has been really therapeutic.  Recently got back from a 3-day trip to [place].    Her daughter got  in October which was also very happy celebration.      Hot flashes seems to disappear for a little while and now has come back in some capacity.  Not worse than they were before and not really affecting sleep.    Physical Exam:   Constitutional: Alert, pleasant, no apparent distress. Sitting up in chair.  Eyes: Sclera non-icteric, no eye discharge.  ENT: No nasal discharge. Ears grossly normal.  Respiratory: Unlabored respirations. Speaking in full sentences.  Musculoskeletal: Extremities appear normal- no gross deformities noted. No edema noted on upper body.   Skin: No suspicious lesions or rashes on visible skin.  Neurologic: Clear speech, no aphasia. No facial droop.  Psychiatric: Mentation appears normal, appropriate attention. Affect normal/bright. Does not appear anxious or depressed.    Key Data Reviewed:  IMAGING: Left Diagnostic Mammogram w/ russel 6/10/24- Findings:     Posttreatment changes of left breast conservation therapy. No suspicious abnormality in the left breast.                                                                   IMPRESSION: BI-RADS CATEGORY: 2 - Benign.      Impression & " Recommendations & Counseling:  Luna Eugene has a history of DCIS, s/p partial mastectomy and localized RT.      Anxiety  Sleep - Improved with Remeron.  Does still have periods of situational anxiety, but she is generally able to manage these with mindfulness and other self-regulatory techniques.  Her daily walks are a major component of her mental health.  We did again discuss that if anxiety becomes worse despite nonpharmacologic methods, or if she is having to spend increasing amounts of time trying to manage the anxiety, that would be an indicator to think about increasing Remeron.  -Continue following with mindfulness therapist.  -Continue Remeron 7.5 mg daily for now.  We did discuss the possibility of a dose increase in the future if needed.  Would still have sleep and appetite benefit up to 15 mg.  -If we increase the dose of Remeron, I would want a repeat an EKG about a month later.  -Continue hydroxyzine 25-50mg TID PRN-has not needed in several months.  -Continue with regular exercise and breathing focus.      Hot Flashes - Improved for a bit and then have recently come back.  She did previously have a prescription for gabapentin, however she never took it.    -If she feels that hot flashes are significant enough to warrant a trial of medication, would start gabapentin.  Would likely be okay to start with 300 mg, however she like to be more cautious, we could send 100 mg capsules.      Follow up: 6 months      Video-Visit Details  Video Start Time: 3:51PM  Video End Time: 4:17PM    Originating Location (pt. Location): Home     Distant Location (provider location):  Offsite- Personal Home      Platform used for Video Visit: [employer]     Total time spent on day of encounter is 40 mins, including reviewing record, review of above studies, above visit with patient, symptomatic discussion as above, including medication adjustments/prescription management, and documentation.       The longitudinal plan of  care for the diagnosis(es)/condition(s) as documented were addressed during this visit.?Due to the added complexity in care, I will continue to support Luna Eugene in the subsequent management and with the ongoing continuity of care.      Elizabeth Wood DO  Palliative Medicine   [id number]    Some chart documentation performed using Dragon Voice recognition Software. Although reviewed after completion, some words and grammatical errors may remain.      Again, thank you for allowing me to participate in the care of your patient.        Sincerely,        Elizabeth Wood DO    Electronically signed

## 2025-02-20 NOTE — PROGRESS NOTES
"Palliative Care Progress Note    Patient Name: Luna Eugene  Primary Provider: Lisa Jack    Chief Complaint/Patient ID:   She has PMHx of DCIS, s/p partial mastectomy and localized RT. On tamoxifen.  -Worsening anxiety in setting of cancer dx/treatment.    Last Palliative care appointment: 8/20/24 with me.     [place]    Social History: .  2 daughters.  Has a dog.  (currently third grade).     Advanced Care Planning: Has not completed.  and daughters would be HCAs. Provided [place] 9[place]12/23.    Interim History:  Luna Eugene is seen today for follow up with Palliative Care via billable video visit.      Reviewed Oncology note from 12/10.  Plan is for 3 years of low-dose tamoxifen.  Discussed trial of topical estrogen for some sexual side effects.    Overall says that she is doing \"pretty good\".  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  They are not \"breaking their backs\", and they have mutual respect for each other and outside of work obligations.  They work on it when they can and turned it into somewhat of a social gathering.  She also says she has a wonderful class kids this year, and they will sometimes take breaks for self-regulation together.    Continues with her routine that she has established that helps her anxiety.  This includes journaling and praying.  She has been able to keep up with her daily walks, including on days when it has been incredibly cold.  Some days she will be inside the mall or inside the school.  Describes her walks as a \"game changers\".  Contin[facility] to [facility] therapist [facility] in [place].     There was a period of time in December and January where anxiety did spike.  There was something concerning that required her to have a D&C with " her gynecologist.  Ultimately everything has come out fine, but there was an extended period of time before answers were found. Did not take hydroxyzine during that time.  Was able to use her mindfulness tools as well as deep breathing in 15 min increments when needed.    She continues to sleep well, which she also acknowledges is a big factor.    She and her  have been able to take several small trips to other around the country for concerts and such, which has been really therapeutic.  Recently got back from a 3-day trip to [place].    Her daughter got  in October which was also very happy celebration.      Hot flashes seems to disappear for a little while and now has come back in some capacity.  Not worse than they were before and not really affecting sleep.    Physical Exam:   Constitutional: Alert, pleasant, no apparent distress. Sitting up in chair.  Eyes: Sclera non-icteric, no eye discharge.  ENT: No nasal discharge. Ears grossly normal.  Respiratory: Unlabored respirations. Speaking in full sentences.  Musculoskeletal: Extremities appear normal- no gross deformities noted. No edema noted on upper body.   Skin: No suspicious lesions or rashes on visible skin.  Neurologic: Clear speech, no aphasia. No facial droop.  Psychiatric: Mentation appears normal, appropriate attention. Affect normal/bright. Does not appear anxious or depressed.    Key Data Reviewed:  IMAGING: Left Diagnostic Mammogram w/ russel 6/10/24- Findings:     Posttreatment changes of left breast conservation therapy. No suspicious abnormality in the left breast.                                                                   IMPRESSION: BI-RADS CATEGORY: 2 - Benign.      Impression & Recommendations & Counseling:  Luna Eugene has a history of DCIS, s/p partial mastectomy and localized RT.      Anxiety  Sleep - Improved with Remeron.  Does still have periods of situational anxiety, but she is generally able to manage these  with mindfulness and other self-regulatory techniques.  Her daily walks are a major component of her mental health.  We did again discuss that if anxiety becomes worse despite nonpharmacologic methods, or if she is having to spend increasing amounts of time trying to manage the anxiety, that would be an indicator to think about increasing Remeron.  -Continue following with mindfulness therapist.  -Continue Remeron 7.5 mg daily for now.  We did discuss the possibility of a dose increase in the future if needed.  Would still have sleep and appetite benefit up to 15 mg.  -If we increase the dose of Remeron, I would want a repeat an EKG about a month later.  -Continue hydroxyzine 25-50mg TID PRN-has not needed in several months.  -Continue with regular exercise and breathing focus.      Hot Flashes - Improved for a bit and then have recently come back.  She did previously have a prescription for gabapentin, however she never took it.    -If she feels that hot flashes are significant enough to warrant a trial of medication, would start gabapentin.  Would likely be okay to start with 300 mg, however she like to be more cautious, we could send 100 mg capsules.      Follow up: 6 months      Video-Visit Details  Video Start Time: 3:51PM  Video End Time: 4:17PM    Originating Location (pt. Location): Home     Distant Location (provider location):  Offsite- Personal Home      Platform used for Video Visit: [employer]     Total time spent on day of encounter is 40 mins, including reviewing record, review of above studies, above visit with patient, symptomatic discussion as above, including medication adjustments/prescription management, and documentation.       The longitudinal plan of care for the diagnosis(es)/condition(s) as documented were addressed during this visit.?Due to the added complexity in care, I will continue to support Luna Eugene in the subsequent management and with the ongoing continuity of  care.      Elizabeth Wood   Palliative Medicine   [id number]    Some chart documentation performed using Dragon Voice recognition Software. Although reviewed after completion, some words and grammatical errors may remain.

## 2025-06-10 NOTE — PROGRESS NOTES
Medical oncology visit    Luna Eugene    Age: 54 year old        CC: Breast Cancer      HPI: Luna Eugene is a 54 year old postmenopausal woman with 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  She underwent a partial mastectomy which demonstrated pTis disease.  She is s/p adjuvant radiation therapy.  Germline testing was negative.  She was started on 5 mg of tamoxifen in August 2024 and presents today for follow-up.      Interval History  Luna is accompanied by her  today and presents for follow-up.  She is generally feeling well. She takes Tamoxifen 5mg daily without any complaints. No bleeding, LE edema, vision changes. No new breast masses or lymph nodes. 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     She denies chest pain, SOB, abdominal pain.    Her full oncologic history is as follows:   Oncologic History  Patient Active Problem List    Diagnosis Date Noted    Ductal carcinoma in situ (DCIS) of left breast 05/16/2023     Priority: Medium     Left breast DCIS  4/12/2022 last screening mammogram normal    4/19/2023 screening mammogram possible calcifications in the left breast at 1:00, posterior depth.  Left breast diagnostic mammo showed punctate and fine pleomorphic calcifications in the left breast at 1:00, middle depth measuring up to 5 mm.     5/1/2023 stereotactic guided core biopsy showing grade 3 DCIS with comedonecrosis and calcifications.  ER(3+,> 95%), PA(2+, 70%)    5/8/2023     5/10/2023 Germline testing for BRCA1/BRCA2 pathogenic variant was not detected. NGS was negative for pathogenic variants of JOSE, BARD1, BRCA1, BRCA2, CDH1, CHEK2, NF1, PALB2, PTEN, STK11, TP53.     5/25/2023  RFID tag localized segmental mastectomy. Pathology showed 3mm of grade II DCIS. No invasive disease. Negative margins. pTis Nx Mx. Biomarkers not repeated     7/5/2023 -8/2/2023 received 4,240 cGy in 16 fractions to the left breast with an additional 1,000 cGy boost in 4 fractions      August 2023 start 5 mg of tamoxifen      Herpes simplex labialis 09/23/2013     Priority: Medium    Allergic Rhinitis      Priority: Medium    Irritable bowel syndrome      Priority: Medium    Fibromyalgia      Priority: Medium    Classical migraine      Priority: Medium    Panic disorder without agoraphobia      Priority: Medium    Insomnia      Priority: Medium          Current Medications:  Current Outpatient Medications   Medication Sig Dispense Refill    Cholecalciferol (VITAMIN D) 400 UNITS capsule Take 2 capsules by mouth daily.      Clascoterone (WINLEVI) 1 % CREA as needed.      hydrocortisone 2.5 % cream Apply topically 2 times daily (Patient taking differently: Apply topically as needed.) 30 g 1    hydrOXYzine (VISTARIL) 25 MG capsule Take 1-2 capsules (25-50 mg) by mouth 3 times daily as needed for anxiety 90 capsule 1    LACTOBACILLUS PO  (Patient not taking: Reported on 12/10/2024)      LORazepam (ATIVAN) 0.5 MG tablet Take 1 tablet (0.5 mg) by mouth 2 times daily as needed (Panic attack) 30 tablet 0    MAGNESIUM GLYCINATE PO Take 400 mg by mouth daily.      mirtazapine (REMERON) 7.5 MG tablet Take 1 tablet (7.5 mg) by mouth at bedtime. 90 tablet 1    MULTI-VITAMIN OR TABS Take 1 tablet by mouth daily  0    tamoxifen (NOLVADEX) 10 MG tablet Take 0.5 tablets (5 mg) by mouth daily 60 tablet 3    tretinoin (RETIN-A) 0.05 % external cream Apply topically as needed.      valACYclovir (VALTREX) 1000 mg tablet Take 2 tablets (2,000 mg) by mouth 2 times daily (Patient taking differently: Take 2,000 mg by mouth as needed.) 4 tablet 11         ECOG Performance Status: 0    Physical Examination:  [facility] 10/24/2007   General:  Well appearing, well-nourished adult female in NAD.  HEENT:  Normocephalic.  Sclera anicteric.  MMM.  No lesions of the oropharynx.  Breast: Status post left breast lumpectomy.  Well-healed.  No palpable lymph nodes.  Lymph:  No cervical, supraclavicular, or axillary LAD.  Chest:  CTA  bilaterally.  No wheezes or crackles.  CV:  RRR.  No murmurs or gallops  Abd:  Soft/NT/ND.  BS normoactive.  No hepatosplenomegaly.  Ext:  No pitting edema of the bilateral lower extremities.  Pulses 2+ and symmetric.  Musculo:  Strength 5/5 throughout.  Neuro:  Cranial nerves grossly intact.  Psych:  Mood and affect appear normal.      Laboratory Data:  Lab Results   Component Value Date    WBC 6.0 04/03/2008    HGB 12.5 04/03/2008    HCT 39.6 04/03/2008    MCV 92 04/03/2008     04/03/2008       Radiology data:  I have personally reviewed the images of / or the following radiology data: As detailed in the oncology timeline    Pathology and other data:  I have personally reviewed the following data: As detailed in the oncology timeline      Assessment and Recommendations  Luna Eugene  is a 54 year old postmenopausal woman with 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  She underwent a partial mastectomy, adjuvant radiation therapy, and has been on 5 mg of tamoxifen since August 2024.  She presents today for follow-up.    #Left breast DCIS  - Continue 5 mg of tamoxifen for a total of 3 years -until August 2027  - Annual mammograms -reviewed hers from today which is normal on my read. Will await radiology final    # Vaginal dryness  Discussed multiple options including trial of non-hormonal vaginal moisturizers (ie. Replens, HyaloGyn, Luvena, Revaree, vitamin E, coconut oil) for symptom management and vaginal lubricants (water/silicon/natural oil based) for intercourse  - If non-hormonal therapy fails to manage symptoms, 10-mcg vaginal estrogen tablet at the lowest frequency or the vaginal ring to alleviate symptoms can be considered    #Bone health  Recommend following up with her primary care physician regarding concerns of her bone density.  Tamoxifen can build bone density.  - Recommend continuing vitamin D3, adding in calcium, and continuing with weightbearing exercises    Plan:  -return to clinic in 6  months to see how she is doing on Tamoxifen  -mammogram in 1 year    Krissy Arizmendi MD  , Division of Hematology, Oncology and Transplantation

## 2025-06-11 ENCOUNTER — ONCOLOGY VISIT (OUTPATIENT)
Dept: ONCOLOGY | Facility: CLINIC | Age: 55
End: 2025-06-11
Attending: HOSPITALIST
Payer: COMMERCIAL

## 2025-06-11 ENCOUNTER — ANCILLARY PROCEDURE (OUTPATIENT)
Dept: MAMMOGRAPHY | Facility: CLINIC | Age: 55
End: 2025-06-11
Attending: HOSPITALIST
Payer: COMMERCIAL

## 2025-06-11 VITALS
BODY MASS INDEX: 22.05 KG/M2 | HEART RATE: 60 BPM | DIASTOLIC BLOOD PRESSURE: 78 MMHG | WEIGHT: 136.6 LBS | TEMPERATURE: 97.8 F | RESPIRATION RATE: 16 BRPM | SYSTOLIC BLOOD PRESSURE: 117 MMHG | OXYGEN SATURATION: 99 %

## 2025-06-11 DIAGNOSIS — Z12.31 VISIT FOR SCREENING MAMMOGRAM: Primary | ICD-10-CM

## 2025-06-11 DIAGNOSIS — Z12.31 VISIT FOR SCREENING MAMMOGRAM: ICD-10-CM

## 2025-06-11 LAB
HOLD SPECIMEN: NORMAL
HOLD SPECIMEN: NORMAL

## 2025-06-11 PROCEDURE — 36415 COLL VENOUS BLD VENIPUNCTURE: CPT | Performed by: HOSPITALIST

## 2025-06-11 PROCEDURE — 99214 OFFICE O/P EST MOD 30 MIN: CPT | Performed by: HOSPITALIST

## 2025-06-11 PROCEDURE — 77067 SCR MAMMO BI INCL CAD: CPT | Performed by: RADIOLOGY

## 2025-06-11 PROCEDURE — 77063 BREAST TOMOSYNTHESIS BI: CPT | Performed by: RADIOLOGY

## 2025-06-11 ASSESSMENT — PAIN SCALES - GENERAL: PAINLEVEL_OUTOF10: NO PAIN (0)

## 2025-06-11 NOTE — LETTER
6/11/2025      Luna Eugene  [address]  [place] [address]      Dear Colleague,    Thank you for referring your patient, Luna Eugene, to the [facility]. Please see a copy of my visit note below.    Medical oncology visit    Luna Eugene    Age: 54 year old        CC: Breast Cancer      HPI: Luna Eugene is a 54 year old postmenopausal woman with 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  She underwent a partial mastectomy which demonstrated pTis disease.  She is s/p adjuvant radiation therapy.  Germline testing was negative.  She was started on 5 mg of tamoxifen in August 2024 and presents today for follow-up.      Interval History  Luna is accompanied by her  today and presents for follow-up.  She is generally feeling well. She takes Tamoxifen 5mg daily without any complaints. No bleeding, LE edema, vision changes. No new breast masses or lymph nodes. 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     She denies chest pain, SOB, abdominal pain.    Her full oncologic history is as follows:   Oncologic History  Patient Active Problem List    Diagnosis Date Noted     Ductal carcinoma in situ (DCIS) of left breast 05/16/2023     Priority: Medium     Left breast DCIS  4/12/2022 last screening mammogram normal    4/19/2023 screening mammogram possible calcifications in the left breast at 1:00, posterior depth.  Left breast diagnostic mammo showed punctate and fine pleomorphic calcifications in the left breast at 1:00, middle depth measuring up to 5 mm.     5/1/2023 stereotactic guided core biopsy showing grade 3 DCIS with comedonecrosis and calcifications.  ER(3+,> 95%), NJ(2+, 70%)    5/8/2023     5/10/2023 Germline testing for BRCA1/BRCA2 pathogenic variant was not detected. NGS was negative for pathogenic variants of JOSE, BARD1, BRCA1, BRCA2, CDH1, CHEK2, NF1, PALB2, PTEN, STK11, TP53.     5/25/2023  RFID tag localized segmental mastectomy.  Pathology showed 3mm of grade II DCIS. No invasive disease. Negative margins. pTis Nx Mx. Biomarkers not repeated     7/5/2023 -8/2/2023 received 4,240 cGy in 16 fractions to the left breast with an additional 1,000 cGy boost in 4 fractions     August 2023 start 5 mg of tamoxifen       Herpes simplex labialis 09/23/2013     Priority: Medium     Allergic Rhinitis      Priority: Medium     Irritable bowel syndrome      Priority: Medium     Fibromyalgia      Priority: Medium     Classical migraine      Priority: Medium     Panic disorder without agoraphobia      Priority: Medium     Insomnia      Priority: Medium          Current Medications:  Current Outpatient Medications   Medication Sig Dispense Refill     Cholecalciferol (VITAMIN D) 400 UNITS capsule Take 2 capsules by mouth daily.       Clascoterone (WINLEVI) 1 % CREA as needed.       hydrocortisone 2.5 % cream Apply topically 2 times daily (Patient taking differently: Apply topically as needed.) 30 g 1     hydrOXYzine (VISTARIL) 25 MG capsule Take 1-2 capsules (25-50 mg) by mouth 3 times daily as needed for anxiety 90 capsule 1     LACTOBACILLUS PO  (Patient not taking: Reported on 12/10/2024)       LORazepam (ATIVAN) 0.5 MG tablet Take 1 tablet (0.5 mg) by mouth 2 times daily as needed (Panic attack) 30 tablet 0     MAGNESIUM GLYCINATE PO Take 400 mg by mouth daily.       mirtazapine (REMERON) 7.5 MG tablet Take 1 tablet (7.5 mg) by mouth at bedtime. 90 tablet 1     MULTI-VITAMIN OR TABS Take 1 tablet by mouth daily  0     tamoxifen (NOLVADEX) 10 MG tablet Take 0.5 tablets (5 mg) by mouth daily 60 tablet 3     tretinoin (RETIN-A) 0.05 % external cream Apply topically as needed.       valACYclovir (VALTREX) 1000 mg tablet Take 2 tablets (2,000 mg) by mouth 2 times daily (Patient taking differently: Take 2,000 mg by mouth as needed.) 4 tablet 11         ECOG Performance Status: 0    Physical Examination:  [facility] 10/24/2007   General:  Well appearing, well-nourished  adult female in NAD.  HEENT:  Normocephalic.  Sclera anicteric.  MMM.  No lesions of the oropharynx.  Breast: Status post left breast lumpectomy.  Well-healed.  No palpable lymph nodes.  Lymph:  No cervical, supraclavicular, or axillary LAD.  Chest:  CTA bilaterally.  No wheezes or crackles.  CV:  RRR.  No murmurs or gallops  Abd:  Soft/NT/ND.  BS normoactive.  No hepatosplenomegaly.  Ext:  No pitting edema of the bilateral lower extremities.  Pulses 2+ and symmetric.  Musculo:  Strength 5/5 throughout.  Neuro:  Cranial nerves grossly intact.  Psych:  Mood and affect appear normal.      Laboratory Data:  Lab Results   Component Value Date    WBC 6.0 04/03/2008    HGB 12.5 04/03/2008    HCT 39.6 04/03/2008    MCV 92 04/03/2008     04/03/2008       Radiology data:  I have personally reviewed the images of / or the following radiology data: As detailed in the oncology timeline    Pathology and other data:  I have personally reviewed the following data: As detailed in the oncology timeline      Assessment and Recommendations  Luna Eugene  is a 54 year old postmenopausal woman with 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  She underwent a partial mastectomy, adjuvant radiation therapy, and has been on 5 mg of tamoxifen since August 2024.  She presents today for follow-up.    #Left breast DCIS  - Continue 5 mg of tamoxifen for a total of 3 years -until August 2027  - Annual mammograms -reviewed hers from today which is normal on my read. Will await radiology final    # Vaginal dryness  Discussed multiple options including trial of non-hormonal vaginal moisturizers (ie. Replens, HyaloGyn, Luvena, Revaree, vitamin E, coconut oil) for symptom management and vaginal lubricants (water/silicon/natural oil based) for intercourse  - If non-hormonal therapy fails to manage symptoms, 10-mcg vaginal estrogen tablet at the lowest frequency or the vaginal ring to alleviate symptoms can be considered    #Bone  health  Recommend following up with her primary care physician regarding concerns of her bone density.  Tamoxifen can build bone density.  - Recommend continuing vitamin D3, adding in calcium, and continuing with weightbearing exercises    Plan:  -return to clinic in 6 months to see how she is doing on Tamoxifen  -mammogram in 1 year    Krissy Arizmendi MD  , Division of Hematology, Oncology and Transplantation             Again, thank you for allowing me to participate in the care of your patient.        Sincerely,        Krissy Arizmendi MD    Electronically signed

## 2025-06-11 NOTE — NURSING NOTE
Chief Complaint   Patient presents with    Blood Draw     Blood draw via  by RN       Labs collected from venipuncture by RN. Vitals taken. Checked in for appointment(s).     Luz Marina Fair RN

## 2025-06-11 NOTE — NURSING NOTE
"Oncology Rooming Note    June 11, 2025 10:58 AM   Luna Eugene is a 54 year old female who presents for:    Chief Complaint   Patient presents with    Blood Draw     Blood draw via  by RN    Oncology Clinic Visit     Visit for screening mammogram     Initial Vitals: /78   Pulse 60   Temp 97.8  F (36.6  C)   Resp 16   Wt 62 kg (136 lb 9.6 oz)   LMP 10/24/2007   SpO2 99%   BMI 22.05 kg/m   Estimated body mass index is 22.05 kg/m  as calculated from the following:    Height as of 2/20/25: 1.676 m (5' 6\").    Weight as of this encounter: 62 kg (136 lb 9.6 oz). Body surface area is 1.7 meters squared.  No Pain (0) Comment: Data Unavailable   Patient's last menstrual period was 10/24/2007.  Allergies reviewed: Yes  Medications reviewed: Yes    Medications: MEDICATION REFILLS NEEDED TODAY. Provider was notified.  Pharmacy name entered into EPIC:    [id number] - [place], [place] - [address]  [id number] - [place], [place] - [address] [id number] - [place], [place] - [address]  [facility] [place], [place] - [phone]    Frailty Screening:   Is the patient here for a new oncology consult visit in cancer care? 2. No    PHQ9:  Did this patient require a PHQ9?: No      Clinical concerns: needs tamoxifen refilled       Paul Silva              "

## 2025-07-16 ENCOUNTER — MYC REFILL (OUTPATIENT)
Dept: ONCOLOGY | Facility: CLINIC | Age: 55
End: 2025-07-16
Payer: COMMERCIAL

## 2025-07-16 DIAGNOSIS — D05.12 DUCTAL CARCINOMA IN SITU (DCIS) OF LEFT BREAST: ICD-10-CM

## 2025-07-16 RX ORDER — TAMOXIFEN CITRATE 10 MG/1
5 TABLET ORAL DAILY
Qty: 60 TABLET | Refills: 3 | Status: SHIPPED | OUTPATIENT
Start: 2025-07-16

## 2025-07-16 NOTE — CONFIDENTIAL NOTE
Tamoxifen Refill   Last prescribing provider: DR Jones     Last clinic visit date: 6/11/25 Dr Arizmendi     Recommendations for requested medication (if none, N/A): Copied from chart note   #Left breast DCIS  - Continue 5 mg of tamoxifen for a total of 3 years -until August 2027  - Annual mammograms -reviewed hers from today which is normal on my read. Will await radiology final     Any other pertinent information (if none, N/A): N/A    Refilled: Y/N, if NO, why?

## 2025-07-30 ENCOUNTER — MYC REFILL (OUTPATIENT)
Dept: PALLIATIVE CARE | Facility: CLINIC | Age: 55
End: 2025-07-30
Payer: COMMERCIAL

## 2025-07-30 DIAGNOSIS — F41.9 ANXIETY: ICD-10-CM

## 2025-07-30 RX ORDER — MIRTAZAPINE 7.5 MG/1
7.5 TABLET, FILM COATED ORAL AT BEDTIME
Qty: 90 TABLET | Refills: 1 | Status: SHIPPED | OUTPATIENT
Start: 2025-07-30

## 2025-07-30 NOTE — TELEPHONE ENCOUNTER
Received F[employer]ib[employer] message from patient requesting refill of mirtazapine.     Last office visit: 2/20/25  Scheduled for follow up 8/20/25     Will route request to MD/ for review.

## 2025-08-20 ENCOUNTER — VIRTUAL VISIT (OUTPATIENT)
Dept: ONCOLOGY | Facility: CLINIC | Age: 55
End: 2025-08-20
Attending: STUDENT IN AN ORGANIZED HEALTH CARE EDUCATION/TRAINING PROGRAM
Payer: COMMERCIAL

## 2025-08-20 VITALS — BODY MASS INDEX: 22.6 KG/M2 | WEIGHT: 140 LBS

## 2025-08-20 DIAGNOSIS — N95.1 MENOPAUSAL SYNDROME (HOT FLASHES): ICD-10-CM

## 2025-08-20 DIAGNOSIS — F41.9 ANXIETY: ICD-10-CM

## 2025-08-20 DIAGNOSIS — D05.12 DUCTAL CARCINOMA IN SITU (DCIS) OF LEFT BREAST: Primary | ICD-10-CM

## 2025-08-20 DIAGNOSIS — Z51.5 ENCOUNTER FOR PALLIATIVE CARE: ICD-10-CM

## 2025-08-20 DIAGNOSIS — G47.9 DIFFICULTY SLEEPING: ICD-10-CM

## 2025-08-20 ASSESSMENT — PAIN SCALES - GENERAL: PAINLEVEL_OUTOF10: NO PAIN (0)

## (undated) DEVICE — SU VICRYL 3-0 SH 27" UND J416H

## (undated) DEVICE — SET BREAST BIOPSY LOCALIZER 20 PROBE 8MM PENCIL 09-0006

## (undated) DEVICE — ESU ELEC BLADE 2.75" COATED/INSULATED E1455

## (undated) DEVICE — PREP CHLORAPREP 26ML TINTED ORANGE  260815

## (undated) DEVICE — SURGICEL ABSORBABLE HEMOSTAT SNOW 4"X4" 2083

## (undated) DEVICE — ESU GROUND PAD ADULT W/CORD E7507

## (undated) DEVICE — SU MONOCRYL 4-0 P-3 18" UND Y494G

## (undated) DEVICE — DRSG STERI STRIP 1/2X4" R1547

## (undated) DEVICE — SOL NACL 0.9% IRRIG 500ML BOTTLE 2F7123

## (undated) DEVICE — NDL 30GA 0.5" 305106

## (undated) DEVICE — GLOVE BIOGEL PI MICRO INDICATOR UNDERGLOVE SZ 6.0 48960

## (undated) DEVICE — GLOVE BIOGEL PI MICRO SZ 6.0 48560

## (undated) DEVICE — CLIP HORIZON MED BLUE 002200

## (undated) DEVICE — NDL 15GA 1.5" 8881200029

## (undated) DEVICE — DRSG PRIMAPORE 03 1/8X6" 66000318

## (undated) DEVICE — DRAPE U SPLIT 74X120" 29440

## (undated) DEVICE — SOL WATER IRRIG 500ML BOTTLE 2F7113

## (undated) DEVICE — PACK MINOR CUSTOM ASC

## (undated) DEVICE — CLIP HORIZON SM RED WIDE SLOT 001201

## (undated) DEVICE — STRAP KNEE/BODY 31143004

## (undated) DEVICE — DRAPE IOBAN INCISE 23X17" 6650EZ

## (undated) DEVICE — SYR 05ML LL W/O NDL

## (undated) DEVICE — LINEN TOWEL PACK X5 5464

## (undated) DEVICE — ESU PENCIL SMOKE EVAC W/ROCKER SWITCH 0703-047-000

## (undated) RX ORDER — CEFAZOLIN SODIUM 2 G/50ML
SOLUTION INTRAVENOUS
Status: DISPENSED
Start: 2023-05-25

## (undated) RX ORDER — FENTANYL CITRATE 50 UG/ML
INJECTION, SOLUTION INTRAMUSCULAR; INTRAVENOUS
Status: DISPENSED
Start: 2023-05-25

## (undated) RX ORDER — ACETAMINOPHEN 325 MG/1
TABLET ORAL
Status: DISPENSED
Start: 2023-05-25

## (undated) RX ORDER — PROPOFOL 10 MG/ML
INJECTION, EMULSION INTRAVENOUS
Status: DISPENSED
Start: 2023-05-25

## (undated) RX ORDER — ONDANSETRON 2 MG/ML
INJECTION INTRAMUSCULAR; INTRAVENOUS
Status: DISPENSED
Start: 2023-05-25

## (undated) RX ORDER — KETOROLAC TROMETHAMINE 30 MG/ML
INJECTION, SOLUTION INTRAMUSCULAR; INTRAVENOUS
Status: DISPENSED
Start: 2023-05-25